# Patient Record
Sex: FEMALE | Race: WHITE | NOT HISPANIC OR LATINO
[De-identification: names, ages, dates, MRNs, and addresses within clinical notes are randomized per-mention and may not be internally consistent; named-entity substitution may affect disease eponyms.]

---

## 2017-04-12 ENCOUNTER — HOSPITAL (OUTPATIENT)
Dept: OTHER | Age: 27
End: 2017-04-12
Attending: EMERGENCY MEDICINE

## 2017-04-12 LAB
ALBUMIN SERPL-MCNC: 3.3 GM/DL (ref 3.6–5.1)
ALP SERPL-CCNC: 58 UNIT/L (ref 45–117)
ALT SERPL-CCNC: 33 UNIT/L
AMORPH SED URNS QL MICRO: ABNORMAL
ANALYZER ANC (IANC): ABNORMAL
ANION GAP SERPL CALC-SCNC: 15 MMOL/L (ref 10–20)
APPEARANCE UR: CLEAR
AST SERPL-CCNC: 26 UNIT/L
BACTERIA #/AREA URNS HPF: ABNORMAL /HPF
BASOPHILS # BLD: 0 THOUSAND/MCL (ref 0–0.3)
BASOPHILS NFR BLD: 0 %
BILIRUB CONJ SERPL-MCNC: 0.1 MG/DL (ref 0–0.2)
BILIRUB SERPL-MCNC: 0.4 MG/DL (ref 0.2–1)
BILIRUB UR QL: NEGATIVE
BUN SERPL-MCNC: 9 MG/DL (ref 6–20)
BUN/CREAT SERPL: 11 (ref 7–25)
CALCIUM SERPL-MCNC: 8.6 MG/DL (ref 8.4–10.2)
CAOX CRY URNS QL MICRO: ABNORMAL
CHLORIDE: 104 MMOL/L (ref 98–107)
CO2 SERPL-SCNC: 23 MMOL/L (ref 21–32)
COLOR UR: ABNORMAL
CREAT SERPL-MCNC: 0.8 MG/DL (ref 0.51–0.95)
DIFFERENTIAL METHOD BLD: ABNORMAL
EOSINOPHIL # BLD: 0 THOUSAND/MCL (ref 0.1–0.5)
EOSINOPHIL NFR BLD: 1 %
EPITH CASTS #/AREA URNS LPF: ABNORMAL /[LPF]
ERYTHROCYTE [DISTWIDTH] IN BLOOD: 12.2 % (ref 11–15)
FATTY CASTS #/AREA URNS LPF: ABNORMAL /[LPF]
GLUCOSE SERPL-MCNC: 88 MG/DL (ref 65–99)
GLUCOSE UR-MCNC: NEGATIVE MG/DL
GRAN CASTS #/AREA URNS LPF: ABNORMAL /[LPF]
HCG SERPL QL: NEGATIVE
HEMATOCRIT: 37.1 % (ref 36–46.5)
HGB BLD-MCNC: 12.6 GM/DL (ref 12–15.5)
HGB UR QL: NEGATIVE
HYALINE CASTS #/AREA URNS LPF: ABNORMAL /LPF (ref 0–5)
KETONES UR-MCNC: NEGATIVE MG/DL
LEUKOCYTE ESTERASE UR QL STRIP: NEGATIVE
LIPASE SERPL-CCNC: 85 UNIT/L (ref 73–393)
LYMPHOCYTES # BLD: 1.6 THOUSAND/MCL (ref 1–4.8)
LYMPHOCYTES NFR BLD: 19 %
MCH RBC QN AUTO: 29.2 PG (ref 26–34)
MCHC RBC AUTO-ENTMCNC: 34 GM/DL (ref 32–36.5)
MCV RBC AUTO: 85.9 FL (ref 78–100)
MIXED CELL CASTS #/AREA URNS LPF: ABNORMAL /[LPF]
MONOCYTES # BLD: 0.8 THOUSAND/MCL (ref 0.3–0.9)
MONOCYTES NFR BLD: 9 %
MUCOUS THREADS URNS QL MICRO: PRESENT
NEUTROPHILS # BLD: 6 THOUSAND/MCL (ref 1.8–7.7)
NEUTROPHILS NFR BLD: 71 %
NEUTS SEG NFR BLD: ABNORMAL %
NITRITE UR QL: NEGATIVE
PERCENT NRBC: ABNORMAL
PH UR: 5 UNIT (ref 5–7)
PLATELET # BLD: 212 THOUSAND/MCL (ref 140–450)
POTASSIUM SERPL-SCNC: 4 MMOL/L (ref 3.4–5.1)
PROT SERPL-MCNC: 7.2 GM/DL (ref 6.4–8.2)
PROT UR QL: NEGATIVE MG/DL
RBC # BLD: 4.32 MILLION/MCL (ref 4–5.2)
RBC #/AREA URNS HPF: ABNORMAL /HPF (ref 0–3)
RBC CASTS #/AREA URNS LPF: ABNORMAL /[LPF]
RENAL EPI CELLS #/AREA URNS HPF: ABNORMAL /[HPF]
SODIUM SERPL-SCNC: 138 MMOL/L (ref 135–145)
SP GR UR: 1.02 (ref 1–1.03)
SPECIMEN SOURCE: ABNORMAL
SPERM URNS QL MICRO: ABNORMAL
SQUAMOUS #/AREA URNS HPF: ABNORMAL /HPF (ref 0–5)
T VAGINALIS URNS QL MICRO: ABNORMAL
TRI-PHOS CRY URNS QL MICRO: ABNORMAL
URATE CRY URNS QL MICRO: ABNORMAL
URINE REFLEX: ABNORMAL
URNS CMNT MICRO: ABNORMAL
UROBILINOGEN UR QL: 0.2 MG/DL (ref 0–1)
WAXY CASTS #/AREA URNS LPF: ABNORMAL /[LPF]
WBC # BLD: 8.5 THOUSAND/MCL (ref 4.2–11)
WBC #/AREA URNS HPF: ABNORMAL /HPF (ref 0–5)
WBC CASTS #/AREA URNS LPF: ABNORMAL /[LPF]
YEAST HYPHAE URNS QL MICRO: ABNORMAL
YEAST URNS QL MICRO: ABNORMAL

## 2017-04-18 ENCOUNTER — HOSPITAL (OUTPATIENT)
Dept: OTHER | Age: 27
End: 2017-04-18
Attending: EMERGENCY MEDICINE

## 2017-04-18 LAB
ALBUMIN SERPL-MCNC: 3.2 GM/DL (ref 3.6–5.1)
ALBUMIN/GLOB SERPL: 0.8 {RATIO} (ref 1–2.4)
ALP SERPL-CCNC: 56 UNIT/L (ref 45–117)
ALT SERPL-CCNC: 24 UNIT/L
AMORPH SED URNS QL MICRO: ABNORMAL
ANALYZER ANC (IANC): ABNORMAL
ANION GAP SERPL CALC-SCNC: 13 MMOL/L (ref 10–20)
APPEARANCE UR: ABNORMAL
AST SERPL-CCNC: 20 UNIT/L
BACTERIA #/AREA URNS HPF: ABNORMAL /HPF
BASOPHILS # BLD: 0 THOUSAND/MCL (ref 0–0.3)
BASOPHILS NFR BLD: 0 %
BILIRUB SERPL-MCNC: 0.3 MG/DL (ref 0.2–1)
BILIRUB UR QL: NEGATIVE
BUN SERPL-MCNC: 9 MG/DL (ref 6–20)
BUN/CREAT SERPL: 13 (ref 7–25)
CALCIUM SERPL-MCNC: 8.6 MG/DL (ref 8.4–10.2)
CAOX CRY URNS QL MICRO: ABNORMAL
CHLORIDE: 105 MMOL/L (ref 98–107)
CO2 SERPL-SCNC: 25 MMOL/L (ref 21–32)
COLOR UR: ABNORMAL
CREAT SERPL-MCNC: 0.67 MG/DL (ref 0.51–0.95)
DIFFERENTIAL METHOD BLD: ABNORMAL
EOSINOPHIL # BLD: 0.1 THOUSAND/MCL (ref 0.1–0.5)
EOSINOPHIL NFR BLD: 1 %
EPITH CASTS #/AREA URNS LPF: ABNORMAL /[LPF]
ERYTHROCYTE [DISTWIDTH] IN BLOOD: 12 % (ref 11–15)
FATTY CASTS #/AREA URNS LPF: ABNORMAL /[LPF]
GLOBULIN SER-MCNC: 4 GM/DL (ref 2–4)
GLUCOSE SERPL-MCNC: 100 MG/DL (ref 65–99)
GLUCOSE UR-MCNC: NEGATIVE MG/DL
GRAN CASTS #/AREA URNS LPF: ABNORMAL /[LPF]
HEMATOCRIT: 36.9 % (ref 36–46.5)
HGB BLD-MCNC: 12.6 GM/DL (ref 12–15.5)
HGB UR QL: ABNORMAL
HYALINE CASTS #/AREA URNS LPF: ABNORMAL /LPF (ref 0–5)
KETONES UR-MCNC: NEGATIVE MG/DL
LEUKOCYTE ESTERASE UR QL STRIP: ABNORMAL
LYMPHOCYTES # BLD: 3.3 THOUSAND/MCL (ref 1–4.8)
LYMPHOCYTES NFR BLD: 19 %
MCH RBC QN AUTO: 29.6 PG (ref 26–34)
MCHC RBC AUTO-ENTMCNC: 34.1 GM/DL (ref 32–36.5)
MCV RBC AUTO: 86.6 FL (ref 78–100)
MIXED CELL CASTS #/AREA URNS LPF: ABNORMAL /[LPF]
MONOCYTES # BLD: 0.8 THOUSAND/MCL (ref 0.3–0.9)
MONOCYTES NFR BLD: 5 %
MUCOUS THREADS URNS QL MICRO: PRESENT
NEUTROPHILS # BLD: 12.9 THOUSAND/MCL (ref 1.8–7.7)
NEUTROPHILS NFR BLD: 75 %
NEUTS SEG NFR BLD: ABNORMAL %
NITRITE UR QL: NEGATIVE
PERCENT NRBC: ABNORMAL
PH UR: 6 UNIT (ref 5–7)
PLATELET # BLD: 316 THOUSAND/MCL (ref 140–450)
POTASSIUM SERPL-SCNC: 3.9 MMOL/L (ref 3.4–5.1)
PROT SERPL-MCNC: 7.2 GM/DL (ref 6.4–8.2)
PROT UR QL: 100 MG/DL
RBC # BLD: 4.26 MILLION/MCL (ref 4–5.2)
RBC #/AREA URNS HPF: >100 /HPF (ref 0–3)
RBC CASTS #/AREA URNS LPF: ABNORMAL /[LPF]
RENAL EPI CELLS #/AREA URNS HPF: ABNORMAL /[HPF]
SODIUM SERPL-SCNC: 139 MMOL/L (ref 135–145)
SP GR UR: 1.02 (ref 1–1.03)
SPECIMEN SOURCE: ABNORMAL
SPERM URNS QL MICRO: ABNORMAL
SQUAMOUS #/AREA URNS HPF: ABNORMAL /HPF (ref 0–5)
T VAGINALIS URNS QL MICRO: ABNORMAL
TRI-PHOS CRY URNS QL MICRO: ABNORMAL
URATE CRY URNS QL MICRO: ABNORMAL
URINE REFLEX: ABNORMAL
URNS CMNT MICRO: ABNORMAL
UROBILINOGEN UR QL: 0.2 MG/DL (ref 0–1)
WAXY CASTS #/AREA URNS LPF: ABNORMAL /[LPF]
WBC # BLD: 17.2 THOUSAND/MCL (ref 4.2–11)
WBC #/AREA URNS HPF: >100 /HPF (ref 0–5)
WBC CASTS #/AREA URNS LPF: ABNORMAL /[LPF]
YEAST HYPHAE URNS QL MICRO: ABNORMAL
YEAST URNS QL MICRO: ABNORMAL

## 2017-05-12 PROBLEM — R39.15 URGENCY OF URINATION: Status: ACTIVE | Noted: 2017-05-12

## 2017-05-12 PROBLEM — R82.90 ABNORMAL URINALYSIS: Status: ACTIVE | Noted: 2017-05-12

## 2017-05-12 PROBLEM — R10.2 PELVIC PAIN: Status: ACTIVE | Noted: 2017-05-12

## 2017-05-16 PROBLEM — N30.10 IC (INTERSTITIAL CYSTITIS): Status: ACTIVE | Noted: 2017-05-16

## 2017-06-07 NOTE — OPERATIVE REPORT
Willow Tomas  6/30/1990  [id number]  06/07/2017      Operative Report  Location:  [facility]      Preoperative diagnosis: Interstitial cystitis, Hunner's ulcer  Postoperative diagnosis: Same  Procedure: Cystoscopy with low pressure hydro-distention, f

## 2017-06-07 NOTE — H&P
Progress Notes    Jake Izaguirre (MR# [id number])         Progress Notes by Raissa Santos MD at 5/12/2017  8:39 AM      Author: Raissa Santos MD Service: (none) Author Type: Physician     Filed: 5/12/2017 12:23 PM Note Time: 5/12/2017  8:39 AM Status: Prescriptions: Tolterodine Tartrate ER 4 MG Oral Capsule SR 24 Hr  Take 1 capsule (4 mg total) by mouth daily.  Disp: 30 capsule  Rfl: 2    Methen-Hyosc-Meth Blue-Na Phos (UROGESIC-BLUE) 81.6 MG Oral Tab  Take 81.6 mg by mouth daily.  Claims: Simple Save R Andrew Fariba Date    •  Extrinsic asthma, unspecified          sports induced    •  Other and unspecified ovarian cysts      •  Irritable bowel      •  Hematuria          weird presentation of ibs    •  Asthma      •  GERD (gastroesophageal reflux bleeding  ENDOCRINE:  No sweating, no heat or cold intolerance    PSYCH: No nervousness,  depression,  stress         EXAM:    Ht 5' 7\" (1.702 m)  Wt 141 lb (63.957 kg)  BMI 22.08 kg/m2  LMP 04/27/2017  GENERAL: well developed, well nourished,in no appare continue  - pyridium PRN- not helpful  - trial of Urogesic Blue    - avoid ALL bladder irritants    - Return to clinic in 1-2 weeks for cystoscopy         The above referenced H&P was reviewed by Darline Bright MD on 06/07/2017, the patient was examined a

## 2017-06-07 NOTE — ANESTHESIA PREPROCEDURE EVALUATION
PRE-OP EVALUATION    Patient Name: Hector Grayson    Pre-op Diagnosis: INTERSTITIAL CYSTITIS    Procedure(s):  CYSTOSCOPY, FULGURATION OF HUNNER'S ULCER, KENALOG INJECTION    Surgeon(s) and Role:     * Niurka Marcial MD - Primary    Pre-op vitals reviewed into the lungs every 4 (four) hours as needed for Wheezing. Disp: 1 Inhaler Rfl: 5   Desogestrel-Ethinyl Estradiol (RECLIPSEN) 0.15-30 MG-MCG Oral Tab Take 1 tablet by mouth daily. Disp:  Rfl:        Allergies: Amitriptyline;  Augmentin      Anesthesia Eval Airway      Mallampati: I  Mouth opening: >3 FB  TM distance: 4 - 6 cm   Cardiovascular    Cardiovascular exam normal.         Dental    No notable dental history.          Pulmonary    Pulmonary exam normal.                 Other findings

## 2017-06-07 NOTE — ANESTHESIA POSTPROCEDURE EVALUATION
[facility] Patient Status:  Hospital Outpatient Surgery   Age/Gender 27year old female MRN [id number]   [facility] SURGERY Attending Nancy Corrigan MD   Hosp Day # 0 PCP Delma Acuna MD       Anesthesia Post-op Note

## 2017-08-15 ENCOUNTER — HOSPITAL (OUTPATIENT)
Dept: OTHER | Age: 27
End: 2017-08-15
Attending: EMERGENCY MEDICINE

## 2017-08-15 ENCOUNTER — DIAGNOSTIC TRANS (OUTPATIENT)
Dept: OTHER | Age: 27
End: 2017-08-15

## 2017-08-15 LAB
ANALYZER ANC (IANC): ABNORMAL
ANION GAP SERPL CALC-SCNC: 13 MMOL/L (ref 10–20)
BASOPHILS # BLD: 0 THOUSAND/MCL (ref 0–0.3)
BASOPHILS NFR BLD: 0 %
BUN SERPL-MCNC: 13 MG/DL (ref 6–20)
BUN/CREAT SERPL: 17 (ref 7–25)
CALCIUM SERPL-MCNC: 8.7 MG/DL (ref 8.4–10.2)
CHLORIDE: 106 MMOL/L (ref 98–107)
CO2 SERPL-SCNC: 25 MMOL/L (ref 21–32)
CREAT SERPL-MCNC: 0.78 MG/DL (ref 0.51–0.95)
DIFFERENTIAL METHOD BLD: ABNORMAL
EOSINOPHIL # BLD: 0.1 THOUSAND/MCL (ref 0.1–0.5)
EOSINOPHIL NFR BLD: 1 %
ERYTHROCYTE [DISTWIDTH] IN BLOOD: 12.6 % (ref 11–15)
GLUCOSE SERPL-MCNC: 90 MG/DL (ref 65–99)
HEMATOCRIT: 35.2 % (ref 36–46.5)
HGB BLD-MCNC: 11.6 GM/DL (ref 12–15.5)
LYMPHOCYTES # BLD: 1.6 THOUSAND/MCL (ref 1–4.8)
LYMPHOCYTES NFR BLD: 27 %
MCH RBC QN AUTO: 29.3 PG (ref 26–34)
MCHC RBC AUTO-ENTMCNC: 33 GM/DL (ref 32–36.5)
MCV RBC AUTO: 88.9 FL (ref 78–100)
MONOCYTES # BLD: 0.4 THOUSAND/MCL (ref 0.3–0.9)
MONOCYTES NFR BLD: 6 %
NEUTROPHILS # BLD: 4 THOUSAND/MCL (ref 1.8–7.7)
NEUTROPHILS NFR BLD: 66 %
NEUTS SEG NFR BLD: ABNORMAL %
PERCENT NRBC: ABNORMAL
PLATELET # BLD: 229 THOUSAND/MCL (ref 140–450)
POTASSIUM SERPL-SCNC: 3.8 MMOL/L (ref 3.4–5.1)
RBC # BLD: 3.96 MILLION/MCL (ref 4–5.2)
SODIUM SERPL-SCNC: 140 MMOL/L (ref 135–145)
TROPONIN I SERPL HS-MCNC: <0.02 NG/ML
WBC # BLD: 6.1 THOUSAND/MCL (ref 4.2–11)

## 2017-09-25 PROBLEM — R05.9 COUGH: Status: ACTIVE | Noted: 2017-09-25

## 2018-01-29 PROBLEM — M25.561 PAIN IN BOTH KNEES, UNSPECIFIED CHRONICITY: Status: ACTIVE | Noted: 2018-01-29

## 2018-01-29 PROBLEM — M25.571 RIGHT ANKLE PAIN, UNSPECIFIED CHRONICITY: Status: ACTIVE | Noted: 2018-01-29

## 2018-01-29 PROBLEM — M25.562 PAIN IN BOTH KNEES, UNSPECIFIED CHRONICITY: Status: ACTIVE | Noted: 2018-01-29

## 2018-03-15 PROBLEM — M54.9 BACK PAIN: Status: ACTIVE | Noted: 2018-03-15

## 2018-04-05 PROBLEM — M25.569 KNEE PAIN: Status: ACTIVE | Noted: 2018-04-05

## 2018-04-17 PROBLEM — M47.819 UNDIFFERENTIATED SPONDYLOARTHROPATHY: Status: ACTIVE | Noted: 2018-04-17

## 2018-04-17 NOTE — PROGRESS NOTES
SPINE EVALUATION:   Referring Physician: Dr. Charles ref. provider found  Diagnosis: low back pain     Date of Service: 4/17/2018     PATIENT Carlyle De is a 24year old y/o female who presents to therapy today with complaints of low back pain. hyperactivity of R low back and sometimes L low back extensors and rotatores. Patient has an extensive history of orthopedic/ issues ranging from knees, ankles to back.   Giulia Baca would benefit from skilled Physical Therapy to address the above impair Re-education; Therapeutic Activity;  Electrical Stim; Mechanical Traction; Pt education; Home exercise program instruction    Education or treatment limitation: None  Rehab Potential:good    Patient was advised of these findings, precautions, and treatment

## 2018-05-19 NOTE — H&P
H and P Update    The history obtained by Dr. Telma Hess, dated 5/7/18, has no changes.     GENERAL: well developed, well nourished, in no apparent distress  HEENT: atraumatic, normocephalic, ears and throat are clear  NECK: supple

## 2018-05-19 NOTE — OPERATIVE REPORT
PATIENT NAME: Johnny Davis  MRN: [id number]  DATE OF OPERATION: 5/21/2018  REFERRING PHYSICIAN: Dr. Henrique Patel  Medications:  MAC  DATE OF LAST COLONOSCOPY:  none  PREOPERATIVE DIAGNOSIS:  GERD  Abdominal pain  Nausea  Sacroiliitis  POSTOPERATIVE DIAGNOSIS:  1 valve, appendiceal orfice, hepatic and splenic flexures were all well visualized.   The bowel preparation on the Aronchick bowel prep score was 1. (1 - excellent > 95% mucosa seen; 2 - good - clear liquid up to 25% of the mucosa, 90% mucosa seen;  3 - fair

## 2018-05-21 NOTE — ANESTHESIA PREPROCEDURE EVALUATION
PRE-OP EVALUATION    Patient Name: Silvestre Hurley    Pre-op Diagnosis: Nausea [R11.0]  Sacroiliitis ([address] [M46.1]  Abdominal pain, periumbilical [M00.76]  Gastroesophageal reflux disease without esophagitis [K21.9]    Procedure(s):  ESOPHAGOGASTRODUODENOSCO ventricle: Estimation of the right ventricular systolic pressure is     within the normal range. 4. Tricuspid valve: There is mild regurgitation. 5. Pericardium, extracardiac: There is no significant pericardial effusion.   Impressions:  No previous study bilaterally. Other findings            ASA: 2   Plan: MAC  NPO status verified and patient meets guidelines. Patient has not taken beta blockers in last 24 hours. Post-procedure pain management plan discussed with surgeon and patient.

## 2018-05-21 NOTE — BRIEF OP NOTE
Pre-Operative Diagnosis: Nausea [R11.0]  Sacroiliitis (HCC) [M46.1]  Abdominal pain, periumbilical [O70.21]  Gastroesophageal reflux disease without esophagitis [K21.9]     Post-Operative Diagnosis: EGD- Normal  Colon- Normal      Procedure Performed:   Pr

## 2018-05-21 NOTE — ANESTHESIA POSTPROCEDURE EVALUATION
90 [facility] Patient Status:  Hospital Outpatient Surgery   Age/Gender 24year old female MRN [id number]   Location [address] [place] [address]. Attending Henna Coughlin MD   Hosp Day # 0 PCP Carlos A Tirado MD       Anesthesia Post-op Not

## 2018-05-23 NOTE — ED NOTES
Collected:  5/23/2018 08:32 Status:  Final result    Ref Range & Units 5/23/18 0832   ISTAT Sodium 136 - 144 mmol/L 139    ISTAT BUN 8 - 20 mg/dL 12    ISTAT Potassium 3.6 - 5.1 mmol/L 3.8    ISTAT Chloride 101 - 111 mmol/L 106    ISTAT Ionized Calcium 1.1

## 2018-05-23 NOTE — ED PROVIDER NOTES
Patient Seen in: [facility] Emergency Department    History   Patient presents with:  Chest Pain Angina (cardiovascular)    Stated Complaint: midsternal chest pain     HPI    The patient is a 47-year-old female presents with substernal chest pain th Smokeless tobacco: Never Used                      Comment: Job:   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[employer],            commutes, [address] at [employer]   Alcohol use: Yes           0.0 oz/week     Comment: 2 a day when healthy      Review esophagitis. Normal white count, not febrile or toxic. Case was discussed with Dr. Uri Oliva. Will proceed to CT of the chest to rule out/evaluate esophageal perforation which I feel is clinically unlikely. If negative, observation at home.   Return if wo

## 2018-05-23 NOTE — ED INITIAL ASSESSMENT (HPI)
3 days ago had a colonoscopy and endoscopy   Since then has had pressure to her chest  Hurts with deep breaths  SOB with exertion  Denies any symptoms before procedures    [facility] has 3 months of driving to [place] every other week- 6 hours drives

## 2018-05-23 NOTE — ED NOTES
Ben Davidson for transfer to [facility] ED with PIV to [facility] per Dr Osei Stain

## 2018-05-23 NOTE — ED PROVIDER NOTES
Patient Seen in: [facility] END    History   Patient presents with:  Chest Pain    Stated Complaint: 3 days chest pain (had colonoscopy on monday)    HPI  23 yo F with hx of GERD and now s/p scope (3 days ago) here with complaints o - resolved with                amitriptyline  5/16/17: OTHER SURGICAL HISTORY      Comment: Cystoscopy - Dr Mónica Becker  6/7/17: OTHER SURGICAL HISTORY      Comment: Cysto w low pressure hydro-distention,                fulguration of Hunner's ulcer & inj suri axes as noted on EKG Report.   Rate: 82 bpm  Rhythm: Sinus Rhythm  Reading: Incomplete right bundle branch block   Borderline EKG   QTc: 411 ms   No ST-T wave changes              Orders Placed This Encounter      XR CHEST PA + LAT CHEST (CPT=71046) Once Order Specific Question: What is the Relevant Clinical Indication / Reason for Exam?          Answer: 3 days chest pain (had colonoscopy on monday)      POCT CBC Once      D-Dimer,Triage Once      EKG 12 Lead O diagnosis)  Chest discomfort    Disposition:   Ic to ed  5/23/2018 10:41 am    Follow-up:    GO TO THE ER NOW            Medications Prescribed:  Discharge Medication List as of 5/23/2018 11:09 AM

## 2018-05-23 NOTE — ED INITIAL ASSESSMENT (HPI)
Pt here with midsternal continuous chest pain onset Monday after colonoscopy and EGD. +nausea. Denies sob pt here from [facility].

## 2018-05-23 NOTE — ED NOTES
Collected:  5/21/2018 06:55 Status:  Final result    5/21/18 0655   POCT urine pregnancy Negative    POCT LOT NUMBER [id number]    Procedure Control ok    EXPIRATION DATE 11/30/2019       Specimen Collected: 05/21/18 06:55

## 2018-05-24 NOTE — PROGRESS NOTES
Here are the biopsy/pathology findings from your recent EGD (uppper endoscopy) and colonoscopy: The biopsy/pathology findings from your upper endoscopy showed:   The biopsies of the duodenum, stomach and esophagus are all normal.    The  biopsy/pathology

## 2023-03-15 ENCOUNTER — LAB REQUISITION (OUTPATIENT)
Dept: OBGYN | Age: 33
End: 2023-03-15

## 2023-03-15 DIAGNOSIS — Z12.4 ENCOUNTER FOR SCREENING FOR MALIGNANT NEOPLASM OF CERVIX: ICD-10-CM

## 2023-03-15 PROCEDURE — 87624 HPV HI-RISK TYP POOLED RSLT: CPT | Performed by: CLINICAL MEDICAL LABORATORY

## 2023-03-15 PROCEDURE — 88175 CYTOPATH C/V AUTO FLUID REDO: CPT | Performed by: CLINICAL MEDICAL LABORATORY

## 2023-03-16 ENCOUNTER — CLINICAL ABSTRACT (OUTPATIENT)
Dept: MATERNAL FETAL MEDICINE | Age: 33
End: 2023-03-16

## 2023-03-16 DIAGNOSIS — Z31.69 ENCOUNTER FOR PRECONCEPTION CONSULTATION: Primary | ICD-10-CM

## 2023-03-18 LAB
CASE RPRT: NORMAL
CLINICAL INFO: NORMAL
CYTOLOGY CVX/VAG STUDY: NORMAL
HPV16+18+45 E6+E7MRNA CVX NAA+PROBE: NEGATIVE
Lab: NORMAL
PAP EDUCATIONAL NOTE: NORMAL
SPECIMEN ADEQUACY: NORMAL

## 2023-03-20 ENCOUNTER — TELEPHONE (OUTPATIENT)
Dept: MATERNAL FETAL MEDICINE | Age: 33
End: 2023-03-20

## 2023-04-03 ENCOUNTER — CLINICAL ABSTRACT (OUTPATIENT)
Dept: MATERNAL FETAL MEDICINE | Age: 33
End: 2023-04-03

## 2023-04-03 RX ORDER — DICLOFENAC POTASSIUM 25 MG/1
CAPSULE, LIQUID FILLED ORAL PRN
COMMUNITY

## 2023-04-03 SDOH — ECONOMIC STABILITY: HOUSING INSECURITY: WHAT IS YOUR LIVING SITUATION TODAY?: I HAVE HOUSING

## 2023-04-03 SDOH — HEALTH STABILITY: PHYSICAL HEALTH: ON AVERAGE, HOW MANY DAYS PER WEEK DO YOU ENGAGE IN MODERATE TO STRENUOUS EXERCISE (LIKE A BRISK WALK)?: 3 DAYS

## 2023-04-03 SDOH — HEALTH STABILITY: MENTAL HEALTH: HOW OFTEN DO YOU HAVE 6 OR MORE DRINKS ON ONE OCCASION?: NEVER

## 2023-04-03 SDOH — HEALTH STABILITY: PHYSICAL HEALTH: ON AVERAGE, HOW MANY MINUTES DO YOU ENGAGE IN EXERCISE AT THIS LEVEL?: 30 MIN

## 2023-04-03 SDOH — ECONOMIC STABILITY: HOUSING INSECURITY: WHAT IS YOUR LIVING SITUATION TODAY?: LIVING WITH OTHERS (TEMPORARY);HOUSE

## 2023-04-03 SDOH — ECONOMIC STABILITY: TRANSPORTATION INSECURITY
IN THE PAST 12 MONTHS, HAS LACK OF TRANSPORTATION KEPT YOU FROM MEETINGS, WORK, OR FROM GETTING THINGS NEEDED FOR DAILY LIVING?: NO

## 2023-04-03 SDOH — HEALTH STABILITY: MENTAL HEALTH
STRESS IS WHEN SOMEONE FEELS TENSE, NERVOUS, ANXIOUS, OR CAN'T SLEEP AT NIGHT BECAUSE THEIR MIND IS TROUBLED. HOW STRESSED ARE YOU?: A LITTLE BIT

## 2023-04-03 SDOH — HEALTH STABILITY: MENTAL HEALTH: HOW MANY STANDARD DRINKS CONTAINING ALCOHOL DO YOU HAVE ON A TYPICAL DAY?: 0,1 OR 2

## 2023-04-03 SDOH — SOCIAL STABILITY: SOCIAL NETWORK
HOW OFTEN DO YOU SEE OR TALK TO PEOPLE THAT YOU CARE ABOUT AND FEEL CLOSE TO? (FOR EXAMPLE: TALKING TO FRIENDS ON THE PHONE, VISITING FRIENDS OR FAMILY, GOING TO CHURCH OR CLUB MEETINGS): 5 OR MORE TIMES A WEEK

## 2023-04-03 SDOH — HEALTH STABILITY: MENTAL HEALTH: AUDIT TOTAL SCORE: 4

## 2023-04-03 SDOH — ECONOMIC STABILITY: FOOD INSECURITY: HOW OFTEN IN THE PAST 12 MONTHS WERE YOU WORRIED OR STRESSED ABOUT HAVING ENOUGH MONEY TO BUY NUTRITIOUS MEALS?: NEVER

## 2023-04-03 SDOH — ECONOMIC STABILITY: TRANSPORTATION INSECURITY
IN THE PAST 12 MONTHS, HAS THE LACK OF TRANSPORTATION KEPT YOU FROM MEDICAL APPOINTMENTS OR FROM GETTING MEDICATIONS?: NO

## 2023-04-03 SDOH — ECONOMIC STABILITY: GENERAL

## 2023-04-03 SDOH — HEALTH STABILITY: MENTAL HEALTH: HOW OFTEN DO YOU HAVE A DRINK CONTAINING ALCOHOL?: 4 OR MORE TIMES PER WEEK

## 2023-04-03 ASSESSMENT — PATIENT HEALTH QUESTIONNAIRE - PHQ9
CLINICAL INTERPRETATION OF PHQ2 SCORE: NO FURTHER SCREENING NEEDED
SUM OF ALL RESPONSES TO PHQ9 QUESTIONS 1 AND 2: 0
1. LITTLE INTEREST OR PLEASURE IN DOING THINGS: NOT AT ALL
2. FEELING DOWN, DEPRESSED OR HOPELESS: NOT AT ALL
SUM OF ALL RESPONSES TO PHQ9 QUESTIONS 1 AND 2: 0

## 2023-04-05 ENCOUNTER — OFFICE VISIT (OUTPATIENT)
Dept: MATERNAL FETAL MEDICINE | Age: 33
End: 2023-04-05
Attending: OBSTETRICS & GYNECOLOGY

## 2023-04-05 ENCOUNTER — APPOINTMENT (OUTPATIENT)
Dept: MATERNAL FETAL MEDICINE | Age: 33
End: 2023-04-05
Attending: OBSTETRICS & GYNECOLOGY

## 2023-04-05 VITALS
HEIGHT: 67 IN | SYSTOLIC BLOOD PRESSURE: 114 MMHG | BODY MASS INDEX: 24.33 KG/M2 | DIASTOLIC BLOOD PRESSURE: 64 MMHG | WEIGHT: 155 LBS

## 2023-04-05 DIAGNOSIS — M45.7 ANKYLOSING SPONDYLITIS OF LUMBOSACRAL REGION (CMD): ICD-10-CM

## 2023-04-05 DIAGNOSIS — G40.909 SEIZURE DISORDER (CMD): Primary | ICD-10-CM

## 2023-04-05 PROCEDURE — 99211 OFF/OP EST MAY X REQ PHY/QHP: CPT | Performed by: OBSTETRICS & GYNECOLOGY

## 2023-04-05 PROCEDURE — 99203 OFFICE O/P NEW LOW 30 MIN: CPT | Performed by: OBSTETRICS & GYNECOLOGY

## 2023-04-05 RX ORDER — VITAMIN B COMPLEX
300 TABLET ORAL DAILY
COMMUNITY

## 2023-04-10 PROBLEM — G40.909 SEIZURE DISORDER (CMD): Status: ACTIVE | Noted: 2023-04-10

## 2023-07-25 ENCOUNTER — HOSPITAL ENCOUNTER (EMERGENCY)
Age: 33
Discharge: HOME | End: 2023-07-25
Attending: EMERGENCY MEDICINE
Payer: COMMERCIAL

## 2023-07-25 ENCOUNTER — APPOINTMENT (OUTPATIENT)
Dept: ULTRASOUND IMAGING | Age: 33
End: 2023-07-25
Attending: NURSE PRACTITIONER
Payer: COMMERCIAL

## 2023-07-25 VITALS
DIASTOLIC BLOOD PRESSURE: 65 MMHG | OXYGEN SATURATION: 100 % | TEMPERATURE: 98 F | WEIGHT: 150 LBS | HEART RATE: 73 BPM | SYSTOLIC BLOOD PRESSURE: 107 MMHG | RESPIRATION RATE: 16 BRPM | BODY MASS INDEX: 23 KG/M2

## 2023-07-25 DIAGNOSIS — Z3A.01 LESS THAN 8 WEEKS GESTATION OF PREGNANCY: ICD-10-CM

## 2023-07-25 DIAGNOSIS — R10.31 ABDOMINAL PAIN, RLQ: Primary | ICD-10-CM

## 2023-07-25 LAB
ALBUMIN SERPL-MCNC: 3.5 G/DL (ref 3.4–5)
ALBUMIN/GLOB SERPL: 1 {RATIO} (ref 1–2)
ALP LIVER SERPL-CCNC: 54 U/L
ALT SERPL-CCNC: 30 U/L
ANION GAP SERPL CALC-SCNC: 3 MMOL/L (ref 0–18)
ANTIBODY SCREEN: NEGATIVE
AST SERPL-CCNC: 21 U/L (ref 15–37)
B-HCG SERPL-ACNC: 670 MIU/ML
BASOPHILS # BLD AUTO: 0.03 X10(3) UL (ref 0–0.2)
BASOPHILS NFR BLD AUTO: 0.5 %
BILIRUB SERPL-MCNC: 0.5 MG/DL (ref 0.1–2)
BILIRUB UR QL STRIP.AUTO: NEGATIVE
BUN BLD-MCNC: 9 MG/DL (ref 7–18)
CALCIUM BLD-MCNC: 8.5 MG/DL (ref 8.5–10.1)
CHLORIDE SERPL-SCNC: 109 MMOL/L (ref 98–112)
CLARITY UR REFRACT.AUTO: CLEAR
CO2 SERPL-SCNC: 22 MMOL/L (ref 21–32)
COLOR UR AUTO: YELLOW
CREAT BLD-MCNC: 0.72 MG/DL
EGFRCR SERPLBLD CKD-EPI 2021: 115 ML/MIN/1.73M2 (ref 60–?)
EOSINOPHIL # BLD AUTO: 0.06 X10(3) UL (ref 0–0.7)
EOSINOPHIL NFR BLD AUTO: 1 %
ERYTHROCYTE [DISTWIDTH] IN BLOOD BY AUTOMATED COUNT: 12.3 %
GLOBULIN PLAS-MCNC: 3.5 G/DL (ref 2.8–4.4)
GLUCOSE BLD-MCNC: 91 MG/DL (ref 70–99)
GLUCOSE UR STRIP.AUTO-MCNC: NEGATIVE MG/DL
HCT VFR BLD AUTO: 37.6 %
HGB BLD-MCNC: 12.8 G/DL
IMM GRANULOCYTES # BLD AUTO: 0.01 X10(3) UL (ref 0–1)
IMM GRANULOCYTES NFR BLD: 0.2 %
LEUKOCYTE ESTERASE UR QL STRIP.AUTO: NEGATIVE
LIPASE SERPL-CCNC: 25 U/L (ref 13–75)
LYMPHOCYTES # BLD AUTO: 1.69 X10(3) UL (ref 1–4)
LYMPHOCYTES NFR BLD AUTO: 27.7 %
MCH RBC QN AUTO: 29.8 PG (ref 26–34)
MCHC RBC AUTO-ENTMCNC: 34 G/DL (ref 31–37)
MCV RBC AUTO: 87.4 FL
MONOCYTES # BLD AUTO: 0.42 X10(3) UL (ref 0.1–1)
MONOCYTES NFR BLD AUTO: 6.9 %
NEUTROPHILS # BLD AUTO: 3.89 X10 (3) UL (ref 1.5–7.7)
NEUTROPHILS # BLD AUTO: 3.89 X10(3) UL (ref 1.5–7.7)
NEUTROPHILS NFR BLD AUTO: 63.7 %
NITRITE UR QL STRIP.AUTO: NEGATIVE
OSMOLALITY SERPL CALC.SUM OF ELEC: 276 MOSM/KG (ref 275–295)
PH UR STRIP.AUTO: 7 [PH] (ref 5–8)
PLATELET # BLD AUTO: 270 10(3)UL (ref 150–450)
POTASSIUM SERPL-SCNC: 3.5 MMOL/L (ref 3.5–5.1)
PROT SERPL-MCNC: 7 G/DL (ref 6.4–8.2)
PROT UR STRIP.AUTO-MCNC: NEGATIVE MG/DL
RBC # BLD AUTO: 4.3 X10(6)UL
RBC UR QL AUTO: NEGATIVE
RH BLOOD TYPE: POSITIVE
SODIUM SERPL-SCNC: 134 MMOL/L (ref 136–145)
SP GR UR STRIP.AUTO: 1.01 (ref 1–1.03)
UROBILINOGEN UR STRIP.AUTO-MCNC: 0.2 MG/DL
WBC # BLD AUTO: 6.1 X10(3) UL (ref 4–11)

## 2023-07-25 PROCEDURE — 86901 BLOOD TYPING SEROLOGIC RH(D): CPT | Performed by: NURSE PRACTITIONER

## 2023-07-25 PROCEDURE — 86900 BLOOD TYPING SEROLOGIC ABO: CPT | Performed by: NURSE PRACTITIONER

## 2023-07-25 PROCEDURE — 99284 EMERGENCY DEPT VISIT MOD MDM: CPT

## 2023-07-25 PROCEDURE — 81003 URINALYSIS AUTO W/O SCOPE: CPT | Performed by: EMERGENCY MEDICINE

## 2023-07-25 PROCEDURE — 85025 COMPLETE CBC W/AUTO DIFF WBC: CPT | Performed by: NURSE PRACTITIONER

## 2023-07-25 PROCEDURE — 80053 COMPREHEN METABOLIC PANEL: CPT | Performed by: NURSE PRACTITIONER

## 2023-07-25 PROCEDURE — 36415 COLL VENOUS BLD VENIPUNCTURE: CPT

## 2023-07-25 PROCEDURE — 84702 CHORIONIC GONADOTROPIN TEST: CPT | Performed by: NURSE PRACTITIONER

## 2023-07-25 PROCEDURE — 76817 TRANSVAGINAL US OBSTETRIC: CPT | Performed by: NURSE PRACTITIONER

## 2023-07-25 PROCEDURE — 86850 RBC ANTIBODY SCREEN: CPT | Performed by: NURSE PRACTITIONER

## 2023-07-25 PROCEDURE — 83690 ASSAY OF LIPASE: CPT | Performed by: NURSE PRACTITIONER

## 2023-07-25 PROCEDURE — 76801 OB US < 14 WKS SINGLE FETUS: CPT | Performed by: NURSE PRACTITIONER

## 2023-07-25 NOTE — ED PROVIDER NOTES
I reviewed that chart and discussed the case. I have examined the patient and noted right lower quadrant abdominal pain for the past few days. Patient is approximately 5 weeks pregnant. Abdomen is soft and nontender on my exam.  No clinical signs of appendicitis. Labs unremarkable. White blood cell count is normal.  Quant beta 670. Possible early IUP on ultrasound, no findings at this time of ectopic. Patient will repeat quant beta in 2 days through her PCP at [facility]. Informed that if right lower quadrant abdominal pain increases or fever develops, return for reevaluation. .      I agree with the following clinical impression(s): Early pregnancy abdominal pain    I agree with the plan as noted.

## 2023-07-25 NOTE — ED INITIAL ASSESSMENT (HPI)
Pt reports she is approx 5 weeks pregnant and since yest having RLQ pain,today the pain is constant, denies vaginal bleeding

## 2023-07-27 ENCOUNTER — LAB SERVICES (OUTPATIENT)
Dept: LAB | Age: 33
End: 2023-07-27

## 2023-07-27 DIAGNOSIS — O20.0 THREATENED ABORTION: ICD-10-CM

## 2023-07-27 LAB
HCG SERPL-ACNC: 2029 MUNITS/ML
PROGEST SERPL-MCNC: 19.47 NG/ML

## 2023-07-27 PROCEDURE — 36415 COLL VENOUS BLD VENIPUNCTURE: CPT | Performed by: CLINICAL MEDICAL LABORATORY

## 2023-07-27 PROCEDURE — 84144 ASSAY OF PROGESTERONE: CPT | Performed by: CLINICAL MEDICAL LABORATORY

## 2023-07-27 PROCEDURE — 84702 CHORIONIC GONADOTROPIN TEST: CPT | Performed by: CLINICAL MEDICAL LABORATORY

## 2023-08-15 ENCOUNTER — LAB REQUISITION (OUTPATIENT)
Dept: OBGYN | Age: 33
End: 2023-08-15

## 2023-08-15 DIAGNOSIS — Z11.3 SCREEN FOR SEXUALLY TRANSMITTED DISEASES: ICD-10-CM

## 2023-08-15 DIAGNOSIS — Z34.81 PRENATAL CARE, SUBSEQUENT PREGNANCY, FIRST TRIMESTER: ICD-10-CM

## 2023-08-15 PROCEDURE — 87086 URINE CULTURE/COLONY COUNT: CPT | Performed by: CLINICAL MEDICAL LABORATORY

## 2023-08-15 PROCEDURE — 87491 CHLMYD TRACH DNA AMP PROBE: CPT | Performed by: CLINICAL MEDICAL LABORATORY

## 2023-08-15 PROCEDURE — 87591 N.GONORRHOEAE DNA AMP PROB: CPT | Performed by: CLINICAL MEDICAL LABORATORY

## 2023-08-16 LAB
C TRACH RRNA CVX QL NAA+PROBE: NEGATIVE
Lab: NORMAL
N GONORRHOEA RRNA CVX QL NAA+PROBE: NEGATIVE

## 2023-08-17 LAB — BACTERIA UR CULT: NO GROWTH

## 2023-08-18 ENCOUNTER — TELEPHONE (OUTPATIENT)
Dept: OBGYN CLINIC | Facility: CLINIC | Age: 33
End: 2023-08-18

## 2023-08-18 NOTE — TELEPHONE ENCOUNTER
Pt called stating she had a second US done 08/15/23 at [facility] and will have it fx over. Pt also wanted to mention she has a seizure disorder and takes medication (keppra).

## 2023-08-18 NOTE — TELEPHONE ENCOUNTER
Patient calling to transfer OB care  GA 8wk1d  GUANAKO 03/28/24  Insurance [employer] PPO   Good time to return phone call anytime

## 2023-08-18 NOTE — TELEPHONE ENCOUNTER
Patient had US done at the ED on 7/25/23 but was only 5 weeks gestation. Patient has not been established with another group for prenatal care so this is technically not a transfer. Patient will need to have another US with her appointment to confirm IUP and dating. Routed to [facility]- please assist with adding US.

## 2023-08-31 ENCOUNTER — LAB SERVICES (OUTPATIENT)
Dept: LAB | Age: 33
End: 2023-08-31

## 2023-08-31 DIAGNOSIS — Z31.5 ENCOUNTER FOR PROCREATIVE GENETIC COUNSELING: ICD-10-CM

## 2023-08-31 DIAGNOSIS — Z34.81 ENCOUNTER FOR SUPERVISION OF OTHER NORMAL PREGNANCY, FIRST TRIMESTER: Primary | ICD-10-CM

## 2023-08-31 DIAGNOSIS — Z34.81 PRENATAL CARE, SUBSEQUENT PREGNANCY, FIRST TRIMESTER: ICD-10-CM

## 2023-08-31 LAB
ABO + RH BLD: NORMAL
BASOPHILS # BLD: 0 K/MCL (ref 0–0.3)
BASOPHILS NFR BLD: 0 %
BKR KIT TESTING DISCLAIMER STATEMENT: NORMAL
BLD GP AB SCN SERPL QL GEL: NEGATIVE
DEPRECATED RDW RBC: 38.3 FL (ref 39–50)
EOSINOPHIL # BLD: 0.1 K/MCL (ref 0–0.5)
EOSINOPHIL NFR BLD: 1 %
ERYTHROCYTE [DISTWIDTH] IN BLOOD: 11.9 % (ref 11–15)
HBV SURFACE AG SER QL: NEGATIVE
HCT VFR BLD CALC: 36.1 % (ref 36–46.5)
HCV AB SER QL: NEGATIVE
HEPATITIS B SURFACE ANTIGEN OB: NEGATIVE
HGB BLD-MCNC: 12.1 G/DL (ref 12–15.5)
HIV 1+2 AB+HIV1 P24 AG SERPL QL IA: NONREACTIVE
HIV ANTIGEN-ANTIBODY QUAL: NONREACTIVE
IMM GRANULOCYTES # BLD AUTO: 0 K/MCL (ref 0–0.2)
IMM GRANULOCYTES # BLD: 0 %
LYMPHOCYTES # BLD: 2 K/MCL (ref 1–4.8)
LYMPHOCYTES NFR BLD: 23 %
MCH RBC QN AUTO: 29.4 PG (ref 26–34)
MCHC RBC AUTO-ENTMCNC: 33.5 G/DL (ref 32–36.5)
MCV RBC AUTO: 87.8 FL (ref 78–100)
MONOCYTES # BLD: 0.5 K/MCL (ref 0.3–0.9)
MONOCYTES NFR BLD: 6 %
NEUTROPHILS # BLD: 6.1 K/MCL (ref 1.8–7.7)
NEUTROPHILS NFR BLD: 70 %
NRBC BLD MANUAL-RTO: 0 /100 WBC
PLATELET # BLD AUTO: 268 K/MCL (ref 140–450)
RAPID PLASMA REAGIN OB: NONREACTIVE
RBC # BLD: 4.11 MIL/MCL (ref 4–5.2)
RUBV IGG SERPL IA-ACNC: 49.5 UNITS/ML
WBC # BLD: 8.7 K/MCL (ref 4.2–11)

## 2023-08-31 PROCEDURE — 80081 OBSTETRIC PANEL INC HIV TSTG: CPT | Performed by: CLINICAL MEDICAL LABORATORY

## 2023-08-31 PROCEDURE — 86803 HEPATITIS C AB TEST: CPT | Performed by: CLINICAL MEDICAL LABORATORY

## 2023-08-31 PROCEDURE — 36415 COLL VENOUS BLD VENIPUNCTURE: CPT | Performed by: CLINICAL MEDICAL LABORATORY

## 2023-08-31 PROCEDURE — 86787 VARICELLA-ZOSTER ANTIBODY: CPT | Performed by: CLINICAL MEDICAL LABORATORY

## 2023-09-01 LAB — RPR SER QL: NONREACTIVE

## 2023-09-02 LAB
VZV IGG SER IA-ACNC: 142.1 INDEX
VZV IGG SER QL IA: ABNORMAL

## 2023-09-07 NOTE — TELEPHONE ENCOUNTER
Received records via fax from [phone] in [place] [phone].     Given to MA's to abstract and place in Dr Charlie watts in [facility]    Future Appointments   Date Time Provider Terry Drake   9/19/2023 12:30 PM Johanna Hilliard MD [facility] P [facility] [address]

## 2023-09-13 ENCOUNTER — INITIAL PRENATAL (OUTPATIENT)
Dept: OBGYN CLINIC | Facility: CLINIC | Age: 33
End: 2023-09-13
Payer: COMMERCIAL

## 2023-09-13 VITALS
BODY MASS INDEX: 23.86 KG/M2 | WEIGHT: 152 LBS | SYSTOLIC BLOOD PRESSURE: 98 MMHG | DIASTOLIC BLOOD PRESSURE: 62 MMHG | HEIGHT: 67 IN | HEART RATE: 84 BPM

## 2023-09-13 DIAGNOSIS — G40.909 SEIZURE DISORDER (HCC): Primary | ICD-10-CM

## 2023-09-13 PROBLEM — O09.899 MATERNAL VARICELLA, NON-IMMUNE (HCC): Status: ACTIVE | Noted: 2023-09-13

## 2023-09-13 PROBLEM — Z28.39 MATERNAL VARICELLA, NON-IMMUNE: Status: ACTIVE | Noted: 2023-09-13

## 2023-09-13 PROBLEM — Z34.00 SUPERVISION OF NORMAL FIRST PREGNANCY: Status: ACTIVE | Noted: 2023-09-13

## 2023-09-13 PROBLEM — M25.562 PAIN IN BOTH KNEES, UNSPECIFIED CHRONICITY: Status: RESOLVED | Noted: 2018-01-29 | Resolved: 2023-09-13

## 2023-09-13 PROBLEM — M25.569 KNEE PAIN: Status: RESOLVED | Noted: 2018-04-05 | Resolved: 2023-09-13

## 2023-09-13 PROBLEM — O09.899 MATERNAL VARICELLA, NON-IMMUNE: Status: ACTIVE | Noted: 2023-09-13

## 2023-09-13 PROBLEM — M25.571 RIGHT ANKLE PAIN, UNSPECIFIED CHRONICITY: Status: RESOLVED | Noted: 2018-01-29 | Resolved: 2023-09-13

## 2023-09-13 PROBLEM — R82.90 ABNORMAL URINALYSIS: Status: RESOLVED | Noted: 2017-05-12 | Resolved: 2023-09-13

## 2023-09-13 PROBLEM — R05.9 COUGH: Status: RESOLVED | Noted: 2017-09-25 | Resolved: 2023-09-13

## 2023-09-13 PROBLEM — M25.561 PAIN IN BOTH KNEES, UNSPECIFIED CHRONICITY: Status: RESOLVED | Noted: 2018-01-29 | Resolved: 2023-09-13

## 2023-09-13 PROBLEM — Z28.39 MATERNAL VARICELLA, NON-IMMUNE (HCC): Status: ACTIVE | Noted: 2023-09-13

## 2023-09-13 PROBLEM — M54.9 BACK PAIN: Status: RESOLVED | Noted: 2018-03-15 | Resolved: 2023-09-13

## 2023-09-13 PROBLEM — Z34.00 SUPERVISION OF NORMAL FIRST PREGNANCY (HCC): Status: ACTIVE | Noted: 2023-09-13

## 2023-09-13 PROBLEM — R39.15 URGENCY OF URINATION: Status: RESOLVED | Noted: 2017-05-12 | Resolved: 2023-09-13

## 2023-09-13 PROBLEM — M47.819 UNDIFFERENTIATED SPONDYLOARTHROPATHY: Status: RESOLVED | Noted: 2018-04-17 | Resolved: 2023-09-13

## 2023-09-13 PROBLEM — R10.2 PELVIC PAIN: Status: RESOLVED | Noted: 2017-05-12 | Resolved: 2023-09-13

## 2023-09-13 PROCEDURE — 3074F SYST BP LT 130 MM HG: CPT | Performed by: STUDENT IN AN ORGANIZED HEALTH CARE EDUCATION/TRAINING PROGRAM

## 2023-09-13 PROCEDURE — 3078F DIAST BP <80 MM HG: CPT | Performed by: STUDENT IN AN ORGANIZED HEALTH CARE EDUCATION/TRAINING PROGRAM

## 2023-09-13 PROCEDURE — 3008F BODY MASS INDEX DOCD: CPT | Performed by: STUDENT IN AN ORGANIZED HEALTH CARE EDUCATION/TRAINING PROGRAM

## 2023-09-13 RX ORDER — FOLIC ACID 1 MG/1
4 TABLET ORAL DAILY
COMMUNITY

## 2023-09-14 ENCOUNTER — TELEPHONE (OUTPATIENT)
Dept: OBGYN CLINIC | Facility: CLINIC | Age: 33
End: 2023-09-14

## 2023-09-14 PROBLEM — Z14.8 CARRIER OF MUSCULAR DYSTROPHY: Status: ACTIVE | Noted: 2023-09-14

## 2023-09-14 NOTE — TELEPHONE ENCOUNTER
Received carrier screening report from [address] [place] [address] in [place]. Placed in [facility] in [place] for review.  Thank you

## 2023-09-14 NOTE — TELEPHONE ENCOUNTER
Results reviewed, she was notified by her previous practice who ordered testing.  Will discuss with patient at next visit, consider referral to [facility]

## 2023-09-26 ENCOUNTER — TELEPHONE (OUTPATIENT)
Dept: OBGYN CLINIC | Facility: CLINIC | Age: 33
End: 2023-09-26

## 2023-09-26 NOTE — TELEPHONE ENCOUNTER
Patient reports intense abdominal cramping rated at an 8 on a 1-10 scale at 12pm yesterday and [facility] and it woke her up from sleep. Intense cramping episodes lasted about 15 minutes each. Minor cramping has continued today- now rated at a 3 on a 1-10 scale. Patient has not tried Tylenol or any OTC medication for GI discomfort. Patient has increased her water intake and reports that a heating pad does give her some relief. Denies bleeding. Discussed that constipation can sometimes cause intense abdominal pain and iron in prenatal vitamin can lead to constipation. Patient currently denies constipation. Takes prenatal vitamin at night. GUANAKO 3/28/2024, 13w5d,   Pregnancy location previously confirmed as intrauterine. Advised patient to try Tylenol or OTC treatment for GI upset from list of safe medications we provided to her. Instructed patient to call back if cramping persists or worsens or if bleeding develops. Instructed patient to go to the ER with severe pain or period flow bleeding. Patient verbalized understanding.

## 2023-09-26 NOTE — TELEPHONE ENCOUNTER
Pt called regarding stomach pain that has been consistent since yesterday and wants to speak with a nurse.  Please advise

## 2023-10-10 ENCOUNTER — TELEPHONE (OUTPATIENT)
Dept: OBGYN CLINIC | Facility: CLINIC | Age: 33
End: 2023-10-10

## 2023-10-11 ENCOUNTER — ROUTINE PRENATAL (OUTPATIENT)
Dept: OBGYN CLINIC | Facility: CLINIC | Age: 33
End: 2023-10-11
Payer: COMMERCIAL

## 2023-10-11 VITALS
DIASTOLIC BLOOD PRESSURE: 70 MMHG | BODY MASS INDEX: 24.48 KG/M2 | WEIGHT: 156 LBS | HEART RATE: 84 BPM | HEIGHT: 67 IN | SYSTOLIC BLOOD PRESSURE: 110 MMHG

## 2023-10-11 DIAGNOSIS — Z36.8A ENCOUNTER FOR ANTENATAL SCREENING FOR OTHER GENETIC DEFECT: Primary | ICD-10-CM

## 2023-10-11 PROCEDURE — 3078F DIAST BP <80 MM HG: CPT | Performed by: NURSE PRACTITIONER

## 2023-10-11 PROCEDURE — 3074F SYST BP LT 130 MM HG: CPT | Performed by: NURSE PRACTITIONER

## 2023-10-11 PROCEDURE — 3008F BODY MASS INDEX DOCD: CPT | Performed by: NURSE PRACTITIONER

## 2023-10-11 NOTE — PROGRESS NOTES
NAT  FM- none yet  Doing well  No complaints.  No LOF/VB/uctx  Genetic testing- MS AFP ordered, normal NIPT  Spouse not a carrier of muscular dystrophy as patient is but carrier of Aaron Randall which patient is not  Anatomy Scan with MFM  NAT 4 weeks

## 2023-10-13 ENCOUNTER — LAB ENCOUNTER (OUTPATIENT)
Dept: LAB | Age: 33
End: 2023-10-13
Attending: NURSE PRACTITIONER
Payer: COMMERCIAL

## 2023-10-13 DIAGNOSIS — Z36.8A ENCOUNTER FOR ANTENATAL SCREENING FOR OTHER GENETIC DEFECT: ICD-10-CM

## 2023-10-13 PROCEDURE — 36415 COLL VENOUS BLD VENIPUNCTURE: CPT

## 2023-10-13 PROCEDURE — 82105 ALPHA-FETOPROTEIN SERUM: CPT

## 2023-10-16 LAB
AFP MOM: 0.9
AFP VALUE: 30.1 NG/ML
GA ON COLL DATE: 16.1 WEEKS
INSULIN DEP AFP: NO
MAT AGE AT EDD: 31.7 YR
MULTIPLE GEST AFP: NO
OSBR RISK 1 IN AFP: NORMAL
WEIGHT AFP: 156 LBS

## 2023-10-20 ENCOUNTER — TELEPHONE (OUTPATIENT)
Dept: OBGYN CLINIC | Facility: CLINIC | Age: 33
End: 2023-10-20

## 2023-10-20 NOTE — TELEPHONE ENCOUNTER
Patient was prescribed an antibiotic for bronchitis and wanted to  let us know and advise it is ok while pregnant?

## 2023-10-20 NOTE — TELEPHONE ENCOUNTER
Patient calling with c/o sinus infection and bronchitis. She saw a provider and was prescribed Amoxicillin. Discussed okay to take. Precautions provided. MyChart message addressed. No further questions.

## 2023-10-25 ENCOUNTER — MED REC SCAN ONLY (OUTPATIENT)
Dept: OBGYN CLINIC | Facility: CLINIC | Age: 33
End: 2023-10-25

## 2023-10-26 ENCOUNTER — TELEPHONE (OUTPATIENT)
Dept: OBGYN CLINIC | Facility: CLINIC | Age: 33
End: 2023-10-26

## 2023-10-26 NOTE — TELEPHONE ENCOUNTER
Hx:  GA: 18w0d    Medication list already sent via [address] Patient was recently on antibiotics for bronchitis and that gave her a yeast infection - is using Monistat 7 day one. Was seen in UC today and tested positive for Covid. Discussion held about antiviral use - at this time she will hold off on taking them. Precautions discussed. No further questions.

## 2023-11-04 ENCOUNTER — PATIENT MESSAGE (OUTPATIENT)
Dept: OBGYN CLINIC | Facility: CLINIC | Age: 33
End: 2023-11-04

## 2023-11-07 NOTE — TELEPHONE ENCOUNTER
From: Katerina Torres  To: Latisha Rosen  Sent: 11/4/2023 11:45 AM CDT  Subject: Message you sent    Hello,     I just saw your message about over the counter medications safe in pregnancy, however, that's not why I called. I called because in the last three weeks I have had a respiratory virus, then bronchitis and a sinus infection, then COVID, and now bronchitis again. The urgent care doctor I saw today prescribed me Prednisone, but the pharmacist seemed iffy about me taking it and told me to contact my OB. So my question is whether I should take this Prednisone.

## 2023-11-09 ENCOUNTER — ULTRASOUND ENCOUNTER (OUTPATIENT)
Dept: PERINATAL CARE | Facility: HOSPITAL | Age: 33
End: 2023-11-09
Attending: OBSTETRICS & GYNECOLOGY
Payer: COMMERCIAL

## 2023-11-09 ENCOUNTER — OFFICE VISIT (OUTPATIENT)
Dept: PERINATAL CARE | Facility: HOSPITAL | Age: 33
End: 2023-11-09
Attending: STUDENT IN AN ORGANIZED HEALTH CARE EDUCATION/TRAINING PROGRAM
Payer: COMMERCIAL

## 2023-11-09 VITALS
WEIGHT: 162 LBS | DIASTOLIC BLOOD PRESSURE: 66 MMHG | SYSTOLIC BLOOD PRESSURE: 105 MMHG | HEART RATE: 97 BPM | BODY MASS INDEX: 25.43 KG/M2 | HEIGHT: 67 IN

## 2023-11-09 DIAGNOSIS — G40.909 EPILEPSY AFFECTING PREGNANCY, ANTEPARTUM (HCC): Primary | ICD-10-CM

## 2023-11-09 DIAGNOSIS — O99.350 EPILEPSY AFFECTING PREGNANCY, ANTEPARTUM (HCC): Primary | ICD-10-CM

## 2023-11-09 DIAGNOSIS — O99.350 EPILEPSY AFFECTING PREGNANCY, ANTEPARTUM (HCC): ICD-10-CM

## 2023-11-09 DIAGNOSIS — G40.909 EPILEPSY AFFECTING PREGNANCY, ANTEPARTUM (HCC): ICD-10-CM

## 2023-11-09 PROCEDURE — 76811 OB US DETAILED SNGL FETUS: CPT | Performed by: OBSTETRICS & GYNECOLOGY

## 2023-11-14 ENCOUNTER — ROUTINE PRENATAL (OUTPATIENT)
Dept: OBGYN CLINIC | Facility: CLINIC | Age: 33
End: 2023-11-14
Payer: COMMERCIAL

## 2023-11-14 VITALS
HEART RATE: 84 BPM | DIASTOLIC BLOOD PRESSURE: 56 MMHG | BODY MASS INDEX: 25.27 KG/M2 | HEIGHT: 67 IN | WEIGHT: 161 LBS | SYSTOLIC BLOOD PRESSURE: 92 MMHG

## 2023-11-14 DIAGNOSIS — Z3A.20 20 WEEKS GESTATION OF PREGNANCY: Primary | ICD-10-CM

## 2023-11-14 DIAGNOSIS — Z36.9 ANTENATAL SCREENING ENCOUNTER: ICD-10-CM

## 2023-11-14 PROCEDURE — 3074F SYST BP LT 130 MM HG: CPT | Performed by: NURSE PRACTITIONER

## 2023-11-14 PROCEDURE — 3078F DIAST BP <80 MM HG: CPT | Performed by: NURSE PRACTITIONER

## 2023-11-14 PROCEDURE — 3008F BODY MASS INDEX DOCD: CPT | Performed by: NURSE PRACTITIONER

## 2023-11-14 NOTE — PROGRESS NOTES
NAT--20w5d    Doing well. Denies Vb, LOF, contractions. + fetal movement.        A/P:   FHT-P  PNL: CBC and 1 hour GTT discussed and ordered placed (24-28 weeks)  RH positive  Seizure disorder: Level II with MFM completed, growth US at 32 weeks  Reviewed  labor precautions      Return in 4 weeks

## 2023-12-07 ENCOUNTER — ROUTINE PRENATAL (OUTPATIENT)
Dept: OBGYN CLINIC | Facility: CLINIC | Age: 33
End: 2023-12-07
Payer: COMMERCIAL

## 2023-12-07 VITALS
SYSTOLIC BLOOD PRESSURE: 104 MMHG | DIASTOLIC BLOOD PRESSURE: 54 MMHG | BODY MASS INDEX: 26 KG/M2 | HEART RATE: 81 BPM | WEIGHT: 167 LBS

## 2023-12-07 DIAGNOSIS — Z34.00 SUPERVISION OF NORMAL FIRST PREGNANCY, ANTEPARTUM: Primary | ICD-10-CM

## 2023-12-07 PROCEDURE — 3074F SYST BP LT 130 MM HG: CPT | Performed by: NURSE PRACTITIONER

## 2023-12-07 PROCEDURE — 3078F DIAST BP <80 MM HG: CPT | Performed by: NURSE PRACTITIONER

## 2023-12-07 RX ORDER — LEVETIRACETAM 1000 MG/1
TABLET ORAL
COMMUNITY
Start: 2023-12-06

## 2023-12-13 ENCOUNTER — OFFICE VISIT (OUTPATIENT)
Dept: FAMILY MEDICINE CLINIC | Facility: CLINIC | Age: 33
End: 2023-12-13
Payer: COMMERCIAL

## 2023-12-13 VITALS
DIASTOLIC BLOOD PRESSURE: 60 MMHG | WEIGHT: 173.38 LBS | SYSTOLIC BLOOD PRESSURE: 100 MMHG | RESPIRATION RATE: 16 BRPM | BODY MASS INDEX: 27.21 KG/M2 | TEMPERATURE: 97 F | OXYGEN SATURATION: 98 % | HEART RATE: 84 BPM | HEIGHT: 67 IN

## 2023-12-13 DIAGNOSIS — N30.10 IC (INTERSTITIAL CYSTITIS): ICD-10-CM

## 2023-12-13 DIAGNOSIS — G40.909 SEIZURE DISORDER (HCC): Primary | ICD-10-CM

## 2023-12-13 DIAGNOSIS — M45.9 ANKYLOSING SPONDYLITIS, UNSPECIFIED SITE OF SPINE (HCC): ICD-10-CM

## 2023-12-13 PROCEDURE — 99203 OFFICE O/P NEW LOW 30 MIN: CPT | Performed by: STUDENT IN AN ORGANIZED HEALTH CARE EDUCATION/TRAINING PROGRAM

## 2023-12-13 PROCEDURE — 3074F SYST BP LT 130 MM HG: CPT | Performed by: STUDENT IN AN ORGANIZED HEALTH CARE EDUCATION/TRAINING PROGRAM

## 2023-12-13 PROCEDURE — 3078F DIAST BP <80 MM HG: CPT | Performed by: STUDENT IN AN ORGANIZED HEALTH CARE EDUCATION/TRAINING PROGRAM

## 2023-12-13 PROCEDURE — 3008F BODY MASS INDEX DOCD: CPT | Performed by: STUDENT IN AN ORGANIZED HEALTH CARE EDUCATION/TRAINING PROGRAM

## 2023-12-16 ENCOUNTER — LAB ENCOUNTER (OUTPATIENT)
Dept: LAB | Facility: HOSPITAL | Age: 33
End: 2023-12-16
Attending: NURSE PRACTITIONER
Payer: COMMERCIAL

## 2023-12-16 DIAGNOSIS — Z36.9 ANTENATAL SCREENING ENCOUNTER: ICD-10-CM

## 2023-12-16 LAB
BASOPHILS # BLD AUTO: 0.03 X10(3) UL (ref 0–0.2)
BASOPHILS NFR BLD AUTO: 0.3 %
EOSINOPHIL # BLD AUTO: 0.07 X10(3) UL (ref 0–0.7)
EOSINOPHIL NFR BLD AUTO: 0.6 %
ERYTHROCYTE [DISTWIDTH] IN BLOOD BY AUTOMATED COUNT: 13.2 %
GLUCOSE 1H P GLC SERPL-MCNC: 157 MG/DL
HCT VFR BLD AUTO: 32.1 %
HGB BLD-MCNC: 10.6 G/DL
IMM GRANULOCYTES # BLD AUTO: 0.13 X10(3) UL (ref 0–1)
IMM GRANULOCYTES NFR BLD: 1.2 %
LYMPHOCYTES # BLD AUTO: 1.33 X10(3) UL (ref 1–4)
LYMPHOCYTES NFR BLD AUTO: 12.2 %
MCH RBC QN AUTO: 30.2 PG (ref 26–34)
MCHC RBC AUTO-ENTMCNC: 33 G/DL (ref 31–37)
MCV RBC AUTO: 91.5 FL
MONOCYTES # BLD AUTO: 0.48 X10(3) UL (ref 0.1–1)
MONOCYTES NFR BLD AUTO: 4.4 %
NEUTROPHILS # BLD AUTO: 8.86 X10 (3) UL (ref 1.5–7.7)
NEUTROPHILS # BLD AUTO: 8.86 X10(3) UL (ref 1.5–7.7)
NEUTROPHILS NFR BLD AUTO: 81.3 %
PLATELET # BLD AUTO: 228 10(3)UL (ref 150–450)
RBC # BLD AUTO: 3.51 X10(6)UL
WBC # BLD AUTO: 10.9 X10(3) UL (ref 4–11)

## 2023-12-16 PROCEDURE — 82950 GLUCOSE TEST: CPT

## 2023-12-16 PROCEDURE — 85025 COMPLETE CBC W/AUTO DIFF WBC: CPT

## 2023-12-16 PROCEDURE — 36415 COLL VENOUS BLD VENIPUNCTURE: CPT

## 2023-12-16 PROCEDURE — 82728 ASSAY OF FERRITIN: CPT

## 2023-12-17 LAB — DEPRECATED HBV CORE AB SER IA-ACNC: 9.7 NG/ML

## 2023-12-19 DIAGNOSIS — O99.810 ABNORMAL MATERNAL GLUCOSE TOLERANCE, ANTEPARTUM: Primary | ICD-10-CM

## 2023-12-20 NOTE — PROGRESS NOTES
[facility] sent to pt. Provided test results and provider's recommendations.  Hold for read

## 2023-12-27 ENCOUNTER — TELEPHONE (OUTPATIENT)
Dept: OBGYN CLINIC | Facility: CLINIC | Age: 33
End: 2023-12-27

## 2023-12-27 NOTE — TELEPHONE ENCOUNTER
Pt calling states that she has been experiencing some pelvic pain/ leg pain for the last week. Admits its progressively getting worse and feels its hard to even lift legs; walk without waddling ; and even turning in bed. Pt is 27 wks and she is growing concerned.      Future Appointments   Date Time Provider Terry Drake   12/28/2023  8:00 AM PF [facility] OUTPT [facility]   1/4/2024  3:30 PM David Padilla MD [facility] [address]   2/1/2024  3:00 PM [facility] [id number] [facility]   2/1/2024  3:45 PM David Padilla MD [facility] N [facility] [place]   2/15/2024  3:30 PM David Padilla MD [facility] [address]   2/29/2024  3:30 PM CLAUDIA Dick [facility] [address]

## 2023-12-27 NOTE — TELEPHONE ENCOUNTER
26w6d   Patient reports a pelvic discomfort that started last Tuesday and has progressively gotten worse. Feels like a \"super bad bruise in my vagina area that hurts really bad when I move but it also hurts when I am resting\". Patient reports an intense pain in her pubic area when she tries to change positions from sitting to standing or turning in bed and then her legs feel very weak- once she is standing, the pain improves slightly and she is able to walk. Patient rates the pain/pressure at a 7 on a 1-10 scale. Patient has tried Tylenol and icing but they made no difference. Patient has experienced round ligament pain but states that this is very different  + baby movement, denies contractions, denies bleeding, denies leaking of fluid. Advised patient to try a belly support band, Tylenol and a heating pad to see if that helps. Patient aware that message is being sent to provider for any further recommendations. Instructed patient to be evaluated if her symptoms worsen prior to hearing from us. Patient verbalized understanding.

## 2023-12-27 NOTE — TELEPHONE ENCOUNTER
Notified patient that she needs an appointment to be evaluated for possible pubic symphysis pain. Patient verbalized understanding. Appointment scheduled for tomorrow with Dr. Amalia Rainey.

## 2023-12-28 ENCOUNTER — ROUTINE PRENATAL (OUTPATIENT)
Dept: OBGYN CLINIC | Facility: CLINIC | Age: 33
End: 2023-12-28
Payer: COMMERCIAL

## 2023-12-28 ENCOUNTER — LAB ENCOUNTER (OUTPATIENT)
Dept: LAB | Age: 33
End: 2023-12-28
Attending: NURSE PRACTITIONER
Payer: COMMERCIAL

## 2023-12-28 VITALS
BODY MASS INDEX: 27 KG/M2 | DIASTOLIC BLOOD PRESSURE: 70 MMHG | HEART RATE: 85 BPM | SYSTOLIC BLOOD PRESSURE: 106 MMHG | WEIGHT: 171.38 LBS

## 2023-12-28 DIAGNOSIS — G40.909 EPILEPSY AFFECTING PREGNANCY, ANTEPARTUM (HCC): ICD-10-CM

## 2023-12-28 DIAGNOSIS — M89.9 PUBIC BONE PAIN: ICD-10-CM

## 2023-12-28 DIAGNOSIS — O09.899 MATERNAL VARICELLA, NON-IMMUNE: ICD-10-CM

## 2023-12-28 DIAGNOSIS — Z34.00 SUPERVISION OF NORMAL FIRST PREGNANCY, ANTEPARTUM: Primary | ICD-10-CM

## 2023-12-28 DIAGNOSIS — O99.350 EPILEPSY AFFECTING PREGNANCY, ANTEPARTUM (HCC): ICD-10-CM

## 2023-12-28 DIAGNOSIS — O99.810 ABNORMAL MATERNAL GLUCOSE TOLERANCE, ANTEPARTUM: ICD-10-CM

## 2023-12-28 DIAGNOSIS — Z28.39 MATERNAL VARICELLA, NON-IMMUNE: ICD-10-CM

## 2023-12-28 DIAGNOSIS — Z34.90 ENCOUNTER FOR SUPERVISION OF NORMAL PREGNANCY, ANTEPARTUM, UNSPECIFIED GRAVIDITY: ICD-10-CM

## 2023-12-28 DIAGNOSIS — Z14.8 CARRIER OF MUSCULAR DYSTROPHY: ICD-10-CM

## 2023-12-28 DIAGNOSIS — Z23 NEED FOR VACCINATION: ICD-10-CM

## 2023-12-28 LAB
GLUCOSE 1H P GLC SERPL-MCNC: 148 MG/DL
GLUCOSE 2H P GLC SERPL-MCNC: 125 MG/DL
GLUCOSE 3H P GLC SERPL-MCNC: 83 MG/DL (ref 70–140)
GLUCOSE P FAST SERPL-MCNC: 75 MG/DL

## 2023-12-28 PROCEDURE — 3078F DIAST BP <80 MM HG: CPT | Performed by: OBSTETRICS & GYNECOLOGY

## 2023-12-28 PROCEDURE — 36415 COLL VENOUS BLD VENIPUNCTURE: CPT

## 2023-12-28 PROCEDURE — 82952 GTT-ADDED SAMPLES: CPT

## 2023-12-28 PROCEDURE — 3074F SYST BP LT 130 MM HG: CPT | Performed by: OBSTETRICS & GYNECOLOGY

## 2023-12-28 PROCEDURE — 82951 GLUCOSE TOLERANCE TEST (GTT): CPT

## 2023-12-28 NOTE — PROGRESS NOTES
NAT    GA: 27w0d  Vitals:    23 1543   BP: 106/70   Pulse: 85   Weight: 171 lb 6 oz (77.7 kg)       Doing well, +FM  Denies LOF/VB/uctx  Rh +, TDAP at next visit per patient request, EPDS 0  PTL and Fetal movement instructions given  Pubic symphysis pain, nontender with palpation. However, pain with ambulation and certain movements. Recommend pelvic for physical therapy. Recommend belly band. Discussed with patient possible pelvic girdle for stabilization following physical therapy recommendations. Precautions provided. Repeat HIV and RPR testing    Assessment:     Princess Green is a 34year old  at 27w0d who presents for routine OB visit. Overall doing well.      Problem List Items Addressed This Visit          Neuro    Epilepsy affecting pregnancy, antepartum ([address]       Gravid and     Maternal varicella, non-immune    Supervision of normal first pregnancy - Primary    Relevant Orders    Pelvic Floor Therapy - [facility]       Chromosomal and Congenital    Carrier of muscular dystrophy     Other Visit Diagnoses       Encounter for supervision of normal pregnancy, antepartum, unspecified         Relevant Orders    HIV Ag/Ab Combo    T Pallidum Screening [place]    Pubic bone pain        Relevant Orders    Pelvic Floor Therapy - [facility]    Need for vaccination        Relevant Orders    Immunization Admin Counseling, 1st Component, 18 years and older    TdaP (Boostrix/Adacel) Vaccine (> 7 Y)                Plan:     Patient Active Problem List    Diagnosis    Carrier of muscular dystrophy     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, aware and partner to be tested (results came back after transfer but from previous practice)- spouse is not but 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 However, please be advised that this is a medical document. It is intended as ktoe-ka-otrg communication. It is written and medical language may contain abbreviations or verbiage that are technical and unfamiliar. It may appear blunt or direct. Medical documents are intended to carry relevant information, facts as evident, and the clinical opinion of the practitioner.

## 2024-01-04 ENCOUNTER — PATIENT MESSAGE (OUTPATIENT)
Dept: FAMILY MEDICINE CLINIC | Facility: CLINIC | Age: 34
End: 2024-01-04

## 2024-01-04 ENCOUNTER — PATIENT MESSAGE (OUTPATIENT)
Dept: OBGYN CLINIC | Facility: CLINIC | Age: 34
End: 2024-01-04

## 2024-01-04 ENCOUNTER — ROUTINE PRENATAL (OUTPATIENT)
Dept: OBGYN CLINIC | Facility: CLINIC | Age: 34
End: 2024-01-04
Payer: COMMERCIAL

## 2024-01-04 VITALS
SYSTOLIC BLOOD PRESSURE: 102 MMHG | HEART RATE: 87 BPM | WEIGHT: 173 LBS | DIASTOLIC BLOOD PRESSURE: 72 MMHG | BODY MASS INDEX: 27 KG/M2

## 2024-01-04 DIAGNOSIS — O26.893 PELVIC PAIN AFFECTING PREGNANCY IN THIRD TRIMESTER, ANTEPARTUM: ICD-10-CM

## 2024-01-04 DIAGNOSIS — G40.909 SEIZURE DISORDER (HCC): Primary | ICD-10-CM

## 2024-01-04 DIAGNOSIS — R10.2 PELVIC PAIN AFFECTING PREGNANCY IN THIRD TRIMESTER, ANTEPARTUM: ICD-10-CM

## 2024-01-04 DIAGNOSIS — Z92.29 HISTORY OF TETANUS, DIPHTHERIA, AND ACELLULAR PERTUSSIS BOOSTER VACCINATION (TDAP): Primary | ICD-10-CM

## 2024-01-04 DIAGNOSIS — M45.9 ANKYLOSING SPONDYLITIS, UNSPECIFIED SITE OF SPINE (HCC): ICD-10-CM

## 2024-01-04 PROCEDURE — 3078F DIAST BP <80 MM HG: CPT | Performed by: STUDENT IN AN ORGANIZED HEALTH CARE EDUCATION/TRAINING PROGRAM

## 2024-01-04 PROCEDURE — 90715 TDAP VACCINE 7 YRS/> IM: CPT | Performed by: STUDENT IN AN ORGANIZED HEALTH CARE EDUCATION/TRAINING PROGRAM

## 2024-01-04 PROCEDURE — 3074F SYST BP LT 130 MM HG: CPT | Performed by: STUDENT IN AN ORGANIZED HEALTH CARE EDUCATION/TRAINING PROGRAM

## 2024-01-04 PROCEDURE — 90471 IMMUNIZATION ADMIN: CPT | Performed by: STUDENT IN AN ORGANIZED HEALTH CARE EDUCATION/TRAINING PROGRAM

## 2024-01-04 NOTE — PROGRESS NOTES
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  However, please be advised that this is a medical document.  It is intended as bren-ha-gegu communication.  It is written and medical language may contain abbreviations or verbiage that are technical and unfamiliar.  It may appear blunt or direct.  Medical documents are intended to carry relevant information, facts as evident, and the clinical opinion of the practitioner.      Adi Freitas MD

## 2024-01-04 NOTE — PATIENT INSTRUCTIONS
Pediatrician/Family Practice Referral    Pediatricians:    [facility]  [address]  [place], [place]  [phone]    [facility]  [address]  [place]   [phone]    [facility]  [address] [facility] [address]   [place], [place]  [phone]    [facility]  [address]  [place], [place]  [phone]        Family Practice:    Dr. Yolanda Jacobs, Dr. Daysi Ordaz and Dr. Eamon Rubi  [address]  [place], [place]  [phone]    Dr. Yaneth Marroquin and Dr. Live Reyes  [address] [facility] [address]   [place], [place]  [phone]    Dr. Leonard Estrada  [address] [facility] [address] [facility] [address]  [place], [place]  [phone]    Dr. Christen Galvez  [address]  [place], [place]  [phone]    Dr. Shira Harris  [address] [place], [place]  [phone]    Dr. Veronica Deluca  [address] [place] [address]  [phone]    Dr. Meena Mendez, Dr. Chema Boswell and Dr. Quita Betts  [address] [place] [address]  [phone]    Dr. Dora Butts  [address] [facility] [address]  [place], [place]  [phone]     KICK COUNT INSTRUCTIONS    After 28 weeks of pregnancy, we would like for you to monitor your baby’s movement daily by doing kick counts, an easy way for you to assess your baby’s well-being.    How to count kicks:  Choose a time when the baby is active, such as after a meal.  Sit comfortably or lie on your side, and count the number of movements in an hour… you should feel 10 movements in 2 hours    The evening hours seem to be the most convenient time to do this test, although you can also do this test anytime you are concerned about the baby’s movement.    Call the office right away at [phone] if you notice any of the following:  Your baby moves less than 10 times in 2 hours while you are doing kick counts.  Your baby moves much less often than on the days before.  You have not felt your baby move all day.

## 2024-01-05 NOTE — TELEPHONE ENCOUNTER
From: Shilpa Lopez  To: Loretta Pickard  Sent: 1/4/2024 7:15 PM CST  Subject: Referrals    Hello Dr. Pickard,     I was wondering if you could please put in referrals for me for neurology and rheumatology. At my last appointment we discussed this because my insurance switched from a PPO to an HMO.     Thank you!   Shilpa Lopez

## 2024-01-07 PROBLEM — R10.2 PELVIC PAIN AFFECTING PREGNANCY: Status: ACTIVE | Noted: 2024-01-07

## 2024-01-07 PROBLEM — J45.909 ASTHMA: Status: ACTIVE | Noted: 2021-08-06

## 2024-01-07 PROBLEM — O26.899 PELVIC PAIN AFFECTING PREGNANCY: Status: ACTIVE | Noted: 2024-01-07

## 2024-01-07 PROBLEM — O26.899 PELVIC PAIN AFFECTING PREGNANCY (HCC): Status: ACTIVE | Noted: 2024-01-07

## 2024-01-07 PROBLEM — R10.2 PELVIC PAIN AFFECTING PREGNANCY (HCC): Status: ACTIVE | Noted: 2024-01-07

## 2024-01-07 PROBLEM — J45.909 ASTHMA (HCC): Status: ACTIVE | Noted: 2021-08-06

## 2024-01-07 PROBLEM — M45.7 ANKYLOSING SPONDYLITIS OF LUMBOSACRAL REGION (HCC): Status: ACTIVE | Noted: 2019-04-22

## 2024-01-07 PROBLEM — N30.10 INTERSTITIAL CYSTITIS: Status: ACTIVE | Noted: 2017-05-16

## 2024-01-08 ENCOUNTER — TELEPHONE (OUTPATIENT)
Dept: NEUROLOGY | Facility: CLINIC | Age: 34
End: 2024-01-08

## 2024-01-08 NOTE — TELEPHONE ENCOUNTER
Spoke with pt to confirm appointment cancellation due to pt hmo ins being out of network   -pt acknowledged   -appointment canceled

## 2024-01-15 ENCOUNTER — ROUTINE PRENATAL (OUTPATIENT)
Dept: OBGYN CLINIC | Facility: CLINIC | Age: 34
End: 2024-01-15
Payer: COMMERCIAL

## 2024-01-15 ENCOUNTER — TELEPHONE (OUTPATIENT)
Dept: PHYSICAL THERAPY | Facility: HOSPITAL | Age: 34
End: 2024-01-15

## 2024-01-15 VITALS
BODY MASS INDEX: 27.66 KG/M2 | HEIGHT: 67 IN | HEART RATE: 96 BPM | DIASTOLIC BLOOD PRESSURE: 64 MMHG | WEIGHT: 176.25 LBS | SYSTOLIC BLOOD PRESSURE: 110 MMHG

## 2024-01-15 DIAGNOSIS — Z34.00 SUPERVISION OF NORMAL FIRST PREGNANCY, ANTEPARTUM: Primary | ICD-10-CM

## 2024-01-15 NOTE — PROGRESS NOTES
NAT  Doing well, no immediate concerns or questions  GOOD FM  Denies VB/LOF/uctx  RTC in 2 wks  Fetal movement discussed

## 2024-01-16 ENCOUNTER — OFFICE VISIT (OUTPATIENT)
Dept: PHYSICAL THERAPY | Age: 34
End: 2024-01-16
Attending: OBSTETRICS & GYNECOLOGY
Payer: COMMERCIAL

## 2024-01-16 DIAGNOSIS — M89.9 PUBIC BONE PAIN: ICD-10-CM

## 2024-01-16 DIAGNOSIS — Z34.00 SUPERVISION OF NORMAL FIRST PREGNANCY, ANTEPARTUM: Primary | ICD-10-CM

## 2024-01-16 PROCEDURE — 97162 PT EVAL MOD COMPLEX 30 MIN: CPT

## 2024-01-16 PROCEDURE — 97112 NEUROMUSCULAR REEDUCATION: CPT

## 2024-01-16 NOTE — PATIENT INSTRUCTIONS
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 She subsequently completed her Doctor of Physical Therapy from [employer] in 2013. Kassandra’s experience is primarily with orthopedics, but also has experience in pediatrics as well.     Kassandra has been a certified provider of the Gr[employer] instrument-assisted soft tissue mobilization since 2015 & has further earned the title of [employer] Specialist in 2020. Kassandra is continuing her passion for learning by pursuing training through the [facility] to effectively treat patients with pelvic floor related disorders. 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    Kassandra thrives on treating patients with empathy & compassion to provide the individualized care that all patients need & deserve. Kassandra aims to empower patients with the tools to feel in charge of their recovery, knowing they can advocate for themselves & achieve maximum success when they understand the treatments that work best for them.    When Kassandra is not at work, she enjoys exercising with [employer] classes, scenic walks outside, baking, & traveling with her family.     Kassandra is accepting new patients at the [facility]. To schedule or change an appointment, call [phone]. To speak with Kassandra, call [phone] or message on [employer]/ BRIANNA tamez          Access Code: [email or web address]  URL: [email or web address]  Date: 01/16/2024  Prepared by: Kassandra Randhawa    Exercises  - Seated Hip Abduction with Resistance  - 1 x daily - 3-5 x weekly - 10 reps - 10 sec hold  - Seated Hip  Adduction Squeeze with Ball  - 1 x daily - 3-5 x weekly - 10 reps - 10 sec hold

## 2024-01-16 NOTE — PROGRESS NOTES
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 Denies N/T. Pt reports the week of  she developed a lot of pain first with trying to roll over in bed. Almost felt like a bruise-pain. Front of pelvis felt she was getting punched. Progressed to getting in & out of bed & car; walking too fast or too much. Pt did start wearing a belt belly band. Does feel some home exercises have helped her: breathing, kegels, pelvic tilt. Sleeping still the worst but feels more functional. Wakes up about every 1.5 hours. Not comfortable.    Pt describes pain level: current 1/10, best 1/10, worst 8/10. Heating pad to back at night - helped. Relief with ice  Quality: aching and throbbing    Pregnant Now: Yes, 29w5d GUANAKO 3/28/24  Obstetrical/Gynecological history: ; expecting baby boy - [place]  Occupation/Activities:   PFDI-20: 104.17/300; Impairment= 34.72 %  PFDI 20    Scores   POPDI 6:    29.17   CRAD 8:    25   HANNAH 6:    50   Summary:    104.17      Shilpa describes prior level of function: hx LBP (spring 2018 dx ankylosing spondylitis with hx different meds); IC (cauterized hunner's lesions) - currently controlled (diet). Pt goals include \"sleep better.\"  Past medical history was reviewed with Shilpa. Significant findings include  has a past medical history of Ankylosing  spondylitis ([facility]), Asthma, Extrinsic asthma, unspecified, GERD (gastroesophageal reflux disease), Hematuria, History of gross hematuria (2012), History of recurrent UTIs (2012), Interstitial cystitis, Irritable bowel, Knee pain, MRSA infection (02/2012), Other and unspecified ovarian cysts, Pain in both knees, unspecified chronicity, Pelvic inflammatory disease, PONV (postoperative nausea and vomiting), Right ankle pain, unspecified chronicity, Right rotator cuff tendonitis, and Seizure disorder ([facility]) (02/2016).    She has no past medical history of Anesthesia complication, Difficult intubation, Malignant hyperthermia, or Pseudocholinesterase deficiency. Denies hx STI    URINARY HABITS  Types of symptoms: stress incontinence, urge incontinence, and nocturia; urgency  Events associated with the onset of urinary complaints: cough, walking to bathroom  Urinary Frequency: varied, every 1-2 hours  Leaking occurs: yes  Episodes of Leakage: varied, not often (rare)  Nocturia: 4  Empty bladder just in case: not often    BOWEL HABITS  Types of symptoms: other none    Frequency of bowel movements: 1x morning  Stool consistency: Carver Stool Scale: 4  Do you strain with defecation: No   Laxative use: No    SEXUAL HEALTH STATUS  Sexual Zion Status: reduced frequency, modified positions  Pain with initial and/or deep penetration: no  Pain with pelvic exam/tampon use: no  Not on pelvic rest    ASSESSMENT  Shilpa presents to physical therapy evaluation with primary c/o pain to pubic symphysis > LBP; radiating pain from inguinal muscles to the back. The results of the objective tests and measures show impairments in posture, pelvic alignment, gait mechanics, ROM, strength, flexibility. Functional deficits include but are not limited to VALE with cough; UUI & urgency with walking to bathroom; 4x nocturia; increased urinary frequency every 1-2 hours; sleeping without waking every 1.5 hours; transfers in<>out car & bed;  prolonged & fast walking. Signs and symptoms are consistent with diagnosis of Supervision of normal first pregnancy, antepartum (Z34.00) Pubic bone pain (M89.9) Pelvic pain affecting pregnancy in third trimester, antepartum (O26.893,R10.2). Pt and PT discussed evaluation findings, pathology, POC and HEP. Pt voiced understanding and performs HEP correctly without reported pain. Skilled Pelvic Physical Therapy is medically necessary to address the above impairments and reach functional goals.    OBJECTIVE:   Supine HOB elevated to 45 degrees for pregnancy precaution    Posture: increased challenge with bed mobility & log rolling. Pregnancy swayback in stance  Pelvic Alignment: R post ilial rotation  Gait: pt ambulates on level ground with trendelenburg/waddle.     External Palpation: STRs R piriformis/ glutes. TTP R>L PSIS. Increased tension B T/L PSM. B calf supple & pain-free with pedal pulses intact & symmetrical  Abdominal Wall Integrity: coning present    Range Of Motion: Lumbar AROM screen: defer. Thoracic AROM screen: Mod restricted R/L rotation    Strength (MMT): Hip Abd: R 3-/5 with pain; L 3/5    Flexibility Summary: Min restricted B HS    Breathing Mechanics: fair abdominal & ribcage expansion with inhalation    Special Tests: ASLR + (reduced pain with manual anterior stabilization)    Informed consent for internal pelvic evaluation given: Yes, defer until PT confirms clearance from OBGYN.    Today's Treatment and Response:   Patient education was provided on objective findings of external evaluation and expectations with treatment outcomes. Educated on diaphragmatic breathing for PNS activation and pelvic floor relaxation , log rolling; ice as needed - limit heat -> avoid at abdomen; pillow between knees in sidelying for sleeping; HOB elevated in supine    Patient was instructed in and issued a HEP for: self shotgun MET pubic symphysis with breathing coordination (upon exhalation) to prevent coning &  clark    Access Code: [email or web address]  URL: [email or web address]  Date: 01/16/2024  Prepared by: Kassandra Randhawa    Exercises  - Seated Hip Abduction with Resistance  - 1 x daily - 3-5 x weekly - 10 reps - 10 sec hold (Blue TheraBand)  - Seated Hip Adduction Squeeze with Ball  - 1 x daily - 3-5 x weekly - 10 reps - 10 sec hold (or folded pillow, rolled blanket)    Performed manual MET to correct innominate rotation with manual shotgun MET    Recommend SI/ Serola belt    Charges: PT Eval Moderate Complexity, Neuro x 1      Total Timed Treatment: 15 min     Total Treatment Time: 48 min     Based on clinical rationale and outcome measures, this evaluation involved Moderate Complexity decision making due to 3+ personal factors/comorbidities, 4+ body structures involved/activity limitations, and evolving symptoms including stress urinary incontinence, urge urinary incontinence, and pelvic pain  PLAN OF CARE:    Goals: (to be met in 8 visits)  Patient will be able to perform Levator Ani/ Transverse Abdominis (Pelvic Brace) contraction with proper technique to prevent VALE at least 90% of the time with ADLs including cough.  Patient will report a reduction of urinary urgency with the ability to prevent UUI at least 90% of the time while walking to the bathroom with use of urge deferment techniques.  Patient will exhibit an increase in B hip Abduction strength to 4/5 to allow for home & community ambulation including errands & medical appointments.  Patient will report a reduction of nocturia from 4x to 1x for improved sleep & ability to function daytime while working.  Patient will report pain at worst 4/10 with transfers in<>out of car & bed.  Patient understands importance of performing HEP to prevent reoccurrence of symptoms.     Frequency / Duration: Patient will be seen for 1-2 x/week or a total of 8 visits over a 90 day period. Treatment will include: Manual Therapy, Neuromuscular Re-education, Self-Care Home  Management, Therapeutic Activities, Therapeutic Exercise, and Home Exercise Program instruction     Education or treatment limitation: None  Rehab Potential:excellent    Patient/Family/Caregiver was advised of these findings, precautions, and treatment options and has agreed to actively participate in planning and for this course of care.    Thank you for your referral. Please co-sign or sign and return this letter via fax as soon as possible to [phone]. If you have any questions, please contact me at Dept: [phone]    Sincerely,  Electronically signed by therapist: Kassandra Randhawa PT    Physician's certification required: Yes  I certify the need for these services furnished under this plan of treatment and while under my care.    X___________________________________________________ Date____________________    Certification From: 1/16/2024  To:4/15/2024

## 2024-01-17 ENCOUNTER — TELEPHONE (OUTPATIENT)
Dept: OBGYN CLINIC | Facility: CLINIC | Age: 34
End: 2024-01-17

## 2024-01-17 NOTE — TELEPHONE ENCOUNTER
Received breast pump orders from [facility], Dr. David signed and I faxed back. Copy in plfd

## 2024-01-18 NOTE — TELEPHONE ENCOUNTER
From: Shilpa Lopez  To: Adi Freitas  Sent: 1/4/2024 7:18 PM CST  Subject: PT Referral     Hi Dr. Freitas,     Since we talked about a lot today I just wanted to follow up and make sure you are able to put in a referral for the pelvic floor physical therapy for me. I already have appointments scheduled, but I'll need my insurance to approve the referral before I attend.     Thank you!   Shilpa Lopez

## 2024-01-18 NOTE — TELEPHONE ENCOUNTER
Per chart review- patient has an approved referral for pelvic floor physical therapy.    Letter 01/15/2024 12:12 PM Roseann Whitaker Auto: [id number] IHP REVIEW RFL -   Note:  PATIENT NAME:  JANAE TORREZ/ [phone] (home)/ There is no work phone number on file.  PATIENT :  1990  REFERRAL ID #:  [id number]  REFERRAL STATUS:  Authorized [1]  REVIEW REFERRAL NOTES FOR MORE INFORMATION:  DATE AUTHORIZED:  2024 // EXPIRATION DATE: 04/15/2024   REFERRED BY:  CHERELLE HOWELL MD (TEL: [phone])  REFERRED TO: No referral provider, MD (TEL: No Referred to Provider on Referral)     No vendor specified on referral. / No vendor phone number known.  Edw Edward  REFERRED FOR:    Diagnoses:    Z34.00 (ICD-10-CM) - V22.0 (ICD-9-CM) - Supervision of normal first pregnancy, antepartum    M89.9 (ICD-10-CM) - 733.90 (ICD-9-CM) - Pubic bone pain  Procedures:     [id number] - PELVIC FLOOR THERAPY - INTERNAL   NUMBER OF VISITS AUTH: 8

## 2024-01-23 ENCOUNTER — OFFICE VISIT (OUTPATIENT)
Dept: NEUROLOGY | Facility: CLINIC | Age: 34
End: 2024-01-23
Payer: COMMERCIAL

## 2024-01-23 VITALS
BODY MASS INDEX: 28 KG/M2 | RESPIRATION RATE: 16 BRPM | DIASTOLIC BLOOD PRESSURE: 56 MMHG | WEIGHT: 178.19 LBS | SYSTOLIC BLOOD PRESSURE: 124 MMHG | HEART RATE: 99 BPM

## 2024-01-23 DIAGNOSIS — E55.9 VITAMIN D DEFICIENCY: ICD-10-CM

## 2024-01-23 DIAGNOSIS — R56.9 CONVULSIONS, UNSPECIFIED CONVULSION TYPE (HCC): Primary | ICD-10-CM

## 2024-01-23 DIAGNOSIS — Z79.899 ENCOUNTER FOR LONG-TERM (CURRENT) USE OF HIGH-RISK MEDICATION: ICD-10-CM

## 2024-01-23 PROCEDURE — 99205 OFFICE O/P NEW HI 60 MIN: CPT | Performed by: OTHER

## 2024-01-23 PROCEDURE — 3074F SYST BP LT 130 MM HG: CPT | Performed by: OTHER

## 2024-01-23 PROCEDURE — 3078F DIAST BP <80 MM HG: CPT | Performed by: OTHER

## 2024-01-23 NOTE — PATIENT INSTRUCTIONS
Refill policies:    Allow 2-3 business days for refills; controlled substances may take longer.  Contact your pharmacy at least 5 days prior to running out of medication and have them send an electronic request or submit request through the “request refill” option in your [employer] account.  Refills are not addressed on weekends; covering physicians do not authorize routine medications on weekends.  No narcotics or controlled substances are refilled after noon on Fridays or by on call physicians.  By law, narcotics must be electronically prescribed.  A 30 day supply with no refills is the maximum allowed.  If your prescription is due for a refill, you may be due for a follow up appointment.  To best provide you care, patients receiving routine medications need to be seen at least once a year.  Patients receiving narcotic/controlled substance medications need to be seen at least once every 3 months.  In the event that your preferred pharmacy does not have the requested medication in stock (e.g. Backordered), it is your responsibility to find another pharmacy that has the requested medication available.  We will gladly send a new prescription to that pharmacy at your request.    Scheduling Tests:    If your physician has ordered radiology tests such as MRI or CT scans, please contact Central Scheduling at [phone] right away to schedule the test.  Once scheduled, the [facility] Centralized Referral Team will work with your insurance carrier to obtain pre-certification or prior authorization.  Depending on your insurance carrier, approval may take 3-10 days.  It is highly recommended patients assure they have received an authorization before having a test performed.  If test is done without insurance authorization, patient may be responsible for the entire amount billed.      Precertification and Prior Authorizations:  If your physician has recommended that you have a procedure or additional testing performed the [facility]  Centralized Referral Team will contact your insurance carrier to obtain pre-certification or prior authorization.    You are strongly encouraged to contact your insurance carrier to verify that your procedure/test has been approved and is a COVERED benefit.  Although the [facility] Centralized Referral Team does its due diligence, the insurance carrier gives the disclaimer that \"Although the procedure is authorized, this does not guarantee payment.\"    Ultimately the patient is responsible for payment.   Thank you for your understanding in this matter.  Paperwork Completion:  If you require FMLA or disability paperwork for your recovery, please make sure to either drop it off or have it faxed to our office at [phone]. Be sure the form has your name and date of birth on it.  The form will be faxed to our Forms Department and they will complete it for you.  There is a 25$ fee for all forms that need to be filled out.  Please be aware there is a 10-14 day turnaround time.  You will need to sign a release of information (QUINN) form if your paperwork does not come with one.  You may call the Forms Department with any questions at [phone].  Their fax number is [phone].

## 2024-01-23 NOTE — PROGRESS NOTES
Neurology New History & Physical    CHIEF COMPLAINT / REASON FOR VISIT:    Chief Complaint   Patient presents with    Seizures     Pt had to switch neurologist due to insurance and 31 weeks pregnant with hx of seizures. Unable to keep seizure med levels.      HISTORY OBTAINED FROM:  Patient and others as above  Data review (see below)    HISTORY OF PRESENT ILLNESS:  Shilpa Lopez is a 33 year old  female with seizures since around age 12 with events of LOC without shaking.  In 2016 (age 25) she had another event of LOC with urinary incontinence without shaking.  Was told EEG abnormal at this time.  Started LEV and had no recurrent events of this type.    Feb 2023 she had a different event where she felt hot, disoriented, \"stomach dropped\" but did not lose consciousness.  This lasted a few minutes.  These symptoms do not occur during other events.      No other staring events.    She is pregnant, GUANAKO 3/28/24.  Dr. Baker has been gradually increasing LEV - now up to 2500 mg daily (was on 750 mg prior to Feb 2023 seizure, was increased from 1000 to 1250 due to low level but by July 2023 was already pregnant).    No side effects, mood is stable.    Driving status:  Driving    Previous medication trials:  None    Epilepsy risk factors:  Normal birth and development   No febrile or childhood seizures   No meningitis/encephalitis (had LP as an infant for fever, but was ok)  No head trauma with prolonged LOC/amnesia   No known structural brain lesions    DATA REVIEWED:  As documented in the HPI    LEV levels  Dec 22 2023 10.8  Dec 4 2023 6.8  Nov 22 2023 4.4  Oct 20 2023 10  Jul 19 2023 12  Mar 6 2023 6.4    Nov 2023, Jul 2023, Mar 2023  Neuro note (Samuel / Delilah)    Feb 2023  MRI brain unremarkable     Jan 2023  EEG unremarkable     2016   MRI brain unremarkable   EEG 3 Hz gen spike and wave    NEUROLOGICAL FAMILY HISTORY:  No seizures    PAST MEDICAL HISTORY:  Past Medical History:   Diagnosis Date    Ankylosing  spondylitis (HCC)     Asthma     Extrinsic asthma, unspecified     sports induced    GERD (gastroesophageal reflux disease)     Hematuria     weird presentation of ibs    History of gross hematuria 2012    History of recurrent UTIs 2012    Interstitial cystitis     Irritable bowel     Knee pain     MRSA infection 02/2012    Eye    Other and unspecified ovarian cysts     Pain in both knees, unspecified chronicity     Pelvic inflammatory disease     PONV (postoperative nausea and vomiting)     nausea    Right ankle pain, unspecified chronicity     Right rotator cuff tendonitis     Seizure disorder (HCC) 02/2016    Sees Dr. Lin- last seizure November 2015       PAST SURGICAL HISTORY:  Past Surgical History:   Procedure Laterality Date    COLONOSCOPY  02/01/2012    nl ti, nl colon, nl random colon bx.    COLONOSCOPY N/A 05/21/2018    Procedure: COLONOSCOPY;  Surgeon: Henry Carpio MD;  Location:  ENDOSCOPY    OTHER SURGICAL HISTORY  03/30/2012    Cystoscopy - Dr. Vidal     OTHER SURGICAL HISTORY  01/01/2012    Negative - ab. 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, commutes, 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[employer]    Vaping Use    Vaping Use: Never used   Substance and Sexual Activity    Alcohol use: Yes     Alcohol/week: 0.0 standard drinks of alcohol     Comment: 2 a day when healthy    Drug use: No     Comment: Pt has medical card for use    Sexual activity: Yes     Partners: Male   Other Topics Concern     Caffeine Concern Yes     Comment: 1/2 coffee a couple times a week    Exercise No       ALLERGIES:  Allergies   Allergen Reactions    Amitriptyline FATIGUE, HALLUCINATION, OTHER (SEE COMMENTS), NAUSEA AND VOMITING and CONFUSION     Profound sleepiness.    Causes lethargy   Other reaction(s): FATIGUE   Strange dreams and sleeping too much   Profound sleepiness.    Severe fatique    Amoxicillin-Pot Clavulanate NAUSEA AND VOMITING    Etanercept FATIGUE, OTHER (SEE COMMENTS) and NAUSEA AND VOMITING     Paralysis of leg   Other reaction(s): Weakness   Lower extremity weakness    Lower extremity weakness    Leg paralysis    Paralysis of leg   Other reaction(s): Weakness   Lower extremity weakness   Lower extremity weakness    Augmentin [Amoclan] NAUSEA AND VOMITING       CURRENT MEDICATIONS:   levetiracetam 1000 MG Oral Tab Take 2.5 tablets (2,500 mg total) by mouth daily.      Prenatal Vit w/Ja-Nhngsgnsb-SH (PNV OR) Take by mouth daily.      folic acid 1 MG Oral Tab Take 4 tablets (4 mg total) by mouth daily.      Cholecalciferol (VITAMIN D) 1000 units Oral Tab Take by mouth daily.         REVIEW OF SYSTEMS:  Patient did not have additional neurological, psychiatric, respiratory, cardiovascular, gastrointestinal, or genitourinary symptoms.    PHYSICAL EXAMINATION:  /56 (BP Location: Right arm, Patient Position: Sitting, Cuff Size: adult)   Pulse 99   Resp 16   Wt 178 lb 3.2 oz (80.8 kg)   LMP 06/22/2023   BMI 27.91 kg/m²     Gen: WDWN, in NAD  MSE: AAOx3, nl language, nl attn/conc, nl fund of knowledge  CN: II - PERRL, VFF; Ill, IV, VI - EOMI, no nystagmus; V - nl facial sensation bilaterally; VII - nl facial mvmt bilaterally; VIII - nl hearing bilaterally; IX - nl palate elevation bilaterally; X - nl voice; XI - nl shoulder shrug b/I; XII - nl tongue movement  Motor: 5/5 x4; no drift; normal tone; no abnormal movements  Sensory: nl vibration x4  Coord: nl FTN b/I  Reflex: 2+ bilaterally in upper and lower  extremities  Gait: normal gait       ASSESSMENT & PLAN:      ICD-10-CM    1. Convulsions, unspecified convulsion type (HCC)  R56.9       2. Encounter for long-term (current) use of high-risk medication  Z79.899 Levetiracetam, Serum      3. Vitamin D deficiency  E55.9 Vitamin D        Presumed generalized epilepsy in pregnancy, which is a high risk state.  Epilepsy has been well controlled for just under a year.    Continue on folate 4 mg and PNV.  Continue LEV 1791-1663 mg XR daily, requiring intense monitoring, check level monthly (today and in one month) and may increase depending on level.  Pre-pregnancy, she was at a level of 6.4 on 1000 mg daily, was increased to 1250 mg daily and then became pregnant, level was 12.  So post delivery we should aim to return to 1250 mg daily.  I have included ob/gyn guidance at the end of my note.    We discussed medication side effects and activity precautions.  We discussed precautions surrounding pregnancy and delivery, including that she may breastfeed if she wants, she should take precautions with changing and bathing the baby, and try to minimize sleep deprivation as much as realistically possible.  We discussed symptoms that would warrant urgent/emergent evaluation. Patient verbalized understanding and agreement.    Return in about 6 weeks (around 3/5/2024).    For the ob/gyn:    Shilpa Lopez has a history of epilepsy, and given her pending delivery, I am providing a list of standard recommendation for any such patient that is undergoing a procedure that creates emotional or physical stress on their system, in order to mitigate risk.  Please bear in mind that these are not necessarily patient-specific recommendations.    1. Please allow the patient to take their regular home doses of anti-seizure drugs (ASDs) with a sip of water, without delay, even if the patient is kept NPO.  Even jovan- and post-delivery, the regular ASDs should not be missed.  Some ASDs are not  available in IV form and therefore must be given orally.      2. In addition to not missing medications, maintaining adequate hydration and avoiding hypoglycemia are also important in preventing seizures.  Long durations of NPO status should be avoided.  Sleep deprivation and infection (particularly fever) can also trigger seizures, but of course sometimes these situations cannot be prevented.    3. Lorazepam (e.g., 1-2 mg IV) can be beneficial in managing seizures during and after the delivery/procedure; this should not be given routinely, but can be considered in an urgent situation at the discretion of the attending obstetrician, proceduralist, and/or anesthesiologist.    4. Please avoid the use of the following medications, as they tend to reduce the seizure threshold:   Analgesics:  Ultram, Tramadol, Demerol    Antibiotics: Fluoroquinolones, Carbapenems, Cefepime    5. Please bear in mind that if the patient experiences a seizure around the time of delivery, it is important to determine if the seizure was similar or different from the patient's usual seizure symptom and frequency (this can be done by history). Depending on the situation, causes other than the patient's known epilepsy may need to be considered (e.g., PRES, eclampsia, cerebral venous sinus thrombosis).    6. In general, the benefits of breastfeeding outweigh the risks of ASD exposure to the infant.    7. If the patient is on lamotrigine or levetiracetam, the dose should be lowered after delivery to the pre-pregnancy dose.  There are no standard guidelines for lowering the doses, but one consideration would be to remove half of the added dose the day after delivery, and the other half one week after.  For example:     a. the patient was changed from levetiracetam 1250 mg daily (before pregnancy) to 2500 mg daily (towards the end of pregnancy)   b. the day after delivery, the dose should be reduced to 2000 mg daily   c. one week after delivery, the  dose should be reduced to 1250 mg daily         To summarize medical decision making:  I spent 61 minutes on the day of the encounter related to this visit, not including separately reported activities or procedures.        Thaddeus Monge MD, FAES  Board-Certified in Neurology, Epilepsy, and Clinical Neurophysiology  [facility]

## 2024-01-24 ENCOUNTER — OFFICE VISIT (OUTPATIENT)
Dept: PHYSICAL THERAPY | Age: 34
End: 2024-01-24
Attending: OBSTETRICS & GYNECOLOGY
Payer: COMMERCIAL

## 2024-01-24 ENCOUNTER — TELEPHONE (OUTPATIENT)
Dept: OBGYN CLINIC | Facility: CLINIC | Age: 34
End: 2024-01-24

## 2024-01-24 DIAGNOSIS — O99.350 EPILEPSY AFFECTING PREGNANCY, ANTEPARTUM (HCC): Primary | ICD-10-CM

## 2024-01-24 DIAGNOSIS — G40.909 EPILEPSY AFFECTING PREGNANCY, ANTEPARTUM (HCC): Primary | ICD-10-CM

## 2024-01-24 PROCEDURE — 97140 MANUAL THERAPY 1/> REGIONS: CPT

## 2024-01-24 PROCEDURE — 97110 THERAPEUTIC EXERCISES: CPT

## 2024-01-24 NOTE — PROGRESS NOTES
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 On her feet all day today. Could be also sitting all day yesterday. Overall better since she was first referred. Denies dizziness, SOB    Pain: 5/10 center & R/L      Objective: See Flowsheet for details  1/24/2024: Vitals: BP R brachial 1) 98/63, pulse 90; 2) 87/62, pulse 84; 3) 84/62, pulse 102. Pelvic symmetry. TTP with STRs along B T/L PSM. B pedal pulses intact, calf supple & pain-free. Hypomob thoracic central PA joint mobility.      Assessment: Pt reported relief with standing soft tissue work (see below). Pt reported feeling uncomfortable after prolonged seated position during thoracic mob, & thus further treatment in this position deferred at this time. Pt reported immediate resolution of prior discomfort upon change from this position. Pt reported pain down to 2/10 by end of visit.      Goals: (to be met in 8 visits)  Patient will be able to perform Levator Ani/ Transverse Abdominis (Pelvic Brace) contraction with proper technique to prevent VALE at least 90% of the time with ADLs including cough.  Patient will report a reduction of urinary urgency with the ability to prevent UUI at least 90% of the time while walking to the bathroom with use of urge deferment techniques.  Patient will exhibit an increase in B hip Abduction strength to 4/5 to allow for home & community ambulation including errands & medical appointments.  Patient will report a reduction  of nocturia from 4x to 1x for improved sleep & ability to function daytime while working.  Patient will report pain at worst 4/10 with transfers in<>out of car & bed.  Patient understands importance of performing HEP to prevent reoccurrence of symptoms.     Plan: Continued goal of pain reduction.  Date: 1/24/2024  TX#: 2/8 Date:                 TX#: 3/ Date:                 TX#: 4/ Date:                 TX#: 5/ Date:   Tx#: 6/   Therex: 25'  Vitals check  Seated thoracic rotation 5x10\" ea  Standing at wall open book stretch x 5 ea  Seated figure-4 piriformis stretch 2x30\" ea   Concurrent with ex's: education re: DULCE, lift belly with sit<>stand  Manual Therapy: 15'  Standing thoracolumbar PSM & fascial release, STM/ MFR B QL & upper glutes. Use of emollient  Seated tableside with pillow T-spine CPA mobs gr I-III       HEP: self shotgun MET pubic symphysis with breathing coordination (upon exhalation) to prevent coning & straining    Access Code: [email or web address]  URL: [email or web address]  Date: 01/16/2024  Prepared by: Kassandra Randhawa    Exercises  - Seated Hip Abduction with Resistance  - 1 x daily - 3-5 x weekly - 10 reps - 10 sec hold (Blue TheraBand)  - Seated Hip Adduction Squeeze with Ball  - 1 x daily - 3-5 x weekly - 10 reps - 10 sec hold (or folded pillow, rolled blanket)    Charges: Therex x 2, Manual x 1     Total Timed Treatment: 40 min  Total Treatment Time: 40 min

## 2024-01-25 NOTE — PROGRESS NOTES
Outpatient Maternal-Fetal Medicine Follow-Up    Dear Dr. David    Thank you for requesting an ultrasound and maternal-fetal medicine consultation on your patient Shilpa Lopez.  As you are aware she is a 33 year old female  with a farley pregnancy and an Estimated Date of Delivery: 3/28/24.  She returned to maternal-fetal medicine today for a follow-up visit.  Her history was reviewed from her prior visit and there were no interval changes.    Antepartum Risk Factors  Maternal seizure disorder: Controlled on Keppra    PHYSICAL EXAMINATION:  /67 (BP Location: Right arm, Patient Position: Sitting, Cuff Size: large)   Pulse 95   Ht 5' 7\" (1.702 m)   Wt 180 lb (81.6 kg)   LMP 2023   BMI 28.19 kg/m²   General: alert and oriented, no acute distress  Abdomen: gravid, soft, non-tender  Extremities: non-tender, no edema    OBSTETRIC ULTRASOUND  The patient had a follow-up growth ultrasound today which revealed normal interval fetal growth, BPP 8/8.    Ultrasound Findings:  Single IUP in cephalic presentation.    Placenta is left lateral.   A 3 vessel cord is noted.  Cardiac activity is present at 136 bpm  EFW 2276 g ( 5 lb 0 oz); 73%.    VIOLA is  18.2 cm.  MVP is 6.3 cm  BPP is 8/8.     The fetal measurements are consistent with established EDC. No gross ultrasound evidence of structural abnormalities are seen today. The patient understands that ultrasound cannot rule out all structural and chromosomal abnormalities.     See Imaging Tab For Complete Ultrasound Report  I interpreted the results and reviewed them with the patient.    DISCUSSION  During her visit we discussed and reviewed the following issues:  MATERNAL SEIZURE DISORDER  See prior [facility] notes for a detailed review.    IMPRESSION:  IUP at 32w0d  Maternal seizure disorder: Controlled on Keppra     RECOMMENDATIONS:  Continue care with Dr. David  Continue comanagement with neurology      Thank you for allowing me to participate in the  care of your patient.  Please do not hesitate to contact me if additional questions or concerns arise.      Dany Hutchins M.D.    20 minutes spent in review of records, patient consultation, documentation and coordination of care.  The relevant clinical matter(s) are summarized above.     Note to patient and family  The 21st Century Cures Act makes medical notes available to patients in the interest of transparency.  However, please be advised that this is a medical document.  It is intended as xiah-au-cloe communication.  It is written and medical language may contain abbreviations or verbiage that are technical and unfamiliar.  It may appear blunt or direct.  Medical documents are intended to carry relevant information, facts as evident, and the clinical opinion of the practitioner.

## 2024-01-31 ENCOUNTER — OFFICE VISIT (OUTPATIENT)
Dept: PHYSICAL THERAPY | Age: 34
End: 2024-01-31
Attending: OBSTETRICS & GYNECOLOGY
Payer: COMMERCIAL

## 2024-01-31 PROCEDURE — 97110 THERAPEUTIC EXERCISES: CPT

## 2024-01-31 PROCEDURE — 97112 NEUROMUSCULAR REEDUCATION: CPT

## 2024-01-31 NOTE — PROGRESS NOTES
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 More painful getting in/ out of bed 4/10, walking around 3/10. Maybe more pressure than pain    Pain: 2/10      Objective: See Flowsheet for details  1/31/2024: Vitals: BP L brachial 1) 86/65, pulse 104 with digital machine; 2) 101/70 with manual cuff  Pelvic alignment: R ant ilial rotation  B calf supple & pain-free      Assessment: Initial BP lower with digital machine, though with manual cuff more WNL. Pt was recommended to take BP at home as she states she has machine, with general education provided in ideal readings. Reviewed s/s requiring more urgent follow up with medical provider such as dizziness, lightheadedness etc. Pt was advised to inform OB at visit of BP reading tonight that was lower, & to see what her BP is tomorrow. Pt in agreement to plan. Pelvic symmetry obtained post-MET techniques with reduced pain. Recommended pt consider obtaining Serola/ SI belt for complementing MET techniques to provide external support for SIJ.       Goals: (to be met in 8 visits)  Patient will be able to perform Levator Ani/ Transverse Abdominis (Pelvic Brace) contraction with proper technique to prevent VALE at least 90% of the time with ADLs including cough.  Patient will report a reduction of urinary urgency with the ability to prevent UUI at least 90% of  the time while walking to the bathroom with use of urge deferment techniques.  Patient will exhibit an increase in B hip Abduction strength to 4/5 to allow for home & community ambulation including errands & medical appointments.  Patient will report a reduction of nocturia from 4x to 1x for improved sleep & ability to function daytime while working.  Patient will report pain at worst 4/10 with transfers in<>out of car & bed.  Patient understands importance of performing HEP to prevent reoccurrence of symptoms.     Plan: Continued goal of pain reduction. If pt obtains SI/ Serola belt bring into subsequent session to ensure proper fit  Date: 1/24/2024  TX#: 2/8 Date: 1/31/2024  TX#: 3/8 Date:                 TX#: 4/ Date:                 TX#: 5/ Date:   Tx#: 6/   Therex: 25'  Vitals check  Seated thoracic rotation 5x10\" ea  Standing at wall open book stretch x 5 ea  Seated figure-4 piriformis stretch 2x30\" ea   Concurrent with ex's: education re: DULCE, lift belly with sit<>stand  Manual Therapy: 15'  Standing thoracolumbar PSM & fascial release, STM/ MFR B QL & upper glutes. Use of emollient  Seated tableside with pillow T-spine CPA mobs gr I-III Therex: 23'  Vitals check with education (see above)  Seated figure-4 piriformis stretch 4x30\" ea  Seated HS stretch 4x30\" ea  Neuro Re-ed: 17'  Pelvic alignment check  MET correction techniques: to correct R ant ilial rotation & shotgun technique pubic symphysis (with HOB elevated 45 degrees)  SI/ Serola Belt: check [employer]. Bring into PT session for fit check  Education: standing posture, avoid \"hip pop\"        HEP: self shotgun MET pubic symphysis with breathing coordination (upon exhalation) to prevent coning & straining    Access Code: [email or web address]  URL: [email or web address]  Date: 01/16/2024  Prepared by: Kassandra Randhawa    Exercises  - Seated Hip Abduction with Resistance  - 1 x daily - 3-5 x weekly - 10 reps - 10 sec hold (Blue TheraBand)  - Seated Hip Adduction Squeeze  with Ball  - 1 x daily - 3-5 x weekly - 10 reps - 10 sec hold (or folded pillow, rolled blanket)    Charges: Therex x 2, Neuro x 1    Total Timed Treatment: 40 min  Total Treatment Time: 40 min

## 2024-02-01 ENCOUNTER — ROUTINE PRENATAL (OUTPATIENT)
Dept: OBGYN CLINIC | Facility: CLINIC | Age: 34
End: 2024-02-01
Payer: COMMERCIAL

## 2024-02-01 ENCOUNTER — OFFICE VISIT (OUTPATIENT)
Dept: PERINATAL CARE | Facility: HOSPITAL | Age: 34
End: 2024-02-01
Attending: OBSTETRICS & GYNECOLOGY
Payer: COMMERCIAL

## 2024-02-01 VITALS
DIASTOLIC BLOOD PRESSURE: 64 MMHG | HEART RATE: 96 BPM | SYSTOLIC BLOOD PRESSURE: 102 MMHG | BODY MASS INDEX: 28 KG/M2 | WEIGHT: 179 LBS

## 2024-02-01 VITALS
HEART RATE: 95 BPM | DIASTOLIC BLOOD PRESSURE: 67 MMHG | HEIGHT: 67 IN | WEIGHT: 180 LBS | BODY MASS INDEX: 28.25 KG/M2 | SYSTOLIC BLOOD PRESSURE: 103 MMHG

## 2024-02-01 DIAGNOSIS — R56.9 SEIZURE (HCC): ICD-10-CM

## 2024-02-01 DIAGNOSIS — O99.353 SEIZURE DISORDER DURING PREGNANCY IN THIRD TRIMESTER (HCC): Primary | ICD-10-CM

## 2024-02-01 DIAGNOSIS — Z3A.32 32 WEEKS GESTATION OF PREGNANCY: Primary | ICD-10-CM

## 2024-02-01 DIAGNOSIS — G40.909 SEIZURE DISORDER DURING PREGNANCY IN THIRD TRIMESTER (HCC): Primary | ICD-10-CM

## 2024-02-01 PROCEDURE — 76819 FETAL BIOPHYS PROFIL W/O NST: CPT

## 2024-02-01 PROCEDURE — 76816 OB US FOLLOW-UP PER FETUS: CPT | Performed by: OBSTETRICS & GYNECOLOGY

## 2024-02-01 PROCEDURE — 3078F DIAST BP <80 MM HG: CPT | Performed by: STUDENT IN AN ORGANIZED HEALTH CARE EDUCATION/TRAINING PROGRAM

## 2024-02-01 PROCEDURE — 3074F SYST BP LT 130 MM HG: CPT | Performed by: STUDENT IN AN ORGANIZED HEALTH CARE EDUCATION/TRAINING PROGRAM

## 2024-02-01 RX ORDER — LEVETIRACETAM 750 MG/1
TABLET, FILM COATED, EXTENDED RELEASE ORAL
COMMUNITY
Start: 2024-01-28

## 2024-02-01 NOTE — PATIENT INSTRUCTIONS
KICK COUNT INSTRUCTIONS    After 28 weeks of pregnancy, we would like for you to monitor your baby’s movement daily by doing kick counts, an easy way for you to assess your baby’s well-being.    How to count kicks:  Choose a time when the baby is active, such as after a meal.  Sit comfortably or lie on your side, and count the number of movements in an hour… you should feel 10 movements in 2 hours    The evening hours seem to be the most convenient time to do this test, although you can also do this test anytime you are concerned about the baby’s movement.    Call the office right away at [phone] if you notice any of the following:  Your baby moves less than 10 times in 2 hours while you are doing kick counts.  Your baby moves much less often than on the days before.  You have not felt your baby move all day.

## 2024-02-01 NOTE — PROGRESS NOTES
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              RTC in 2 wks      Note to patient and family   The  Century Cures Act makes medical notes available to patients in the interest of transparency.  However, please be advised that this is a medical document.  It is intended as vcoq-ln-wazz communication.  It is written and medical language may contain abbreviations or verbiage that are technical and unfamiliar.  It may appear blunt or direct.  Medical documents are intended to carry relevant information, facts as evident, and the clinical opinion of the practitioner.      Adi Freitas MD

## 2024-02-05 ENCOUNTER — LAB ENCOUNTER (OUTPATIENT)
Dept: LAB | Age: 34
End: 2024-02-05
Attending: Other
Payer: COMMERCIAL

## 2024-02-05 DIAGNOSIS — E55.9 VITAMIN D DEFICIENCY: ICD-10-CM

## 2024-02-05 DIAGNOSIS — Z34.90 ENCOUNTER FOR SUPERVISION OF NORMAL PREGNANCY, ANTEPARTUM, UNSPECIFIED GRAVIDITY: ICD-10-CM

## 2024-02-05 DIAGNOSIS — Z79.899 ENCOUNTER FOR LONG-TERM (CURRENT) USE OF HIGH-RISK MEDICATION: ICD-10-CM

## 2024-02-05 PROCEDURE — 87389 HIV-1 AG W/HIV-1&-2 AB AG IA: CPT

## 2024-02-05 PROCEDURE — 82306 VITAMIN D 25 HYDROXY: CPT

## 2024-02-05 PROCEDURE — 86780 TREPONEMA PALLIDUM: CPT

## 2024-02-05 PROCEDURE — 36415 COLL VENOUS BLD VENIPUNCTURE: CPT

## 2024-02-05 PROCEDURE — 80177 DRUG SCRN QUAN LEVETIRACETAM: CPT

## 2024-02-06 ENCOUNTER — TELEPHONE (OUTPATIENT)
Facility: CLINIC | Age: 34
End: 2024-02-06

## 2024-02-06 ENCOUNTER — HOSPITAL ENCOUNTER (OUTPATIENT)
Facility: HOSPITAL | Age: 34
Discharge: HOME | End: 2024-02-06
Attending: OBSTETRICS & GYNECOLOGY | Admitting: OBSTETRICS & GYNECOLOGY
Payer: COMMERCIAL

## 2024-02-06 ENCOUNTER — TELEPHONE (OUTPATIENT)
Dept: OBGYN CLINIC | Facility: CLINIC | Age: 34
End: 2024-02-06

## 2024-02-06 VITALS
BODY MASS INDEX: 28.09 KG/M2 | WEIGHT: 179 LBS | TEMPERATURE: 98 F | SYSTOLIC BLOOD PRESSURE: 111 MMHG | DIASTOLIC BLOOD PRESSURE: 59 MMHG | RESPIRATION RATE: 16 BRPM | HEIGHT: 67 IN | HEART RATE: 107 BPM

## 2024-02-06 DIAGNOSIS — M62.89 PELVIC FLOOR DYSFUNCTION: Primary | ICD-10-CM

## 2024-02-06 DIAGNOSIS — Z3A.32 32 WEEKS GESTATION OF PREGNANCY (HCC): ICD-10-CM

## 2024-02-06 DIAGNOSIS — N94.9 PAIN OF FEMALE SYMPHYSIS PUBIS: ICD-10-CM

## 2024-02-06 DIAGNOSIS — R10.2 PELVIC PAIN AFFECTING PREGNANCY IN THIRD TRIMESTER, ANTEPARTUM (HCC): ICD-10-CM

## 2024-02-06 DIAGNOSIS — O26.893 PELVIC PAIN AFFECTING PREGNANCY IN THIRD TRIMESTER, ANTEPARTUM (HCC): ICD-10-CM

## 2024-02-06 LAB
T PALLIDUM AB SER QL IA: NONREACTIVE
VIT D+METAB SERPL-MCNC: 27.1 NG/ML (ref 30–100)

## 2024-02-06 PROCEDURE — 59025 FETAL NON-STRESS TEST: CPT | Performed by: OBSTETRICS & GYNECOLOGY

## 2024-02-06 RX ORDER — ACETAMINOPHEN 500 MG
1000 TABLET ORAL ONCE
Status: DISCONTINUED | OUTPATIENT
Start: 2024-02-06 | End: 2024-02-06

## 2024-02-06 NOTE — NST
Nonstress Test   Patient: Shilpa Lopez    Gestation: 32w5d    NST:       Variability: Moderate           Accelerations: Yes           Decelerations: None            Baseline: 130 BPM           Uterine Irritability: No           Contractions: Not present                                        Acoustic Stimulator: No           Nonstress Test Interpretation: Reactive                                 Additional Comments

## 2024-02-06 NOTE — PROGRESS NOTES
Discharge instructions reviewed with patient and spouse at bedside. Verbal & written education provided and patient understands importance of keeping follow-up appointments. Next outpatient Physical Therapy appointment scheduled for tomorrow, next OB Appointment next Thursday. All questions/concerns addressed and clinic contact information provided.     Gillian BUENROSTRO RN 2/6/2024 10:27 AM

## 2024-02-06 NOTE — NST
NST    2/6/2024   32w5d     Pubic bone pain, pelvic floor pain     bpm, mod ricki, +accels, decels   [place] rare contraction     Reactive NST  Will order pelvic floor PT    Yolis Gamboa MD

## 2024-02-06 NOTE — DISCHARGE INSTRUCTIONS
Discharge Instructions    Diet: Regular  Activity: Normal activity  Restrictions: No exercising;No lifting;Minimize stair-climbing      General Instructions    Call your OB doctor if: Fluid leaking from your vagina;Vaginal bleeding;Uterine contractions 10 minutes or closer for 1 to 2 hours;Temperature greater than 100F;Uterine contractions increasing in intensity and frequency;Vaginal or rectal pressure;Decrease in fetal movement

## 2024-02-06 NOTE — TELEPHONE ENCOUNTER
GA 32w5d patient complaining of extreme and sudden pelvic pressure and pain. 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  Last OV: 2024   Pregnancy Complications: seizure disorder, ankylosing spondylitis of lumbosacral region, asthma, interstitial cystitis  Leaking of fluid: no  Vaginal Bleeding: no  Fetal Movement: some fetal movements but seems decreased  Recommendations: Advised patient to go to L&D now at [facility] for evaluation of extreme pressure and pain and decreased fetal movements. Do not drive yourself- have someone take you. Keep physical therapy appointment tomorrow. Patient verbalized understanding and agrees with plan.

## 2024-02-06 NOTE — PROGRESS NOTES
Pt is a 33 year old female admitted to TRG4/TRG4-A.     Chief Complaint   Patient presents with     Assessment     Exacerbation of chronic pubic symphysis pain that started ~0700am, now describing constant pressure that is worse with standing/moving and radiates bilaterally across pelvic region. Pain 10/10, tearful. Denies taking anything for the pain today, in the past has taken Tylenol with little relief of pain. Denies abdominal cramping/contractions, vaginal bleeding/LOF. +FM.       Pt is  32w5d intra-uterine pregnancy.  History obtained, consents signed. Oriented to room, staff, and plan of care.     EFM tested and applied. Abdomen soft and non-tender. Active fetal movement per patient report.

## 2024-02-06 NOTE — TELEPHONE ENCOUNTER
OB Patient called crying and she says she is in a lot of pain and pressure on her pelvic area. Please advise

## 2024-02-07 ENCOUNTER — OFFICE VISIT (OUTPATIENT)
Dept: PHYSICAL THERAPY | Age: 34
End: 2024-02-07
Attending: OBSTETRICS & GYNECOLOGY
Payer: COMMERCIAL

## 2024-02-07 PROCEDURE — 97112 NEUROMUSCULAR REEDUCATION: CPT

## 2024-02-07 PROCEDURE — 97110 THERAPEUTIC EXERCISES: CPT

## 2024-02-07 NOTE — PROGRESS NOTES
Diagnosis:   Supervision of normal first pregnancy, antepartum (Z34.00)  Pubic bone pain (M89.9)  Pelvic pain affecting pregnancy in third trimester, antepartum (O26.893,R10.2)      Referring Provider: Frankie  Date of Evaluation:   1/16/2024    Precautions:  Drug Allergy  Bring inhaler to PT (asthma)  Seizures - controlled    Currently Pregnant: 32w6d GUANAKO 3/28/24 Next MD visit:   2/15/2024  Date of Surgery: n/a   Insurance Primary/Secondary: [employer] [place] H[place] / N/A     # Auth Visits: 8v 1/1 to 4/15            Subjective: Pt reports yesterday feeling significant pain & pressure similarly to before but much more intense. Noted with getting out of car. Went to L&D yesterday as advised. When at home resting she is doing okay, better than yesterday. Was told to try to avoid lifting & limit stairs for 2 days. Pt wonders if being on her feet a lot at baby shower, going up/ down stairs, driving 's car SUV needing to \"climb up\" could have overdid it. Brought in SI belt she purchased from [employer].    Pain: 4/10 sitting. 5-6/10 walking. Pain reported starting in front wrapping around to tailbone & R inguinal      Objective: See Flowsheet for details  2/7/2024: Increased antalgic gait pattern with pregnancy waddle. Pelvic alignment: R ant ilial rotation      Assessment: Correction of pelvic obliquity with MET techniques. Pt was instructed in proper wear of SI belt. Pt reported feeling that the SI belt reduced her pain & supported her low back. Pt did have mild reduction of antalgic gait during ambulation in clinic with SI belt.      Goals: (to be met in 8 visits)  Patient will be able to perform Levator Ani/ Transverse Abdominis (Pelvic Brace) contraction with proper technique to prevent VALE at least 90% of the time with ADLs including cough.  Patient will report a reduction of urinary urgency with the ability to prevent UUI at least 90% of the time while walking to the bathroom with use of urge deferment techniques.  Patient  will exhibit an increase in B hip Abduction strength to 4/5 to allow for home & community ambulation including errands & medical appointments.  Patient will report a reduction of nocturia from 4x to 1x for improved sleep & ability to function daytime while working.  Patient will report pain at worst 4/10 with transfers in<>out of car & bed.  Patient understands importance of performing HEP to prevent reoccurrence of symptoms.     Plan: Continued goal of pain reduction, mobility, & strengthening as pt continues through pregnancy.  Date: 1/24/2024  TX#: 2/8 Date: 1/31/2024  TX#: 3/8 Date: 2/7/2024  TX#: 4/8 Date:                 TX#: 5/ Date:   Tx#: 6/   Therex: 25'  Vitals check  Seated thoracic rotation 5x10\" ea  Standing at wall open book stretch x 5 ea  Seated figure-4 piriformis stretch 2x30\" ea   Concurrent with ex's: education re: DULCE, lift belly with sit<>stand  Manual Therapy: 15'  Standing thoracolumbar PSM & fascial release, STM/ MFR B QL & upper glutes. Use of emollient  Seated tableside with pillow T-spine CPA mobs gr I-III Therex: 23'  Vitals check with education (see above)  Seated figure-4 piriformis stretch 4x30\" ea  Seated HS stretch 4x30\" ea  Neuro Re-ed: 17'  Pelvic alignment check  MET correction techniques: to correct R ant ilial rotation & shotgun technique pubic symphysis (with HOB elevated 45 degrees)  SI/ Serola Belt: check [employer]. Bring into PT session for fit check  Education: standing posture, avoid \"hip pop\"   Neuro Re-ed: 27'  Pelvic alignment check  MET correction techniques: to correct R ant ilial rotation & shotgun technique pubic symphysis (with HOB elevated 45 degrees)  General DULCE education: Exhale with exertion, sit to stand with breathing  SI belt fit/ adjustment with instruction in proper wear. Remove in supine & for sleeping, loosen or remove in sitting. Remove if increased pain, N/T or any s/s fetal distress (do not expect)  Therex: 15'  Flat back stretch 3x30\" @ // bars  Hip  Add stretch 2x30\" ea @ // bars  NuStep L2 x 6' with BUE     HEP: self shotgun MET pubic symphysis with breathing coordination (upon exhalation) to prevent coning & straining    Access Code: [email or web address]  URL: [email or web address]  Date: 01/16/2024  Prepared by: Kassandra Randhawa    Exercises  - Seated Hip Abduction with Resistance  - 1 x daily - 3-5 x weekly - 10 reps - 10 sec hold (Blue TheraBand)  - Seated Hip Adduction Squeeze with Ball  - 1 x daily - 3-5 x weekly - 10 reps - 10 sec hold (or folded pillow, rolled blanket)    Charges: Therex x 1, Neuro x 2    Total Timed Treatment: 42 min  Total Treatment Time: 42 min

## 2024-02-08 LAB — LEVETIRACETAM LVL: 11 UG/ML

## 2024-02-13 ENCOUNTER — OFFICE VISIT (OUTPATIENT)
Dept: FAMILY MEDICINE CLINIC | Facility: CLINIC | Age: 34
End: 2024-02-13
Payer: COMMERCIAL

## 2024-02-13 ENCOUNTER — TELEPHONE (OUTPATIENT)
Dept: OBGYN CLINIC | Facility: CLINIC | Age: 34
End: 2024-02-13

## 2024-02-13 VITALS
DIASTOLIC BLOOD PRESSURE: 54 MMHG | RESPIRATION RATE: 20 BRPM | HEART RATE: 110 BPM | HEIGHT: 67 IN | SYSTOLIC BLOOD PRESSURE: 110 MMHG | BODY MASS INDEX: 28.6 KG/M2 | TEMPERATURE: 99 F | OXYGEN SATURATION: 97 % | WEIGHT: 182.19 LBS

## 2024-02-13 DIAGNOSIS — J40 BRONCHITIS WITH ACUTE WHEEZING: Primary | ICD-10-CM

## 2024-02-13 DIAGNOSIS — Z3A.34 34 WEEKS GESTATION OF PREGNANCY: ICD-10-CM

## 2024-02-13 DIAGNOSIS — R05.1 ACUTE COUGH: ICD-10-CM

## 2024-02-13 PROCEDURE — 3074F SYST BP LT 130 MM HG: CPT | Performed by: FAMILY MEDICINE

## 2024-02-13 PROCEDURE — 3008F BODY MASS INDEX DOCD: CPT | Performed by: FAMILY MEDICINE

## 2024-02-13 PROCEDURE — 99214 OFFICE O/P EST MOD 30 MIN: CPT | Performed by: FAMILY MEDICINE

## 2024-02-13 PROCEDURE — 3078F DIAST BP <80 MM HG: CPT | Performed by: FAMILY MEDICINE

## 2024-02-13 RX ORDER — AMOXICILLIN 500 MG/1
500 CAPSULE ORAL 3 TIMES DAILY
Qty: 30 CAPSULE | Refills: 0 | Status: SHIPPED | OUTPATIENT
Start: 2024-02-13 | End: 2024-02-23

## 2024-02-13 RX ORDER — PREDNISONE 20 MG/1
20 TABLET ORAL 2 TIMES DAILY
Qty: 10 TABLET | Refills: 0 | Status: SHIPPED | OUTPATIENT
Start: 2024-02-13 | End: 2024-02-18

## 2024-02-13 NOTE — TELEPHONE ENCOUNTER
Left vm for pt. that Dr. Freitas would like to re-schedule her appointment. For Thurs 2/15/24 for next week, once she is feeling better..Told her in vm that PSR will call her to re-schedule appointment.  And to call our office with any questions or concerns         ATTENTION PSR.....    Please call pt, and reschedule her appointment.that she had for this Thurs 2/15/24 @ 3 30 pm with  to next week per .  I had left a vm for pt. to expect your call for re-scheduling.  Thanks

## 2024-02-13 NOTE — PROGRESS NOTES
CHIEF COMPLAINT:     Chief Complaint   Patient presents with    Cough         HPI:   Shilpa Lopez is a 33 year old female who presents for evaluation of a cough.  Onset was 1 week ago. The cough has been getting worse. Treatments tried OTC cough medication, tylenol.  Reports minor fever, chills, sputum production, wheezing. Denies lung disease, asthma, copd, weight loss,  smoking, heart disease. Sick contacts: coworkers and kids. Recent travel: Denies Exposure to TB: Denies. Associated symptoms include: congestion, green colored nasal discharge, low grade fever, cough with green colored sputum, cough is not keeping pt up at night, wheezing, prior history of bronchitis    Current Outpatient Medications   Medication Sig Dispense Refill    amoxicillin 500 MG Oral Cap Take 1 capsule (500 mg total) by mouth 3 (three) times daily for 10 days. 30 capsule 0    predniSONE 20 MG Oral Tab Take 1 tablet (20 mg total) by mouth 2 (two) times daily for 5 days. 10 tablet 0    levetiracetam 1000 MG Oral Tab Take 2.5 tablets (2,500 mg total) by mouth daily.      Prenatal Vit w/Pg-Ffmijpexn-PP (PNV OR) Take by mouth daily.      folic acid 1 MG Oral Tab Take 4 tablets (4 mg total) by mouth daily.      Cholecalciferol (VITAMIN D) 1000 units Oral Tab Take 1,000 tablets by mouth daily.      levETIRAcetam  MG Oral Tablet 24 Hr  (Patient not taking: Reported on 2/13/2024)        Past Medical History:   Diagnosis Date    Ankylosing spondylitis (HCC)     Asthma     Extrinsic asthma, unspecified     sports induced    GERD (gastroesophageal reflux disease)     Hematuria     weird presentation of ibs    History of gross hematuria 2012    History of recurrent UTIs 2012    Interstitial cystitis     Irritable bowel     Knee pain     MRSA infection 02/2012    Eye    Other and unspecified ovarian cysts     Pain in both knees, unspecified chronicity     Pelvic inflammatory disease     PONV (postoperative nausea and vomiting)     nausea     Right ankle pain, unspecified chronicity     Right rotator cuff tendonitis     Seizure disorder (HCC) 02/2016    Sees Dr. 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[employer], commutes, 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[employer]    Vaping Use    Vaping Use: Never used   Substance and Sexual Activity    Alcohol use: Yes     Alcohol/week: 0.0 standard drinks of alcohol     Comment: 2 a day when healthy    Drug use: No     Comment: Pt has medical card for use    Sexual activity: Yes     Partners: Male   Other Topics Concern    Caffeine Concern Yes     Comment: 1/2 coffee a couple times a week    Exercise No     Social Determinants of Health     Financial Resource Strain: Low Risk  (2/6/2024)    Financial Resource Strain     Difficulty of Paying Living Expenses: Not hard at all     Med Affordability: No   Food Insecurity: No Food Insecurity (2/6/2024)    Food Insecurity     Food Insecurity: Never true   Transportation Needs: No Transportation Needs (2/6/2024)    Transportation Needs     Lack of Transportation: No   Stress: No Stress Concern Present (2/6/2024)    Stress     Feeling of Stress : No   Housing Stability: Low Risk  (2/6/2024)    Housing Stability     Housing Instability: No        REVIEW OF SYSTEMS:   GENERAL: see HPI  SKIN: No rashes, or other skin lesions.   EYES: Denies blurred vision or double vision.  HENT:  See HPI  CARDIOVASCULAR: Denies palpitations  LUNGS: Per HPI.   GI: Denies N/V/C/D or abdominal pain.      EXAM:   /54 (BP Location: Right arm, Patient Position: Sitting, Cuff Size: adult)   Pulse 110   Temp 99.1 °F (37.3 °C) (Oral)   Resp 20   Ht 5' 7\" (1.702 m)   Wt 182 lb 3.2 oz (82.6 kg)   LMP  06/22/2023   SpO2 97%   BMI 28.54 kg/m²   GENERAL: well developed, well nourished,in no apparent distress  SKIN: no rashes, no suspicious lesions  EYES: Conjunctiva clear.  No scleral icterus.  HENT: Atraumatic, normocephalic.  TM's clear bilaterally.  Nostrils patent, nasal mucosa pink and non-inflamed.  No erythema of the throat.  NECK: supple, non-tender.  LUNGS: Normal respiratory rate. Normal effort.  + cough. + wheezing. No rales or crackles. No decreased BS.   CARDIO: RRR without murmur  LYMPH: No cervical or supraclavicular lymphadenopathy.   EXTREMITIES:  No clubbing, cyanosis, or edema.    ASSESSMENT AND PLAN:   Shilpa Lopez is a 33 year old female who presents with:     ASSESSMENT:  No diagnosis found.    PLAN:  Meds as below.  Comfort Care as listed in Patient Instructions.      Meds & Refills for this Visit:  Requested Prescriptions     Signed Prescriptions Disp Refills    amoxicillin 500 MG Oral Cap 30 capsule 0     Sig: Take 1 capsule (500 mg total) by mouth 3 (three) times daily for 10 days.    predniSONE 20 MG Oral Tab 10 tablet 0     Sig: Take 1 tablet (20 mg total) by mouth 2 (two) times daily for 5 days.     Side effects, risks, benefits, of medication explained and discussed.      Recommended increased fluids/humidity    Steam inhalation for sinus pressure   OTC cold and flu medication as needed, that are cleared by your OB/GYN  Robitussin as needed for cough  1 tsp honey for the cough prn    Tylenol as needed    Recheck if not improved in one week or sooner if worsening.    The patient indicates understanding of these issues and agrees to the plan.  The patient is asked to see PCP if sx's persist for more than 2 weeks, sooner if worsen.    There are no Patient Instructions on file for this visit.

## 2024-02-13 NOTE — TELEPHONE ENCOUNTER
GA 33 weeks 5 days patient complaining of cold symptoms since last Fri 2024  Last OV: 2024 Zena Hughes      Abdominal pain: sore abdomen from coughing, but no contractions noted   Leaking of fluid: no  Vaginal Bleeding: no  Fetal Movement: +    Called and spoke with pt .and she started getting sick last Friday with cold symptoms, which include..producitve cough(yellow mucous), chest congestion, on/off chills/wheezing/ SOB..  Pt. went to see Juno TAYLOR from [facility]. this am Tu. 2024..refer to his ov note.(Was not finished at time of this charting)  Pt's BP good this am 110/54 and O2 sat 97%, temp 99.1.  She was rx'ed Amoxicillin 500 mg TID x 10 days and Prednisone 20 mg BID x 5 days.  Pt. tested herself at home and was negative for COVID, says Juno did not send her for further testing, because he would have treated her with same medication even if she did end up testing poitive for other virus'  Told pt. To call with any questions or further concerns.  She verbalizes understanding..    Pt. has appointment .coming up with you Thurs. 2/15/2024, and is wondering if she should come in for appointment for NAT..    Please advise/recommend..  I told her see how she feels after theses next couple days..

## 2024-02-13 NOTE — TELEPHONE ENCOUNTER
Pt called stating she has been experiencing a really bad cough for the last 5 days now with some wheezing as well. Pt stated she was prescribed amoxicillin and steroids. Pt definitely sounded out of breath. Pt was advised by her PCP to let her OBGYN know what was going on. Pt is also scheduled on the 15th   Future Appointments   Date Time Provider Department Center   2/14/2024  5:15 PM Kassandra Randhawa, PT PF PT [place]   2/15/2024  3:30 PM Adi Freitas MD [facility] [address]   2/21/2024  5:15 PM Kassandra Randhawa, PT PF [facility] [place]   2/26/2024  3:15 PM Baltazar Alvarez MD [facility] [place]   2/28/2024  5:15 PM Kassandra Randhawa, PT [facility] [place]   3/5/2024  3:00 PM Thaddues Monge MD [facility] [place]   3/6/2024  5:15 PM Kassandra Randhawa, PT PF [facility] [place]   3/7/2024  3:30 PM Gabbi Pike APN [facility] [address]   3/12/2024  3:30 PM Baltazar Alvarez MD [facility] [address]   3/13/2024  5:15 PM Kassandra Randhawa, PT PF PT [place]   3/20/2024  5:15 PM Kassandra Randhawa, PT PF PT [place]   3/21/2024  3:30 PM Gabbi Pike APN [facility] [address]   3/27/2024  1:15 PM Shell Rodirguez MD [facility] [address]     Is It ok to keep her appointment? Please advise.

## 2024-02-14 ENCOUNTER — APPOINTMENT (OUTPATIENT)
Dept: PHYSICAL THERAPY | Age: 34
End: 2024-02-14
Attending: OBSTETRICS & GYNECOLOGY
Payer: COMMERCIAL

## 2024-02-14 ENCOUNTER — TELEPHONE (OUTPATIENT)
Dept: FAMILY MEDICINE CLINIC | Facility: CLINIC | Age: 34
End: 2024-02-14

## 2024-02-14 NOTE — TELEPHONE ENCOUNTER
Saw Juno yesterday. Has a new symptom-when she is coughing it is now productive and blood in the cough as well. Please advise.

## 2024-02-19 ENCOUNTER — ROUTINE PRENATAL (OUTPATIENT)
Dept: OBGYN CLINIC | Facility: CLINIC | Age: 34
End: 2024-02-19
Payer: COMMERCIAL

## 2024-02-19 VITALS
WEIGHT: 181.81 LBS | SYSTOLIC BLOOD PRESSURE: 100 MMHG | DIASTOLIC BLOOD PRESSURE: 60 MMHG | HEIGHT: 67 IN | BODY MASS INDEX: 28.54 KG/M2

## 2024-02-19 DIAGNOSIS — Z34.00 SUPERVISION OF NORMAL FIRST PREGNANCY, ANTEPARTUM: Primary | ICD-10-CM

## 2024-02-19 NOTE — PROGRESS NOTES
NAT  Doing well, +FM  Denies VB/LOF/uctx  Mode of delivery:  anticipated  Labor precautions discussed  She had borderline Vitamin D and is taking Vit D3 2000 daily- advised this is OK  RTC 1 -2 weeks with NAT

## 2024-02-21 ENCOUNTER — OFFICE VISIT (OUTPATIENT)
Dept: PHYSICAL THERAPY | Age: 34
End: 2024-02-21
Attending: OBSTETRICS & GYNECOLOGY
Payer: COMMERCIAL

## 2024-02-21 PROCEDURE — 97110 THERAPEUTIC EXERCISES: CPT

## 2024-02-21 NOTE — PROGRESS NOTES
Diagnosis:   Supervision of normal first pregnancy, antepartum (Z34.00)  Pubic bone pain (M89.9)  Pelvic pain affecting pregnancy in third trimester, antepartum (O26.893,R10.2)      Referring Provider: Frankie  Date of Evaluation:   1/16/2024    Precautions:  Drug Allergy  Bring inhaler to PT (asthma)  Seizures - controlled    Currently Pregnant: 34w6d  GUANAKO 3/28/24 Next MD visit:   2/26/2024  Date of Surgery: n/a   Insurance Primary/Secondary: [place] H[place] / N/A     # Auth Visits: 8v 1/1 to 4/15            Subjective: Pt reports not the worst, not the best. Better when she could rest but then with resuming ADLs - more challenging. Still has lingering cough. SI belt has been really helpful but has to take off with sitting. OB this past Monday- WNL. Feels baby kicking/ pressure more on bladder- not sure if different?    Pain: 3/10 pubic symphysis & LBP      Objective: See Flowsheet for details  2/21/2024: Pelvic alignment: symmetrical. B calf supple & pain-free. Reduced antalgic gait pattern.  2/7/2024: Increased antalgic gait pattern with pregnancy waddle. Pelvic alignment: R ant ilial rotation      Assessment: Reduced antalgia in gait may be associated with pelvic symmetry, possibly contributed from increased wear of SI belt. Earlier fatigue to RLE with clams.      Goals: (to be met in 8 visits)  Patient will be able to perform Levator Ani/ Transverse Abdominis (Pelvic Brace) contraction with proper technique to prevent VALE at least 90% of the time with ADLs including cough.  Patient will report a reduction of urinary urgency with the ability to prevent UUI at least 90% of the time while walking to the bathroom with use of urge deferment techniques.  Patient will exhibit an increase in B hip Abduction strength to 4/5 to allow for home & community ambulation including errands & medical appointments.  Patient will report a reduction of nocturia from 4x to 1x for improved sleep & ability to function daytime while  working.  Patient will report pain at worst 4/10 with transfers in<>out of car & bed.  Patient understands importance of performing HEP to prevent reoccurrence of symptoms.     Plan: Continued goal of pain reduction, mobility, & strengthening as pt continues through pregnancy. Pt is aware PT will be out of office from 2/22 & returning 2/26 & will contact central scheduling if any issues arise during this time. Next appointment confirmed.  Date: 1/24/2024  TX#: 2/8 Date: 1/31/2024  TX#: 3/8 Date: 2/7/2024  TX#: 4/8 Date: 2/21/2024  TX#: 5/8 Date:   Tx#: 6/   Therex: 25'  Vitals check  Seated thoracic rotation 5x10\" ea  Standing at wall open book stretch x 5 ea  Seated figure-4 piriformis stretch 2x30\" ea   Concurrent with ex's: education re: DULCE, lift belly with sit<>stand  Manual Therapy: 15'  Standing thoracolumbar PSM & fascial release, STM/ MFR B QL & upper glutes. Use of emollient  Seated tableside with pillow T-spine CPA mobs gr I-III Therex: 23'  Vitals check with education (see above)  Seated figure-4 piriformis stretch 4x30\" ea  Seated HS stretch 4x30\" ea  Neuro Re-ed: 17'  Pelvic alignment check  MET correction techniques: to correct R ant ilial rotation & shotgun technique pubic symphysis (with HOB elevated 45 degrees)  SI/ Serola Belt: check [employer]. Bring into PT session for fit check  Education: standing posture, avoid \"hip pop\"   Neuro Re-ed: 27'  Pelvic alignment check  MET correction techniques: to correct R ant ilial rotation & shotgun technique pubic symphysis (with HOB elevated 45 degrees)  General DULCE education: Exhale with exertion, sit to stand with breathing  SI belt fit/ adjustment with instruction in proper wear. Remove in supine & for sleeping, loosen or remove in sitting. Remove if increased pain, N/T or any s/s fetal distress (do not expect)  Therex: 15'  Flat back stretch 3x30\" @ // bars  Hip Add stretch 2x30\" ea @ // bars  NuStep L2 x 6' with BUE Therex: 41'  Education: Follow up with OB  if pt has any definitive/ suspicious kick count changes/ concerns (do not wait until Mon if noted)  NuStep L2 x 8' with BUE  Slantboard 2-way calf stretch 3x30\" ea B  Flat back stretch 3x30\" @ // bars  Hip Add stretch 3x30\" ea @ // bars  Hooklying hip Abd & Add Anuja with green pilates ring 5\" hold, 2x10 ea  Clams 3x10 LLE, 2x10 RLE  Neuro Re-ed: 2'  Pelvic alignment check      HEP: self shotgun MET pubic symphysis with breathing coordination (upon exhalation) to prevent coning & straining    Access Code: [email or web address]  URL: [email or web address]  Date: 01/16/2024  Prepared by: Kassandra Randhawa    Exercises  - Seated Hip Abduction with Resistance  - 1 x daily - 3-5 x weekly - 10 reps - 10 sec hold (Blue TheraBand)  - Seated Hip Adduction Squeeze with Ball  - 1 x daily - 3-5 x weekly - 10 reps - 10 sec hold (or folded pillow, rolled blanket)    Charges: Therex x 3    Total Timed Treatment: 43 min  Total Treatment Time: 43 min

## 2024-02-22 ENCOUNTER — TELEPHONE (OUTPATIENT)
Dept: OBGYN CLINIC | Facility: CLINIC | Age: 34
End: 2024-02-22

## 2024-02-25 PROBLEM — Z3A.32 32 WEEKS GESTATION OF PREGNANCY (HCC): Status: RESOLVED | Noted: 2024-02-06 | Resolved: 2024-02-25

## 2024-02-26 ENCOUNTER — TELEPHONE (OUTPATIENT)
Dept: OBGYN CLINIC | Facility: CLINIC | Age: 34
End: 2024-02-26

## 2024-02-26 ENCOUNTER — ROUTINE PRENATAL (OUTPATIENT)
Dept: OBGYN CLINIC | Facility: CLINIC | Age: 34
End: 2024-02-26
Payer: COMMERCIAL

## 2024-02-26 VITALS
HEART RATE: 105 BPM | WEIGHT: 182.81 LBS | DIASTOLIC BLOOD PRESSURE: 62 MMHG | SYSTOLIC BLOOD PRESSURE: 98 MMHG | BODY MASS INDEX: 29 KG/M2

## 2024-02-26 DIAGNOSIS — Z34.90 PRENATAL CARE, ANTEPARTUM (HCC): Primary | ICD-10-CM

## 2024-02-26 NOTE — TELEPHONE ENCOUNTER
Hx:  GA: 35w4d    Cramping for the last two days; was seen in the office today.   After voiding reports pink spotting x1 with wiping. Denies leakage of fluid, period like bleeding or severe pain. Reports good fetal movement.    Patient advised to monitor for now. Pain, bleeding, decreased fetal movement precautions discussed. Information about false labor vs true labor sen via MyChart. Patient aware of when to follow-up with L&D. No further questions.

## 2024-02-26 NOTE — TELEPHONE ENCOUNTER
OB pt had an OV today and she feels some cramping at the moment. When she went to the bathroom, like 5 min ago she noticed some pink color on the toilet paper after wiping. Please advise

## 2024-02-26 NOTE — PROGRESS NOTES
Chief Complaint   Patient presents with    Prenatal Care     NAT      Routine prenatal visit without complaints.  Patient denies any bleeding, leaking fluid, cramping, or regular uterine contractions. Good fetal movement.  Assessment/Plan:  35w4d doing well  GBS next  Kick counts reminded  Reviewed  labor signs and symptoms.  Reviewed vaccine recommendations:  Tdap done.  Diagnoses and all orders for this visit:    Prenatal care, antepartum (HCC)       Return in about 1 week (around 3/4/2024) for Routine Prenatal Visit, LADI.

## 2024-02-28 ENCOUNTER — OFFICE VISIT (OUTPATIENT)
Dept: PHYSICAL THERAPY | Age: 34
End: 2024-02-28
Attending: OBSTETRICS & GYNECOLOGY
Payer: COMMERCIAL

## 2024-02-28 PROCEDURE — 97140 MANUAL THERAPY 1/> REGIONS: CPT

## 2024-02-28 PROCEDURE — 97110 THERAPEUTIC EXERCISES: CPT

## 2024-02-28 NOTE — PROGRESS NOTES
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 Does feel better with brace but did not wear on Monday as left in car; yesterday worked 12 hours. Has not had any more pink tinge with wiping; was told per OB that not concerning unless having more. BP has been good & no increased LE swelling.    Pain: sitting 2/10, walking 5/10      Objective: See Flowsheet for details  2/28/2024: Pelvic alignment: symmetrical. B calf supple & pain-free with B pedal pulses intact. Increased antalgic gait pattern. TTP with increased restriction L>R Adductors.      Assessment: Increased challenge with bed mobility likely 2/2 pain. Great tolerance to manual treatment (STM) to address STRs to Adductors. No increased symptoms with pelvic mobility ex's on Swiss Ball.      Goals: (to be met in 8 visits)  Patient will be able to perform Levator Ani/ Transverse Abdominis (Pelvic Brace) contraction with proper technique to prevent VALE at least 90% of the time with ADLs including cough.  Patient will report a reduction of urinary urgency with the ability to prevent UUI at least 90% of the time while walking to the bathroom with use of urge deferment techniques.  Patient will exhibit an increase in B hip Abduction strength to 4/5 to allow for home & community ambulation including errands & medical appointments.  Patient will report a reduction of nocturia from 4x to 1x for improved sleep & ability to function daytime while  working.  Patient will report pain at worst 4/10 with transfers in<>out of car & bed.  Patient understands importance of performing HEP to prevent reoccurrence of symptoms.     Plan: Continued goal of pain reduction, mobility, & strengthening as pt continues through pregnancy.  Date: 1/24/2024  TX#: 2/8 Date: 1/31/2024  TX#: 3/8 Date: 2/7/2024  TX#: 4/8 Date: 2/21/2024  TX#: 5/8 Date: 2/28/2024  Tx#: 6/8   Therex: 25'  Vitals check  Seated thoracic rotation 5x10\" ea  Standing at wall open book stretch x 5 ea  Seated figure-4 piriformis stretch 2x30\" ea   Concurrent with ex's: education re: DULCE, lift belly with sit<>stand  Manual Therapy: 15'  Standing thoracolumbar PSM & fascial release, STM/ MFR B QL & upper glutes. Use of emollient  Seated tableside with pillow T-spine CPA mobs gr I-III Therex: 23'  Vitals check with education (see above)  Seated figure-4 piriformis stretch 4x30\" ea  Seated HS stretch 4x30\" ea  Neuro Re-ed: 17'  Pelvic alignment check  MET correction techniques: to correct R ant ilial rotation & shotgun technique pubic symphysis (with HOB elevated 45 degrees)  SI/ Serola Belt: check [employer]. Bring into PT session for fit check  Education: standing posture, avoid \"hip pop\"   Neuro Re-ed: 27'  Pelvic alignment check  MET correction techniques: to correct R ant ilial rotation & shotgun technique pubic symphysis (with HOB elevated 45 degrees)  General DULCE education: Exhale with exertion, sit to stand with breathing  SI belt fit/ adjustment with instruction in proper wear. Remove in supine & for sleeping, loosen or remove in sitting. Remove if increased pain, N/T or any s/s fetal distress (do not expect)  Therex: 15'  Flat back stretch 3x30\" @ // bars  Hip Add stretch 2x30\" ea @ // bars  NuStep L2 x 6' with BUE Therex: 41'  Education: Follow up with OB if pt has any definitive/ suspicious kick count changes/ concerns (do not wait until Mon if noted)  NuStep L2 x 8' with BUE  Slantboard 2-way calf  stretch 3x30\" ea B  Flat back stretch 3x30\" @ // bars  Hip Add stretch 3x30\" ea @ // bars  Hooklying hip Abd & Add Anuja with green pilates ring 5\" hold, 2x10 ea  Clams 3x10 LLE, 2x10 RLE  Neuro Re-ed: 2'  Pelvic alignment check   Manual Therapy: 33'  STM R/L Adductors over clothing in R/L sidelying with folded pillow support to abdomen & upper leg; HOB elevated 30 degrees  Neuro Re-ed: 2'  Pelvic alignment check  Therex: 8'  Seated APT<>PPT, lat pelvic tilt, CW/ CCW circles on Swiss Ball x 20 ea with HHA tableside     HEP: self shotgun MET pubic symphysis with breathing coordination (upon exhalation) to prevent coning & straining    Access Code: [email or web address]  URL: [email or web address]  Date: 01/16/2024  Prepared by: Kassandra Randhawa    Exercises  - Seated Hip Abduction with Resistance  - 1 x daily - 3-5 x weekly - 10 reps - 10 sec hold (Blue TheraBand)  - Seated Hip Adduction Squeeze with Ball  - 1 x daily - 3-5 x weekly - 10 reps - 10 sec hold (or folded pillow, rolled blanket)    Charges: Manual x 2, Therex x 1    Total Timed Treatment: 43 min  Total Treatment Time: 43 min

## 2024-02-28 NOTE — TELEPHONE ENCOUNTER
Patient saw OB   Type Of Destruction Used: Curettage Biopsy Method: Personna blade Render Post-Care Instructions In Note?: yes Hemostasis: Rolf's Cryotherapy Text: The wound bed was treated with cryotherapy after the biopsy was performed. Lab: [id number] Anesthesia Volume In Cc: 1 Consent: Written consent was obtained and risks were reviewed including but not limited to scarring, infection, bleeding, scabbing, incomplete removal, nerve damage and allergy to anesthesia. Post-Care Instructions: I reviewed with the patient in detail post-care instructions. Patient is to keep the biopsy site dry overnight, and then apply bacitracin twice daily until healed. Patient may apply hydrogen peroxide soaks to remove any crusting. Notification Instructions: Patient will be notified of biopsy results. However, patient instructed to call the office if not contacted within 2 weeks. Electrodesiccation And Curettage Text: The wound bed was treated with electrodesiccation and curettage after the biopsy was performed. Additional Anesthesia Volume In Cc (Will Not Render If 0): 0 Lab Facility: [id number] Bill [id number] For Specimen Handling/Conveyance To Laboratory?: no Dressing: bandage Curettage Text: The wound bed was treated with curettage after the biopsy was performed. Biopsy Type: H and E Silver Nitrate Text: The wound bed was treated with silver nitrate after the biopsy was performed. Wound Care: Vaseline Detail Level: Detailed Electrodesiccation Text: The wound bed was treated with electrodesiccation after the biopsy was performed. Billing Type: Third-Party Bill Anesthesia Type: 1% lidocaine with epinephrine

## 2024-03-02 NOTE — TELEPHONE ENCOUNTER
FMLA and QUINN/FCR received. Forms were not scanned double sided. Will await originals to arrive to [facility]

## 2024-03-05 ENCOUNTER — OFFICE VISIT (OUTPATIENT)
Dept: NEUROLOGY | Facility: CLINIC | Age: 34
End: 2024-03-05
Payer: COMMERCIAL

## 2024-03-05 VITALS
SYSTOLIC BLOOD PRESSURE: 100 MMHG | WEIGHT: 186.63 LBS | RESPIRATION RATE: 16 BRPM | BODY MASS INDEX: 29 KG/M2 | HEART RATE: 105 BPM | DIASTOLIC BLOOD PRESSURE: 62 MMHG

## 2024-03-05 DIAGNOSIS — R56.9 CONVULSIONS, UNSPECIFIED CONVULSION TYPE (HCC): Primary | ICD-10-CM

## 2024-03-05 DIAGNOSIS — Z79.899 ENCOUNTER FOR LONG-TERM (CURRENT) USE OF HIGH-RISK MEDICATION: ICD-10-CM

## 2024-03-05 PROCEDURE — 3074F SYST BP LT 130 MM HG: CPT | Performed by: OTHER

## 2024-03-05 PROCEDURE — 3078F DIAST BP <80 MM HG: CPT | Performed by: OTHER

## 2024-03-05 PROCEDURE — 99215 OFFICE O/P EST HI 40 MIN: CPT | Performed by: OTHER

## 2024-03-05 RX ORDER — LEVETIRACETAM 1000 MG/1
TABLET ORAL
Qty: 225 TABLET | Refills: 0 | OUTPATIENT
Start: 2024-03-05

## 2024-03-05 RX ORDER — LEVETIRACETAM 1000 MG/1
TABLET ORAL
Qty: 75 TABLET | Refills: 0 | Status: SHIPPED | OUTPATIENT
Start: 2024-03-05

## 2024-03-05 NOTE — PROGRESS NOTES
Neurology Return History & Physical    CHIEF COMPLAINT / REASON FOR VISIT:    Chief Complaint   Patient presents with    Neurologic Problem     Pt here for f/u due to seizures. States she has not had seizures and she has been feeling good      HISTORY OBTAINED FROM:  Patient and others as above  Data review (see below)    HISTORY OF PRESENT ILLNESS:  Shilpa Lopez is a 33 year old  female with seizures since around age 12 with events of LOC without shaking.  In 2016 (age 25) she had another event of LOC with urinary incontinence without shaking.  Was told EEG abnormal at this time.  Started LEV and had no recurrent events of this type.    Feb 2023 she had a different event where she felt hot, disoriented, \"stomach dropped\" but did not lose consciousness.  This lasted a few minutes.  These symptoms do not occur during other events.      No other staring events.    She is pregnant, GUANAKO 3/28/24.  Dr. Baker has been gradually increasing LEV - now up to 7132-5633 mg daily (was on 750 mg prior to Feb 2023 seizure, was increased from 1000 to 1250 due to low level but by July 2023 was already pregnant).    No side effects, mood is stable.    INTERIM HISTORY:  No seizures, no complaints.  Tolerating LEV reg release 7070-1638.    Driving status:  Driving    Previous medication trials:  None    Epilepsy risk factors:  Normal birth and development   No febrile or childhood seizures   No meningitis/encephalitis (had LP as an infant for fever, but was ok)  No head trauma with prolonged LOC/amnesia   No known structural brain lesions    DATA REVIEWED:  As documented in the HPI    LEV levels  Feb 5 2024 11  Dec 22 2023 10.8  Dec 4 2023 6.8  Nov 22 2023 4.4  Oct 20 2023 10  Jul 19 2023 12  Mar 6 2023 6.4    Nov 2023, Jul 2023, Mar 2023  Neuro note (Samuel / Delilah)    Feb 2023  MRI brain unremarkable     Jan 2023  EEG unremarkable     2016   MRI brain unremarkable   EEG 3 Hz gen spike and wave    NEUROLOGICAL FAMILY HISTORY:  No  seizures    PAST MEDICAL HISTORY:  Past Medical History:   Diagnosis Date    Ankylosing spondylitis (HCC)     Asthma (HCC)     Extrinsic asthma, unspecified     sports induced    GERD (gastroesophageal reflux disease)     Hematuria     weird presentation of ibs    History of gross hematuria 2012    History of recurrent UTIs 2012    Interstitial cystitis     Irritable bowel     Knee pain     MRSA infection 02/2012    Eye    Other and unspecified ovarian cysts     Pain in both knees, unspecified chronicity     Pelvic inflammatory disease     PONV (postoperative nausea and vomiting)     nausea    Right ankle pain, unspecified chronicity     Right rotator cuff tendonitis     Seizure disorder (HCC) 02/2016    Sees Dr. Lin- last seizure November 2015       PAST SURGICAL HISTORY:  Past Surgical History:   Procedure Laterality Date    COLONOSCOPY  02/01/2012    nl ti, nl colon, nl random colon bx.    COLONOSCOPY N/A 05/21/2018    Procedure: COLONOSCOPY;  Surgeon: Henry Carpio MD;  Location:  ENDOSCOPY    OTHER SURGICAL HISTORY  03/30/2012    Cystoscopy - Dr. Vidal     OTHER SURGICAL HISTORY  01/01/2012    Negative - ab. 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[employer], commutes, 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[employer]    Vaping Use    Vaping Use: Never used   Substance and Sexual Activity    Alcohol use: Yes     Alcohol/week: 0.0 standard drinks of alcohol     Comment: 2 a day when healthy    Drug use: No     Comment:  Pt has medical card for use    Sexual activity: Yes     Partners: Male   Other Topics Concern    Caffeine Concern Yes     Comment: 1/2 coffee a couple times a week    Exercise No       ALLERGIES:  Allergies   Allergen Reactions    Amitriptyline FATIGUE, HALLUCINATION, OTHER (SEE COMMENTS), NAUSEA AND VOMITING and CONFUSION     Profound sleepiness.    Causes lethargy   Other reaction(s): FATIGUE   Strange dreams and sleeping too much   Profound sleepiness.    Severe fatique    Etanercept FATIGUE, OTHER (SEE COMMENTS) and NAUSEA AND VOMITING     Paralysis of leg   Other reaction(s): Weakness   Lower extremity weakness    Lower extremity weakness    Leg paralysis    Paralysis of leg   Other reaction(s): Weakness   Lower extremity weakness   Lower extremity weakness       CURRENT MEDICATIONS:   levetiracetam 1000 MG Oral Tab Take one in morning and one-and-one-half in evening 75 tablet 0    Prenatal Vit w/Pq-Xsptohzpa-OE (PNV OR) Take by mouth daily.      folic acid 1 MG Oral Tab Take 4 tablets (4 mg total) by mouth daily.      Cholecalciferol (VITAMIN D) 1000 units Oral Tab Take 1,000 tablets by mouth daily.         REVIEW OF SYSTEMS:  Patient did not have additional neurological, psychiatric, respiratory, cardiovascular, gastrointestinal, or genitourinary symptoms.    PHYSICAL EXAMINATION:  /62   Pulse 105   Resp 16   Wt 186 lb 9.6 oz (84.6 kg)   LMP 06/22/2023   BMI 29.23 kg/m²     Gen: WDWN, in NAD  MSE: nl attn/conc, nl language, nl fund of knowledge  CN: EOMI, VFF, nl facial mvmt, nl palate, nl tongue  Motor: 5/5 x4  DTR: 2+ BUE and BLE  Coord: nl FTN b/I  Gait: normal       ASSESSMENT & PLAN:      ICD-10-CM    1. Convulsions, unspecified convulsion type (HCC)  R56.9       2. Encounter for long-term (current) use of high-risk medication  Z79.899 Levetiracetam, Serum        Presumed generalized epilepsy in pregnancy, which is a high risk state.  Epilepsy has been well controlled for about one  year.    Continue on folate 4 mg and PNV.    Pre-pregnancy, she was at a level of 6.4 on 1000 mg daily, was increased to 1250 mg daily and then became pregnant, level was 12.  Continue LEV 4048-4479 mg XR daily, requiring intense monitoring, check level today, and may adjust depending on level.  So post delivery we should aim to return to 1250 mg daily (meaning, 500 + 750 XR at night, none in AM).  I have included ob/gyn guidance at the end of my note.    We discussed medication side effects and activity precautions.  We discussed precautions surrounding pregnancy and delivery, including that she may breastfeed if she wants, she should take precautions with changing and bathing the baby, and try to minimize sleep deprivation as much as realistically possible.  We discussed symptoms that would warrant urgent/emergent evaluation. Patient verbalized understanding and agreement.    Return in about 3 months (around 6/5/2024).    For the ob/gyn:    Shilpa Lopez has a history of epilepsy, and given her pending delivery, I am providing a list of standard recommendation for any such patient that is undergoing a procedure that creates emotional or physical stress on their system, in order to mitigate risk.  Please bear in mind that these are not necessarily patient-specific recommendations.    1. Please allow the patient to take their regular home doses of anti-seizure drugs (ASDs) with a sip of water, without delay, even if the patient is kept NPO.  Even jovan- and post-delivery, the regular ASDs should not be missed.  Some ASDs are not available in IV form and therefore must be given orally.      2. In addition to not missing medications, maintaining adequate hydration and avoiding hypoglycemia are also important in preventing seizures.  Long durations of NPO status should be avoided.  Sleep deprivation and infection (particularly fever) can also trigger seizures, but of course sometimes these situations cannot be  prevented.    3. Lorazepam (e.g., 1-2 mg IV) can be beneficial in managing seizures during and after the delivery/procedure; this should not be given routinely, but can be considered in an urgent situation at the discretion of the attending obstetrician, proceduralist, and/or anesthesiologist.    4. Please avoid the use of the following medications, as they tend to reduce the seizure threshold:   Analgesics:  Ultram, Tramadol, Demerol    Antibiotics: Fluoroquinolones, Carbapenems, Cefepime    5. Please bear in mind that if the patient experiences a seizure around the time of delivery, it is important to determine if the seizure was similar or different from the patient's usual seizure symptom and frequency (this can be done by history). Depending on the situation, causes other than the patient's known epilepsy may need to be considered (e.g., PRES, eclampsia, cerebral venous sinus thrombosis).    6. In general, the benefits of breastfeeding outweigh the risks of ASD exposure to the infant.    7. If the patient is on lamotrigine or levetiracetam, the dose should be lowered after delivery to the pre-pregnancy dose.  There are no standard guidelines for lowering the doses, but one consideration would be to remove half of the added dose the day after delivery, and the other half one week after.     a. the patient was changed from levetiracetam 1250 mg daily (before pregnancy) to 2500 mg daily (towards the end of pregnancy)   b. the day after delivery, dose should be reduced to 500 mg in AM and 1500 mg in PM   c. one week after delivery, dose should be reduced to 1250 mg HS of extended release           To summarize medical decision making:  Problems:  I addressed at least 1 acute or chronic illness that poses a threat to life or bodily function  Risk:  There is High risk of morbidity from diagnostic testing or treatment, due to drug therapy requiring intensive monitoring    Thaddeus Monge MD, FAES, FAAN  Board-Certified  in Neurology, Epilepsy, and Clinical Neurophysiology  [facility]

## 2024-03-05 NOTE — PATIENT INSTRUCTIONS
Refill policies:    Allow 2-3 business days for refills; controlled substances may take longer.  Contact your pharmacy at least 5 days prior to running out of medication and have them send an electronic request or submit request through the “request refill” option in your [place] [employer] account.  Refills are not addressed on weekends; covering physicians do not authorize routine medications on weekends.  No narcotics or controlled substances are refilled after noon on Fridays or by on call physicians.  By law, narcotics must be electronically prescribed.  A 30 day supply with no refills is the maximum allowed.  If your prescription is due for a refill, you may be due for a follow up appointment.  To best provide you care, patients receiving routine medications need to be seen at least once a year.  Patients receiving narcotic/controlled substance medications need to be seen at least once every 3 months.  In the event that your preferred pharmacy does not have the requested medication in stock (e.g. Backordered), it is your responsibility to find another pharmacy that has the requested medication available.  We will gladly send a new prescription to that pharmacy at your request.    Scheduling Tests:    If your physician has ordered radiology tests such as MRI or CT scans, please contact Central Scheduling at [phone] right away to schedule the test.  Once scheduled, the [facility] include [facility] beds at the [facility] Centralized Referral Team will work with your insurance carrier to obtain pre-certification or prior authorization.  Depending on your insurance carrier, approval may take 3-10 days.  It is highly recommended patients assure they have received an authorization before having a test performed.  If test is done without insurance authorization, patient may be responsible for the entire amount billed.      Precertification and Prior Authorizations:  If your physician has recommended that you have a procedure or additional testing performed the [facility] include [facility] beds at the [facility]  Centralized Referral Team will contact your insurance carrier to obtain pre-certification or prior authorization.    You are strongly encouraged to contact your insurance carrier to verify that your procedure/test has been approved and is a COVERED benefit.  Although the [facility] Centralized Referral Team does its due diligence, the insurance carrier gives the disclaimer that \"Although the procedure is authorized, this does not guarantee payment.\"    Ultimately the patient is responsible for payment.   Thank you for your understanding in this matter.  Paperwork Completion:  If you require FMLA or disability paperwork for your recovery, please make sure to either drop it off or have it faxed to our office at [phone]. Be sure the form has your name and date of birth on it.  The form will be faxed to our Forms Department and they will complete it for you.  There is a 25$ fee for all forms that need to be filled out.  Please be aware there is a 10-14 day turnaround time.  You will need to sign a release of information (QUINN) form if your paperwork does not come with one.  You may call the Forms Department with any questions at [phone].  Their fax number is [phone].

## 2024-03-06 ENCOUNTER — OFFICE VISIT (OUTPATIENT)
Dept: PHYSICAL THERAPY | Age: 34
End: 2024-03-06
Attending: OBSTETRICS & GYNECOLOGY
Payer: COMMERCIAL

## 2024-03-06 PROCEDURE — 97140 MANUAL THERAPY 1/> REGIONS: CPT

## 2024-03-06 PROCEDURE — 97110 THERAPEUTIC EXERCISES: CPT

## 2024-03-06 NOTE — PROGRESS NOTES
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 Feels baby is lower. No spotting in the last week. + Pablo Lofton    Pain: 3-5/10 based on activity      Objective: See Flowsheet for details  3/6/2024: Pelvic alignment: symmetrical. B calf supple & pain-free. Increased antalgic gait pattern. STRs B glutes & T/L PSM      Assessment: Continues with increased challenge with bed mobility specifically rolling from supine to sidelying likely 2/2 pain. Focusing on pain relief & mobility in session today.      Goals: (to be met in 8 visits)  Patient will be able to perform Levator Ani/ Transverse Abdominis (Pelvic Brace) contraction with proper technique to prevent VALE at least 90% of the time with ADLs including cough.  Patient will report a reduction of urinary urgency with the ability to prevent UUI at least 90% of the time while walking to the bathroom with use of urge deferment techniques.  Patient will exhibit an increase in B hip Abduction strength to 4/5 to allow for home & community ambulation including errands & medical appointments.  Patient will report a reduction of nocturia from 4x to 1x for improved sleep & ability to function daytime while working.  Patient will report pain at worst 4/10 with transfers in<>out of car & bed.  Patient understands importance of performing HEP to prevent reoccurrence of symptoms.     Plan: Continued goal of pain  reduction, mobility, & strengthening as pt continues through pregnancy.  Date: 1/24/2024  TX#: 2/8 Date: 1/31/2024  TX#: 3/8 Date: 2/7/2024  TX#: 4/8 Date: 2/21/2024  TX#: 5/8 Date: 2/28/2024  Tx#: 6/8 Date: 3/6/2024  Tx#: 7/8   Therex: 25'  Vitals check  Seated thoracic rotation 5x10\" ea  Standing at wall open book stretch x 5 ea  Seated figure-4 piriformis stretch 2x30\" ea   Concurrent with ex's: education re: DUCLE, lift belly with sit<>stand  Manual Therapy: 15'  Standing thoracolumbar PSM & fascial release, STM/ MFR B QL & upper glutes. Use of emollient  Seated tableside with pillow T-spine CPA mobs gr I-III Therex: 23'  Vitals check with education (see above)  Seated figure-4 piriformis stretch 4x30\" ea  Seated HS stretch 4x30\" ea  Neuro Re-ed: 17'  Pelvic alignment check  MET correction techniques: to correct R ant ilial rotation & shotgun technique pubic symphysis (with HOB elevated 45 degrees)  SI/ Serola Belt: check [employer]. Bring into PT session for fit check  Education: standing posture, avoid \"hip pop\"   Neuro Re-ed: 27'  Pelvic alignment check  MET correction techniques: to correct R ant ilial rotation & shotgun technique pubic symphysis (with HOB elevated 45 degrees)  General DULCE education: Exhale with exertion, sit to stand with breathing  SI belt fit/ adjustment with instruction in proper wear. Remove in supine & for sleeping, loosen or remove in sitting. Remove if increased pain, N/T or any s/s fetal distress (do not expect)  Therex: 15'  Flat back stretch 3x30\" @ // bars  Hip Add stretch 2x30\" ea @ // bars  NuStep L2 x 6' with BUE Therex: 41'  Education: Follow up with OB if pt has any definitive/ suspicious kick count changes/ concerns (do not wait until Mon if noted)  NuStep L2 x 8' with BUE  Slantboard 2-way calf stretch 3x30\" ea B  Flat back stretch 3x30\" @ // bars  Hip Add stretch 3x30\" ea @ // bars  Hooklying hip Abd & Add Anuja with green pilates ring 5\" hold, 2x10 ea  Clams 3x10 LLE, 2x10  RLE  Neuro Re-ed: 2'  Pelvic alignment check   Manual Therapy: 33'  STM R/L Adductors over clothing in R/L sidelying with folded pillow support to abdomen & upper leg; HOB elevated 30 degrees  Neuro Re-ed: 2'  Pelvic alignment check  Therex: 8'  Seated APT<>PPT, lat pelvic tilt, CW/ CCW circles on Swiss Ball x 20 ea with HHA tableside   Manual Therapy: 31'  STM: B glutes, T/L PSM in R/L sidelying with pillow between knees with HOB elevated to 30 degrees  Neuro Re-ed: 1'  Pelvic alignment check  Therex: 10'  Supine manual stretches: ITB/ piriformis, HS  Brief HEP review  Recommend pillow between knees for rolling over in bed   HEP: self shotgun MET pubic symphysis with breathing coordination (upon exhalation) to prevent coning & straining    Access Code: [email or web address]  URL: [email or web address]  Date: 01/16/2024  Prepared by: Kassandra Randhawa    Exercises  - Seated Hip Abduction with Resistance  - 1 x daily - 3-5 x weekly - 10 reps - 10 sec hold (Blue TheraBand)  - Seated Hip Adduction Squeeze with Ball  - 1 x daily - 3-5 x weekly - 10 reps - 10 sec hold (or folded pillow, rolled blanket)    Charges: Manual x 2, Therex x 1    Total Timed Treatment: 42 min  Total Treatment Time: 42 min

## 2024-03-07 ENCOUNTER — ROUTINE PRENATAL (OUTPATIENT)
Dept: OBGYN CLINIC | Facility: CLINIC | Age: 34
End: 2024-03-07
Payer: COMMERCIAL

## 2024-03-07 VITALS
WEIGHT: 186 LBS | HEART RATE: 97 BPM | HEIGHT: 67 IN | SYSTOLIC BLOOD PRESSURE: 116 MMHG | BODY MASS INDEX: 29.19 KG/M2 | DIASTOLIC BLOOD PRESSURE: 60 MMHG

## 2024-03-07 DIAGNOSIS — Z34.00 SUPERVISION OF NORMAL FIRST PREGNANCY, ANTEPARTUM (HCC): Primary | ICD-10-CM

## 2024-03-07 PROCEDURE — 3078F DIAST BP <80 MM HG: CPT | Performed by: NURSE PRACTITIONER

## 2024-03-07 PROCEDURE — 3074F SYST BP LT 130 MM HG: CPT | Performed by: NURSE PRACTITIONER

## 2024-03-07 PROCEDURE — 87081 CULTURE SCREEN ONLY: CPT | Performed by: NURSE PRACTITIONER

## 2024-03-07 PROCEDURE — 3008F BODY MASS INDEX DOCD: CPT | Performed by: NURSE PRACTITIONER

## 2024-03-07 PROCEDURE — 87150 DNA/RNA AMPLIFIED PROBE: CPT | Performed by: NURSE PRACTITIONER

## 2024-03-07 NOTE — PROGRESS NOTES
NAT  Doing well, +FM  Denies VB/LOF/uctx  Mode of delivery:  anticipated, will plan IOL after due date  Labor precautions discussed  SVE declined   GBS collected  RTC 1 week

## 2024-03-07 NOTE — TELEPHONE ENCOUNTER
Dr. Alvarez      *The ACKNOWLEDGE button has been moved to the top right ribbon*    Please sign off on form if you agree to: FMLA for maternity leave   (place your signature on the first page only)    -From your Inbasket, Highlight the patient and click Chart   -Double click the 2/22/2024 Forms Completion telephone encounter  -Scroll down to the Media section   -Click the blue Hyperlink: FRANCISCO Alvarez 3/7/24  -Click Acknowledge located in the top right ribbon/menu   -Drag the mouse into the blank space of the document and a + sign will appear. Left click to   electronically sign the document.     Thank you,  Raegan MEHTA

## 2024-03-08 ENCOUNTER — TELEPHONE (OUTPATIENT)
Dept: OBGYN CLINIC | Facility: CLINIC | Age: 34
End: 2024-03-08

## 2024-03-08 NOTE — TELEPHONE ENCOUNTER
FMLA forms have been completed and faxed to [facility]. [phone]. Confirmation received. [employer] message sent.

## 2024-03-08 NOTE — TELEPHONE ENCOUNTER
----- Message from CLAUDIA Lock sent at 3/7/2024  4:19 PM CST -----  Patient would like to schedule IOL with [facility] the first date available just before or after her due date please

## 2024-03-09 LAB — GROUP B STREP BY PCR FOR PCR OVT: NEGATIVE

## 2024-03-12 ENCOUNTER — ROUTINE PRENATAL (OUTPATIENT)
Dept: OBGYN CLINIC | Facility: CLINIC | Age: 34
End: 2024-03-12
Payer: COMMERCIAL

## 2024-03-12 VITALS
HEART RATE: 95 BPM | BODY MASS INDEX: 29.51 KG/M2 | DIASTOLIC BLOOD PRESSURE: 64 MMHG | WEIGHT: 188 LBS | HEIGHT: 67 IN | SYSTOLIC BLOOD PRESSURE: 106 MMHG

## 2024-03-12 DIAGNOSIS — Z34.90 PRENATAL CARE, ANTEPARTUM (HCC): Primary | ICD-10-CM

## 2024-03-12 PROCEDURE — 3078F DIAST BP <80 MM HG: CPT | Performed by: OBSTETRICS & GYNECOLOGY

## 2024-03-12 PROCEDURE — 3008F BODY MASS INDEX DOCD: CPT | Performed by: OBSTETRICS & GYNECOLOGY

## 2024-03-12 PROCEDURE — 3074F SYST BP LT 130 MM HG: CPT | Performed by: OBSTETRICS & GYNECOLOGY

## 2024-03-12 NOTE — PROGRESS NOTES
Chief Complaint   Patient presents with    Prenatal Care     NAT      Routine prenatal visit without complaints.  Patient denies any bleeding, leaking fluid, cramping, or regular uterine contractions. Good fetal movement.  Assessment/Plan:  37w5d doing well  GBS neg  Kick counts reviewed.  Reviewed labor signs and symptoms.  Diagnoses and all orders for this visit:    Prenatal care, antepartum (HCC)       Return in about 1 week (around 3/19/2024) for Routine Prenatal Visit.

## 2024-03-12 NOTE — PATIENT INSTRUCTIONS
Labor Instructions    How do I know if it’s true labor?  One of the most important aspects of any pregnancy is being able to recognize the onset of labor.  Unfortunately, on occasion it can be difficult or confusing, especially if you have had one or more children.  Each labor can begin in a different way even if you have had four or five children.    If this is your first child, it is very common to have labor for an average greater than 10 hours; however, there have been rare instances for labor to be two hours or less for a first time mother. It is more important for you to know if this is your second or third baby to realize that any labor after the first is usually shorter.  There is no way to tell how long or short the labor will be. Therefore, please call us if you are unsure labor has started.       Usually, during the last six weeks of pregnancy, Pablo-Lofton contractions or “false labor pains occur”.  False labor is generally not very painful though it is not always easy to tell.  You may feel contractions, cramps or uterine tightening somewhere between every 3-30 minutes but they will not continue to get stronger over time.  If you lie down, drink plenty of fluid or walk around, the contractions may go away.   False contractions are very common if you have been active on your feet for several hours.   Women frequently worry about being sent home from the hospital without having their baby (i.e. the labor stopped).  Actually, this is an unnecessary worry, for this is an infrequent occurrence.     True labor usually begins in one of two ways.  In most patients it begins with contractions of the uterus, which are irregular (but not always) in the beginning.  They are cramp-like in character and feel similar to menstrual cramps.  After a while, they become more regular, and they seem to last a little longer, and feel a little sharper.  These symptoms are very important-more important- than the timing of the  contractions.  Having regular (usually closer), longer lasting (35-70 seconds), and sharper (more painful) contractions are the common symptoms of actual labor.  The second way in which labor can begin which occurs in approximately 30% of all patients is the rupture of the bag of water.  This is a sudden gush of watery fluid, usually sufficient to run down your leg and onto the floor, or you may wet a large area of the bed if it happens at night.  There may also be tricking that is uncontrolled.  If you are unsure, please call the office.      When should I call?  When contractions are strong and every 3-5 minutes.  If you have a gush of water or you think you might be leaking fluid.  If you are bleeding heavily.  If your baby is not moving around at least every 1-2 hours.  If you are worried about something.  When you think you are ready to go to the hospital.    Who do I call?  Call the office at [phone].  If the office is closed, the answering service will send a message to the physician on call.  The on call physician will be available for emergency phone calls only.          Can I eat in labor?  It is good to eat a light meal at home before going to the hospital.  Eat foods like crackers, popsicles, soup and fruit.  Avoid foods that are difficult to digest like meat, a lot of dairy products and high fat foods.  DO NOT EAT if you know you are scheduled for a  ().      What will happen when I get to the hospital?  When you arrive at the hospital, you will be admitted and examined.   There are a few factors that will determine if you will be allowed to be up out of bed or if you would need to stay in bed.  The main factors are how well the baby is entering into the pelvis and if the bag of terry is in intact or ruptured.  An intravenous (IV) solution will, with exceptions, be started.  This is extremely important especially for the baby.   Your  will be allowed in the room during your  labor. During the delivery, the nurses will inform you of the hospital policy and how many coaches are allowed.  You may desire pain medication or anesthesia for pain.  You probably discussed some aspects of pain medication with us during your prenatal care.  The various options may also have been discussed in Prenatal Classes.  We utilize IV narcotics and epidural anesthesia when our patients request to have them.  If you chose to have no anesthesia, none will be administered.  A local anesthetic may be used at the time of delivery      What should I to bring to the hospital?  Maternity clothes to go home in  You can bring your own night gown to wear after giving birth, but most women prefer to wear the hospital gown because it may get soiled  Nursing Bra if you are planning to breastfeed  Clothes for your baby to go home in  Baby Car Seat.  Be sure you know how to install it correctly. Please install it before going to the hospital  Routine toiletries like toothbrush, shampoo, hairbrush and etc.   You can bring your favorite pillow, but please put a colored pillow case on it so it doesn’t get mixed up with hospital pillows    How long will I stay in the hospital?  The date you leave the hospital may vary depending on the speed of your recovery.  If you have a vaginal delivery, you will stay in the hospital 24-48 hours after your delivery as long as you aren’t having any complications.    If you have had a , you will stay in the hospital 48-72 hours as long as you aren’t having any complications.       Going Home Instructions  There are no set rules as to what you may do each day or week after you are home.  You will receive discharge instructions to help you each day.  Remember, early ambulation in the hospital is to prevent complications.  Do not let this lull you into a false sense of strength or ability to do certain physical acts which may tire you excessively.  Please call the office within a few days  after you are discharged from the hospital to schedule your post-partum visit, which is usually 4-6 weeks after delivery.    Any medications necessary will be discussed on an individual basis.  If you decide to breastfeed your baby, you should continue your prenatal vitamins.  If you do not breastfeed, simply finish the prenatal vitamins you have.      The staff at [facility] wish you a joyous and exciting birth.  If there is anything we can do to make this a better experience for you please do not hesitate to ask.       FETAL MOVEMENT CHART    Although not all women need to perform daily fetal movement counts, if you notice a decrease in fetal activity, you should contact our office immediately.      Begin counting fetal movements at 32 weeks of pregnancy.  You may find that your baby is more active after eating or drinking.       We want you to time how long it takes to feel 10 movements (kicks, flutters, swishes or rolls).  You should feel at least 10 movements in 2 hours.  You will likely feel 10 movements in less than 2 hours.    If you have concerns, you can use the attached table to record movements.  Record the time you feel the first movement and the tenth movement.  This will help you observe patterns and discover how long it normally takes your baby to move 10 times.       Please call us if any of the following occurs:  You have not felt the baby move for a couple of hours  It is taking longer each day to get the tenth movement    The main point is we want you to know the characteristics of your baby, so you can tell the doctor or nurse if something different is happening.    Again, if you notice a decrease in fetal movement, please give the office a call.    If our office is closed, the answering service will page the doctor on-call.    ------------------------------      Time M T W Th F S S                                                                                                                  Time M T W Th F S S                                                                                                           Time M T W Th F S S                                                                                                           Time M T W Th F S S

## 2024-03-13 ENCOUNTER — OFFICE VISIT (OUTPATIENT)
Dept: PHYSICAL THERAPY | Age: 34
End: 2024-03-13
Attending: OBSTETRICS & GYNECOLOGY
Payer: COMMERCIAL

## 2024-03-13 PROCEDURE — 97140 MANUAL THERAPY 1/> REGIONS: CPT

## 2024-03-13 NOTE — PROGRESS NOTES
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     Subjective: Pt reports significant pubic symphysis pain. Waking up every 30 min at night when trying to sleep.     Pain: 4/10 sitting at rest, up to 6/10 with walking up to 8/10 with rolling over in bed      Objective: See Flowsheet for details  3/13/2024: Pelvic alignment: R ant ilial rotation. Tight R hip IR PROM. Increased antalgic gait pattern. STRs R glutes & T/L PSM      Assessment: Pt has completed 8 visits of skilled PT. Pt is 37w6d. Pt & PT are in agreement pt is appropriate to be discharged from current PT POC. Pt is scheduled for induction on 3/27/2024. Pt is aware to discuss appropriateness of restarting PT postpartum under new POC upon discretion from MD.      Goals: (to be met in 8 visits) - Formal goal assessment not completed today due to increased pain levels present throughout visit  Patient will be able to perform Levator Ani/ Transverse Abdominis (Pelvic Brace) contraction with proper technique to prevent VALE at least 90% of the time with ADLs including cough.  Patient will report a reduction of urinary urgency with the ability to prevent UUI at least 90% of the time while walking to the bathroom with use of urge deferment techniques.  Patient will exhibit an increase in B hip Abduction strength to 4/5 to allow for home & community ambulation including errands & medical appointments.  Patient  will report a reduction of nocturia from 4x to 1x for improved sleep & ability to function daytime while working.  Patient will report pain at worst 4/10 with transfers in<>out of car & bed.  Patient understands importance of performing HEP to prevent reoccurrence of symptoms.     Plan: Discharge from current PT POC    Patient/Family/Caregiver was advised of these findings, precautions, and treatment options and has agreed to actively participate in planning and for this course of care.    Thank you for your referral. If you have any questions, please contact me at Dept: [phone].    Sincerely,  Electronically signed by therapist: Kassandra Randhawa, PT     Physician's certification required:  No  Please co-sign or sign and return this letter via fax as soon as possible to [phone].   I certify the need for these services furnished under this plan of treatment and while under my care.    X___________________________________________________ Date____________________    Certification From: 3/13/2024  To:6/11/2024     Date: 1/24/2024  TX#: 2/8 Date: 1/31/2024  TX#: 3/8 Date: 2/7/2024  TX#: 4/8 Date: 2/21/2024  TX#: 5/8 Date: 2/28/2024  Tx#: 6/8 Date: 3/6/2024  Tx#: 7/8 Date: 3/13/2024  Tx#: 8/8  Discharge   Therex: 25'  Vitals check  Seated thoracic rotation 5x10\" ea  Standing at wall open book stretch x 5 ea  Seated figure-4 piriformis stretch 2x30\" ea   Concurrent with ex's: education re: , lift belly with sit<>stand  Manual Therapy: 15'  Standing thoracolumbar PSM & fascial release, STM/ MFR B QL & upper glutes. Use of emollient  Seated tableside with pillow T-spine CPA mobs gr I-III Therex: 23'  Vitals check with education (see above)  Seated figure-4 piriformis stretch 4x30\" ea  Seated HS stretch 4x30\" ea  Neuro Re-ed: 17'  Pelvic alignment check  MET correction techniques: to correct R ant ilial rotation & shotgun technique pubic symphysis (with HOB elevated 45 degrees)  SI/ Serola Belt: check [employer]. Bring  into PT session for fit check  Education: standing posture, avoid \"hip pop\"   Neuro Re-ed: 27'  Pelvic alignment check  MET correction techniques: to correct R ant ilial rotation & shotgun technique pubic symphysis (with HOB elevated 45 degrees)  General DULCE education: Exhale with exertion, sit to stand with breathing  SI belt fit/ adjustment with instruction in proper wear. Remove in supine & for sleeping, loosen or remove in sitting. Remove if increased pain, N/T or any s/s fetal distress (do not expect)  Therex: 15'  Flat back stretch 3x30\" @ // bars  Hip Add stretch 2x30\" ea @ // bars  NuStep L2 x 6' with BUE Therex: 41'  Education: Follow up with OB if pt has any definitive/ suspicious kick count changes/ concerns (do not wait until Mon if noted)  NuStep L2 x 8' with BUE  Slantboard 2-way calf stretch 3x30\" ea B  Flat back stretch 3x30\" @ // bars  Hip Add stretch 3x30\" ea @ // bars  Hooklying hip Abd & Add Anuja with green pilates ring 5\" hold, 2x10 ea  Clams 3x10 LLE, 2x10 RLE  Neuro Re-ed: 2'  Pelvic alignment check   Manual Therapy: 33'  STM R/L Adductors over clothing in R/L sidelying with folded pillow support to abdomen & upper leg; HOB elevated 30 degrees  Neuro Re-ed: 2'  Pelvic alignment check  Therex: 8'  Seated APT<>PPT, lat pelvic tilt, CW/ CCW circles on Swiss Ball x 20 ea with HHA tableside   Manual Therapy: 31'  STM: B glutes, T/L PSM in R/L sidelying with pillow between knees with HOB elevated to 30 degrees  Neuro Re-ed: 1'  Pelvic alignment check  Therex: 10'  Supine manual stretches: ITB/ piriformis, HS  Brief HEP review  Recommend pillow between knees for rolling over in bed Neuro Re-ed: 5'  MET corrections  Manual Therapy: 28'  STM: R glutes, T/L PSM in L sidelying with pillow between knees with HOB elevated to 30 degrees   HEP: self shotgun MET pubic symphysis with breathing coordination (upon exhalation) to prevent coning & straining    Access Code: [email or web address]  URL:  [email or web address]  Date: 01/16/2024  Prepared by: Kassandra Randhawa    Exercises  - Seated Hip Abduction with Resistance  - 1 x daily - 3-5 x weekly - 10 reps - 10 sec hold (Blue TheraBand)  - Seated Hip Adduction Squeeze with Ball  - 1 x daily - 3-5 x weekly - 10 reps - 10 sec hold (or folded pillow, rolled blanket)    Charges: Manual x 2    Total Timed Treatment: 33 min  Total Treatment Time: 33 min

## 2024-03-20 ENCOUNTER — APPOINTMENT (OUTPATIENT)
Dept: PHYSICAL THERAPY | Age: 34
End: 2024-03-20
Attending: OBSTETRICS & GYNECOLOGY
Payer: COMMERCIAL

## 2024-03-21 ENCOUNTER — ROUTINE PRENATAL (OUTPATIENT)
Dept: OBGYN CLINIC | Facility: CLINIC | Age: 34
End: 2024-03-21
Payer: COMMERCIAL

## 2024-03-21 VITALS
SYSTOLIC BLOOD PRESSURE: 112 MMHG | BODY MASS INDEX: 29.51 KG/M2 | WEIGHT: 188 LBS | DIASTOLIC BLOOD PRESSURE: 62 MMHG | HEIGHT: 67 IN | HEART RATE: 82 BPM

## 2024-03-21 DIAGNOSIS — Z34.90 PRENATAL CARE, ANTEPARTUM (HCC): Primary | ICD-10-CM

## 2024-03-21 DIAGNOSIS — O36.8190 DECREASED FETAL MOVEMENT AFFECTING MANAGEMENT OF PREGNANCY, ANTEPARTUM, SINGLE OR UNSPECIFIED FETUS (HCC): ICD-10-CM

## 2024-03-21 PROCEDURE — 3074F SYST BP LT 130 MM HG: CPT | Performed by: NURSE PRACTITIONER

## 2024-03-21 PROCEDURE — 3078F DIAST BP <80 MM HG: CPT | Performed by: NURSE PRACTITIONER

## 2024-03-21 PROCEDURE — 3008F BODY MASS INDEX DOCD: CPT | Performed by: NURSE PRACTITIONER

## 2024-03-21 PROCEDURE — 59025 FETAL NON-STRESS TEST: CPT | Performed by: NURSE PRACTITIONER

## 2024-03-21 NOTE — PROGRESS NOTES
NAT  Doing well, +FM but decreased from typical  Denies VB/LOF/uctx  Mode of delivery: IOL scheduled/  anticipated  Labor precautions discussed  NST was reactive

## 2024-03-26 ENCOUNTER — TELEPHONE (OUTPATIENT)
Dept: OBGYN CLINIC | Facility: CLINIC | Age: 34
End: 2024-03-26

## 2024-03-26 ENCOUNTER — APPOINTMENT (OUTPATIENT)
Dept: OBGYN CLINIC | Facility: HOSPITAL | Age: 34
End: 2024-03-26
Payer: COMMERCIAL

## 2024-03-26 ENCOUNTER — ANESTHESIA EVENT (OUTPATIENT)
Dept: OBGYN UNIT | Facility: HOSPITAL | Age: 34
End: 2024-03-26
Payer: COMMERCIAL

## 2024-03-26 ENCOUNTER — HOSPITAL ENCOUNTER (INPATIENT)
Facility: HOSPITAL | Age: 34
LOS: 2 days | Discharge: HOME | End: 2024-03-28
Attending: OBSTETRICS & GYNECOLOGY | Admitting: OBSTETRICS & GYNECOLOGY
Payer: COMMERCIAL

## 2024-03-26 ENCOUNTER — ANESTHESIA (OUTPATIENT)
Dept: OBGYN UNIT | Facility: HOSPITAL | Age: 34
End: 2024-03-26
Payer: COMMERCIAL

## 2024-03-26 PROBLEM — Z34.03 PREGNANCY, FIRST, THIRD TRIMESTER (HCC): Status: ACTIVE | Noted: 2023-09-13

## 2024-03-26 PROBLEM — O47.9 UTERINE CONTRACTIONS (HCC): Status: ACTIVE | Noted: 2024-03-26

## 2024-03-26 PROBLEM — Z34.90 PREGNANCY (HCC): Status: ACTIVE | Noted: 2024-03-26

## 2024-03-26 LAB
ANTIBODY SCREEN: NEGATIVE
BASOPHILS # BLD AUTO: 0.06 X10(3) UL (ref 0–0.2)
BASOPHILS NFR BLD AUTO: 0.4 %
EOSINOPHIL # BLD AUTO: 0.05 X10(3) UL (ref 0–0.7)
EOSINOPHIL NFR BLD AUTO: 0.3 %
ERYTHROCYTE [DISTWIDTH] IN BLOOD BY AUTOMATED COUNT: 13.1 %
HCT VFR BLD AUTO: 36 %
HGB BLD-MCNC: 12.1 G/DL
IMM GRANULOCYTES # BLD AUTO: 0.25 X10(3) UL (ref 0–1)
IMM GRANULOCYTES NFR BLD: 1.7 %
LYMPHOCYTES # BLD AUTO: 1.87 X10(3) UL (ref 1–4)
LYMPHOCYTES NFR BLD AUTO: 12.8 %
MCH RBC QN AUTO: 29.8 PG (ref 26–34)
MCHC RBC AUTO-ENTMCNC: 33.6 G/DL (ref 31–37)
MCV RBC AUTO: 88.7 FL
MONOCYTES # BLD AUTO: 0.75 X10(3) UL (ref 0.1–1)
MONOCYTES NFR BLD AUTO: 5.1 %
NEUTROPHILS # BLD AUTO: 11.66 X10 (3) UL (ref 1.5–7.7)
NEUTROPHILS # BLD AUTO: 11.66 X10(3) UL (ref 1.5–7.7)
NEUTROPHILS NFR BLD AUTO: 79.7 %
PLATELET # BLD AUTO: 219 10(3)UL (ref 150–450)
RBC # BLD AUTO: 4.06 X10(6)UL
RH BLOOD TYPE: POSITIVE
T PALLIDUM AB SER QL IA: NONREACTIVE
WBC # BLD AUTO: 14.6 X10(3) UL (ref 4–11)

## 2024-03-26 PROCEDURE — 10907ZC DRAINAGE OF AMNIOTIC FLUID, THERAPEUTIC FROM PRODUCTS OF CONCEPTION, VIA NATURAL OR ARTIFICIAL OPENING: ICD-10-PCS | Performed by: OBSTETRICS & GYNECOLOGY

## 2024-03-26 RX ORDER — IBUPROFEN 600 MG/1
600 TABLET ORAL ONCE AS NEEDED
Status: DISCONTINUED | OUTPATIENT
Start: 2024-03-26 | End: 2024-03-27

## 2024-03-26 RX ORDER — ACETAMINOPHEN 500 MG
500 TABLET ORAL EVERY 6 HOURS PRN
Status: DISCONTINUED | OUTPATIENT
Start: 2024-03-26 | End: 2024-03-27

## 2024-03-26 RX ORDER — HYDROMORPHONE HYDROCHLORIDE 1 MG/ML
1 INJECTION, SOLUTION INTRAMUSCULAR; INTRAVENOUS; SUBCUTANEOUS EVERY 2 HOUR PRN
Status: DISCONTINUED | OUTPATIENT
Start: 2024-03-26 | End: 2024-03-27

## 2024-03-26 RX ORDER — LEVETIRACETAM 500 MG/1
1500 TABLET ORAL EVERY EVENING
Status: DISCONTINUED | OUTPATIENT
Start: 2024-03-26 | End: 2024-03-28

## 2024-03-26 RX ORDER — NALBUPHINE HYDROCHLORIDE 10 MG/ML
2.5 INJECTION, SOLUTION INTRAMUSCULAR; INTRAVENOUS; SUBCUTANEOUS
Status: DISCONTINUED | OUTPATIENT
Start: 2024-03-26 | End: 2024-03-27

## 2024-03-26 RX ORDER — TERBUTALINE SULFATE 1 MG/ML
0.25 INJECTION, SOLUTION SUBCUTANEOUS AS NEEDED
Status: DISCONTINUED | OUTPATIENT
Start: 2024-03-26 | End: 2024-03-27

## 2024-03-26 RX ORDER — BUPIVACAINE HYDROCHLORIDE 2.5 MG/ML
INJECTION, SOLUTION EPIDURAL; INFILTRATION; INTRACAUDAL AS NEEDED
Status: DISCONTINUED | OUTPATIENT
Start: 2024-03-26 | End: 2024-03-27 | Stop reason: SURG

## 2024-03-26 RX ORDER — ACETAMINOPHEN 500 MG
1000 TABLET ORAL EVERY 6 HOURS PRN
Status: DISCONTINUED | OUTPATIENT
Start: 2024-03-26 | End: 2024-03-27

## 2024-03-26 RX ORDER — BUPIVACAINE HCL/0.9 % NACL/PF 0.25 %
5 PLASTIC BAG, INJECTION (ML) EPIDURAL AS NEEDED
Status: DISCONTINUED | OUTPATIENT
Start: 2024-03-26 | End: 2024-03-27

## 2024-03-26 RX ORDER — ONDANSETRON 2 MG/ML
4 INJECTION INTRAMUSCULAR; INTRAVENOUS EVERY 6 HOURS PRN
Status: DISCONTINUED | OUTPATIENT
Start: 2024-03-26 | End: 2024-03-27

## 2024-03-26 RX ORDER — DEXTROSE, SODIUM CHLORIDE, SODIUM LACTATE, POTASSIUM CHLORIDE, AND CALCIUM CHLORIDE 5; .6; .31; .03; .02 G/100ML; G/100ML; G/100ML; G/100ML; G/100ML
INJECTION, SOLUTION INTRAVENOUS AS NEEDED
Status: DISCONTINUED | OUTPATIENT
Start: 2024-03-26 | End: 2024-03-27

## 2024-03-26 RX ORDER — SODIUM CHLORIDE, SODIUM LACTATE, POTASSIUM CHLORIDE, CALCIUM CHLORIDE 600; 310; 30; 20 MG/100ML; MG/100ML; MG/100ML; MG/100ML
INJECTION, SOLUTION INTRAVENOUS CONTINUOUS
Status: DISCONTINUED | OUTPATIENT
Start: 2024-03-26 | End: 2024-03-27

## 2024-03-26 RX ORDER — NALBUPHINE HYDROCHLORIDE 10 MG/ML
2.5 INJECTION, SOLUTION INTRAMUSCULAR; INTRAVENOUS; SUBCUTANEOUS
Status: DISCONTINUED | OUTPATIENT
Start: 2024-03-26 | End: 2024-03-26

## 2024-03-26 RX ORDER — LEVETIRACETAM 500 MG/1
1000 TABLET ORAL DAILY
Status: DISCONTINUED | OUTPATIENT
Start: 2024-03-27 | End: 2024-03-27

## 2024-03-26 RX ORDER — CITRIC ACID/SODIUM CITRATE 334-500MG
30 SOLUTION, ORAL ORAL AS NEEDED
Status: DISCONTINUED | OUTPATIENT
Start: 2024-03-26 | End: 2024-03-27

## 2024-03-26 RX ADMIN — BUPIVACAINE HYDROCHLORIDE 5 ML: 2.5 INJECTION, SOLUTION EPIDURAL; INFILTRATION; INTRACAUDAL at 17:59:00

## 2024-03-26 NOTE — PROGRESS NOTES
Pt is a 33 year old female admitted to TRG2/TRG2-A.     Chief Complaint   Patient presents with    R/o Labor     Pt say she is having contraction since MN      Pt is  39w5d intra-uterine pregnancy.  History obtained, consents signed. Oriented to room, staff, and plan of care.  Updated , orders received to admit pt in labor, IV hydration , pitocin and dilaudid add epidural for pain management , report given to Victoria JACKSON

## 2024-03-26 NOTE — TELEPHONE ENCOUNTER
Pt called stating she is experiencing a lot of cramping and back pain. Pt said she is being induced tonight but is not sure if she should go in sooner based off her symptoms. Please advise.

## 2024-03-26 NOTE — ANESTHESIA PREPROCEDURE EVALUATION
PRE-OP EVALUATION    Patient Name: Shilpa Lopez    Admit Diagnosis: Pregnancy  Pregnancy (HCC)    Pre-op Diagnosis: * No surgery found *        Anesthesia Procedure: LABOR ANALGESIA    * Surgery not found *    Pre-op vitals reviewed.  Temp: 98.4 °F (36.9 °C)  Pulse: 130  BP: (P) 168/131  SpO2: 100 %  Body mass index is 29.44 kg/m².    Current medications reviewed.  Hospital Medications:   lactated ringers infusion   Intravenous Continuous    dextrose in lactated ringers 5% infusion   Intravenous PRN    lactated ringers IV bolus 500 mL  500 mL Intravenous PRN    acetaminophen (Tylenol Extra Strength) tab 500 mg  500 mg Oral Q6H PRN    acetaminophen (Tylenol Extra Strength) tab 1,000 mg  1,000 mg Oral Q6H PRN    ibuprofen (Motrin) tab 600 mg  600 mg Oral Once PRN    ondansetron (Zofran) 4 MG/2ML injection 4 mg  4 mg Intravenous Q6H PRN    oxyTOCIN in sodium chloride 0.9% (Pitocin) 30 Units/500mL infusion premix  62.5-900 jazmyne-units/min Intravenous Continuous    terbutaline (Brethine) 1 MG/ML injection 0.25 mg  0.25 mg Subcutaneous PRN    sodium citrate-citric acid (Bicitra) 500-334 MG/5ML oral solution 30 mL  30 mL Oral PRN    oxyTOCIN in sodium chloride 0.9% (Pitocin) 30 Units/500mL infusion premix  0.5-20 jazmyne-units/min Intravenous Continuous    HYDROmorphone (Dilaudid) 1 MG/ML injection 1 mg  1 mg Intravenous Q2H PRN    [START ON 3/27/2024] levETIRAcetam (Keppra) tab 1,000 mg  1,000 mg Oral Daily    levETIRAcetam (Keppra) tab 1,500 mg  1,500 mg Oral QPM    [COMPLETED] lactated ringers IV bolus 1,000 mL  1,000 mL Intravenous Once    fentaNYL-bupivacaine 2 mcg/mL-0.125% in sodium chloride 0.9% 100 mL EPIDURAL infusion premix  12 mL/hr Epidural Continuous    fentaNYL (Sublimaze) 50 mcg/mL injection 100 mcg  100 mcg Epidural Once    EPHEDrine (PF) 25 MG/5 ML injection 5 mg  5 mg Intravenous PRN    nalbuphine (Nubain) 10 mg/mL injection 2.5 mg  2.5 mg Intravenous Q15 Min PRN       Outpatient Medications:      Medications Prior to Admission   Medication Sig Dispense Refill Last Dose    levetiracetam 1000 MG Oral Tab Take one in morning and one-and-one-half in evening 75 tablet 0     [] amoxicillin 500 MG Oral Cap Take 1 capsule (500 mg total) by mouth 3 (three) times daily for 10 days. 30 capsule 0     [] predniSONE 20 MG Oral Tab Take 1 tablet (20 mg total) by mouth 2 (two) times daily for 5 days. 10 tablet 0     Prenatal Vit w/Ia-Xhszbdaml-YP (PNV OR) Take by mouth daily.       folic acid 1 MG Oral Tab Take 4 tablets (4 mg total) by mouth daily.       Cholecalciferol (VITAMIN D) 1000 units Oral Tab Take 1,000 tablets by mouth daily.          Allergies: Amitriptyline and Etanercept      Anesthesia Evaluation    Patient summary reviewed.    Anesthetic Complications  (+) history of anesthetic complications         GI/Hepatic/Renal      (+) GERD and well controlled                          Cardiovascular    Negative cardiovascular ROS.                                                   Endo/Other    Negative endo/other ROS.                              Pulmonary      (+) asthma                     Neuro/Psych               (+) seizures  (+) neuromuscular disease             ANKYLOSING SPONDYLITIS OF SACROILIAC JOINT        Past Surgical History:   Procedure Laterality Date    COLONOSCOPY  2012    nl ti, nl colon, nl random colon bx.    COLONOSCOPY N/A 2018    Procedure: COLONOSCOPY;  Surgeon: Henry Carpio MD;  Location:  ENDOSCOPY    OTHER SURGICAL HISTORY  2012    Cystoscopy - Dr. Vidal     OTHER SURGICAL HISTORY  2012    Negative - ab. 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  Lab Results   Component Value Date    WBC 14.6 (H) 2024    RBC 4.06 2024    HGB 12.1 2024    HCT 36.0 2024    MCV 88.7 2024    MCH 29.8 2024    MCHC 33.6 2024    RDW 13.1 2024    .0 2024               Airway      Mallampati: II  Mouth opening: 3 FB  TM distance: 4 - 6 cm  Neck ROM: full Cardiovascular    Cardiovascular exam normal.         Dental    Dentition appears grossly intact         Pulmonary    Pulmonary exam normal.                 Other findings              ASA: 2   Plan: epidural  NPO status verified and patient meets guidelines.          Plan/risks discussed with: patient           in labor for labor epidural. R/B/A discussed with patient and her .      Present on Admission:   Pregnancy (HCC)   Uterine contractions (HCC)   Seizure disorder during pregnancy in third trimester (HCC)

## 2024-03-26 NOTE — PLAN OF CARE
Problem: BIRTH - VAGINAL/ SECTION  Goal: Fetal and maternal status remain reassuring during the birth process  Description: INTERVENTIONS:  - Monitor vital signs  - Monitor fetal heart rate  - Monitor uterine activity  - Monitor labor progression (vaginal delivery)  - DVT prophylaxis (C/S delivery)  - Surgical antibiotic prophylaxis (C/S delivery)  Outcome: Progressing     Problem: PAIN - ADULT  Goal: Verbalizes/displays adequate comfort level or patient's stated pain goal  Description: INTERVENTIONS:  - Encourage pt to monitor pain and request assistance  - Assess pain using appropriate pain scale  - Administer analgesics based on type and severity of pain and evaluate response  - Implement non-pharmacological measures as appropriate and evaluate response  - Consider cultural and social influences on pain and pain management  - Manage/alleviate anxiety  - Utilize distraction and/or relaxation techniques  - Monitor for opioid side effects  - Notify MD/LIP if interventions unsuccessful or patient reports new pain  - Anticipate increased pain with activity and pre-medicate as appropriate  Outcome: Progressing     Problem: ANXIETY  Goal: Will report anxiety at manageable levels  Description: INTERVENTIONS:  - Administer medication as ordered  - Teach and rehearse alternative coping skills  - Provide emotional support with 1:1 interaction with staff  Outcome: Progressing     Problem: Patient/Family Goals  Goal: Patient/Family Long Term Goal  Description: Patient's Long Term Goal: Uncomplicated vaginal delivery    Interventions:  VS per protocol  I&O  Ice chips and sips as tolerated  EFM per protocol  Maintain IV as ordered  Antibiotics as needed per protocol  Informed consent      Interventions:  -   - See additional Care Plan goals for specific interventions  Outcome: Progressing  Goal: Patient/Family Short Term Goal  Description: Patient's Short Term Goal: Adequate pain control with delivery of  infant  Interventions:  Pain assessment scores as ordered  Patient scores pain a \"3\" or less  Multidisciplinary care   Nonpharmacologic comfort measures        Interventions:   -   - See additional Care Plan goals for specific interventions  Outcome: Progressing

## 2024-03-26 NOTE — H&P
[facility]   part of [facility]    History & Physical    Shilpa Lopez Patient Status:  Outpatient    1990 MRN [id number]   Location [facility] LABOR & DELIVERY Attending Larisa David MD   Hosp Day # 0 PCP Loretta Pickard MD     Date of Admission:  3/26/2024  1:11 PM       HPI:   Shilpa Lopez is a 33 year old  at 39w5d who was going to be admitted for induction of labor tonight presents with complaint of contractions. Her cervix was closed in the office. Cervix 2 cm/70% here on L&D. No leaking fluid or vaginal bleeding. +FM.     Pregnancy significant for seizure disorder on keppra. No recent seizures. Followed by neurologist Dr. Thaddeus Monge.     Ankylosing spondylitis  -Rheum Agnes De La Garza MD - per note 23 - recent MRI no signs of inflammation. Declines therapy due to planning conception.     Also with asthma, GERD, interstitial cystitis, pelvic floor dysfunction, pubic symphysis pain. Varicella non immune. 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, aware and partner to be tested (results came back after transfer but from previous practice)- spouse is not but 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    Extrinsic asthma, unspecified     sports induced    GERD (gastroesophageal reflux disease)     Hematuria     weird presentation of ibs    History of gross hematuria     History of recurrent UTIs     Interstitial cystitis     Irritable bowel     Knee pain     MRSA  infection 02/2012    Eye    Other and unspecified ovarian cysts     Pain in both knees, unspecified chronicity     Pelvic floor dysfunction 02/06/2024    Pelvic inflammatory disease     PONV (postoperative nausea and vomiting)     nausea    Right ankle pain, unspecified chronicity     Right rotator cuff tendonitis     Seizure disorder (HCC) 02/2016    Sees Dr. Lin- last seizure November 2015. seizures since around age 12 with events of LOC without shaking. In 2016 (age 25) she had another event of LOC with urinary incontinence without shaking. Was told EEG abnormal at this time. Started LEV and had no recurrent events of this type.     Past Social History:   Past Surgical History:   Procedure Laterality Date    COLONOSCOPY  02/01/2012    nl ti, nl colon, nl random colon bx.    COLONOSCOPY N/A 05/21/2018    Procedure: COLONOSCOPY;  Surgeon: Henry Carpio MD;  Location:  ENDOSCOPY    OTHER SURGICAL HISTORY  03/30/2012    Cystoscopy - Dr. Vidal     OTHER SURGICAL HISTORY  01/01/2012    Negative - ab. 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    Causes lethargy   Other reaction(s): FATIGUE   Strange dreams and sleeping too much   Profound sleepiness.    Severe fatique    Etanercept FATIGUE, OTHER (SEE COMMENTS) and NAUSEA AND VOMITING     Paralysis of leg   Other reaction(s): Weakness   Lower extremity weakness    Lower extremity weakness    Leg paralysis    Paralysis of leg   Other reaction(s): Weakness   Lower extremity weakness   Lower extremity weakness     Medications:  Medications Prior to Admission   Medication Sig Dispense Refill Last Dose    levetiracetam 1000 MG Oral Tab Take one in morning and one-and-one-half in evening 75 tablet 0     [] amoxicillin 500 MG Oral Cap Take 1 capsule (500 mg total) by mouth 3 (three) times daily for 10 days. 30 capsule 0     [] predniSONE 20 MG Oral Tab Take 1 tablet (20 mg total) by mouth 2 (two) times daily for 5 days. 10 tablet 0     Prenatal Vit w/Sl-Fgmfecquq-DD (PNV OR) Take by mouth daily.       folic acid 1 MG Oral Tab Take 4 tablets (4 mg total) by mouth daily.       Cholecalciferol (VITAMIN D) 1000 units Oral Tab Take 1,000 tablets by mouth daily.          Review of Systems:   As documented in HPI    Physical Exam:   Temp:  [98.3 °F (36.8 °C)] 98.3 °F (36.8 °C)  Pulse:  [92-96] 96  BP: (104-105)/(68-70) 105/70    Vitals:    24 1355 24 1400 24 1415   BP:  104/68 105/70   Pulse:  92 96   Temp:  98.3 °F (36.8 °C)    Weight: 188 lb (85.3 kg)         Estimated body mass index is 29.44 kg/m² as calculated from the following:    Height as of 3/21/24: 67\".    Weight as of this encounter: 188 lb (85.3 kg).     FHT: 135 bpm, mod ricki, +accels, no decels   [place] not tracing well while on ball, but about every 4 min apart after getting back into bed. On IV oxytocin at 2 milliunits/min  SVE 1.5/80/-2, posterior with AROM 3/26/2024 at 1509     Cervical Exam Data    Cervical Exam Data  Exam Time Dilation Station   1509 1.5 -2   1330 2 -2       Results:       Assessment/Plan:     Shilpa Lopez 33 year old  at 39w5d - admitted in latent labor. Seizure disorder on Keppra, asthma, GERD, ankylosing spondylitis, interstitial cystitis, pelvic floor dysfunction, pubic symphysis pain. Varicella non immune.     +FWB    Latent labor   -Augmentation with IV oxytocin  -s/p amniotomy 3/26/2024 at 1509     Epidural PRN  O+/GBS neg    Seizure disorder  -Keppra 1000 mg tablet - one in morning & 1.5 in evening    Varicella non immune  -should receive varicella vaccine per PCP     Ankylosing spondylitis  -no current therapy  -follow up Rheum postpartum    Yolis Gamboa MD    Note to patient and family:  The  Cent[employer] Cures Act makes medical notes available to patients in the interest of transparency.  However, please be advised that this is a medical document.  It is intended as a peer to peer communication.  It is written in medical language and may contain abbreviations or verbiage that are technical and unfamiliar.  It may appear blunt or direct.  Medical documents are intended to carry relevant information, facts as evident, and the clinical opinion of the practitioner.

## 2024-03-26 NOTE — TELEPHONE ENCOUNTER
Called and spoke with pt.  She says she has been up since 1245 am this am with cramping and waves of contractions, nothing consistent but also having back pain, rated at a 4, and rating cramping 4-6 waves of contractions/cramping      GA 39 weeks 5 days patient complaining of cramping and lower back ain since 12 45 am  Last OV: 3/21/24 Jen JOHNS   Pregnancy Complications: no  Abdominal pain: cramping/inconsistent contractions, hard to time every 10- 15 mins. She thinks  Leaking of fluid: no  Vaginal Bleeding: no  Fetal Movement: +  Recommendations:   Suggested that pt. Go to L/D  to get evaluated, she is due to go in [facility] for an induction anyway.. Pt. Agrees to recommendation and plan,  is home with her and they will go.  L/D..

## 2024-03-26 NOTE — ANESTHESIA PROCEDURE NOTES
Labor Analgesia    Date/Time: 3/26/2024 5:50 PM    Performed by: Lamin Caraballo MD  Authorized by: Lamin Caraballo MD      General Information and Staff    Start Time:  3/26/2024 5:50 PM  End Time:  3/26/2024 5:59 PM  Anesthesiologist:  Lamin Caraballo MD  Performed by:  Anesthesiologist  Patient Location:  OB  Site Identification: surface landmarks    Reason for Block: labor epidural    Preanesthetic Checklist: patient identified, IV checked, site marked, risks and benefits discussed, monitors and equipment checked, pre-op evaluation, timeout performed, IV bolus, anesthesia consent and sterile technique used      Procedure Details    Patient Position:  Sitting  Prep: ChloraPrep    Monitoring:  Heart rate and continuous pulse ox  Approach:  Midline    Epidural Needle    Injection Technique:  MARY air  Needle Type:  Tuohy  Needle Gauge:  17 G  Needle Length:  3.375 in  Needle Insertion Depth:  6  Location:  L2-3    Spinal Needle      Catheter    Catheter Type:  End hole  Catheter Size:  19 G  Catheter at Skin Depth:  12  Test Dose:  Negative    Assessment    Sensory Level:  T10    Additional Comments       Easy labor epidural placement

## 2024-03-27 PROCEDURE — 0KQM0ZZ REPAIR PERINEUM MUSCLE, OPEN APPROACH: ICD-10-PCS | Performed by: OBSTETRICS & GYNECOLOGY

## 2024-03-27 PROCEDURE — 59410 OBSTETRICAL CARE: CPT | Performed by: OBSTETRICS & GYNECOLOGY

## 2024-03-27 RX ORDER — MISOPROSTOL 200 UG/1
TABLET ORAL
Status: COMPLETED
Start: 2024-03-27 | End: 2024-03-27

## 2024-03-27 RX ORDER — ACETAMINOPHEN 500 MG
1000 TABLET ORAL EVERY 6 HOURS PRN
Status: DISCONTINUED | OUTPATIENT
Start: 2024-03-27 | End: 2024-03-28

## 2024-03-27 RX ORDER — MISOPROSTOL 200 UG/1
1000 TABLET ORAL ONCE
Status: COMPLETED | OUTPATIENT
Start: 2024-03-27 | End: 2024-03-27

## 2024-03-27 RX ORDER — DOCUSATE SODIUM 100 MG/1
100 CAPSULE, LIQUID FILLED ORAL
Status: DISCONTINUED | OUTPATIENT
Start: 2024-03-27 | End: 2024-03-28

## 2024-03-27 RX ORDER — LEVETIRACETAM 500 MG/1
500 TABLET ORAL DAILY
Status: DISCONTINUED | OUTPATIENT
Start: 2024-03-28 | End: 2024-03-28

## 2024-03-27 RX ORDER — SIMETHICONE 80 MG
80 TABLET,CHEWABLE ORAL 3 TIMES DAILY PRN
Status: DISCONTINUED | OUTPATIENT
Start: 2024-03-27 | End: 2024-03-28

## 2024-03-27 RX ORDER — ACETAMINOPHEN 500 MG
500 TABLET ORAL EVERY 6 HOURS PRN
Status: DISCONTINUED | OUTPATIENT
Start: 2024-03-27 | End: 2024-03-28

## 2024-03-27 RX ORDER — METHYLERGONOVINE MALEATE 0.2 MG/ML
INJECTION INTRAVENOUS
Status: COMPLETED
Start: 2024-03-27 | End: 2024-03-27

## 2024-03-27 RX ORDER — IBUPROFEN 600 MG/1
600 TABLET ORAL EVERY 6 HOURS
Status: DISCONTINUED | OUTPATIENT
Start: 2024-03-27 | End: 2024-03-28

## 2024-03-27 RX ORDER — METHYLERGONOVINE MALEATE 0.2 MG/ML
0.2 INJECTION INTRAVENOUS ONCE
Status: COMPLETED | OUTPATIENT
Start: 2024-03-27 | End: 2024-03-27

## 2024-03-27 RX ORDER — BISACODYL 10 MG
10 SUPPOSITORY, RECTAL RECTAL ONCE AS NEEDED
Status: DISCONTINUED | OUTPATIENT
Start: 2024-03-27 | End: 2024-03-28

## 2024-03-27 RX ADMIN — BUPIVACAINE HYDROCHLORIDE 10 ML: 2.5 INJECTION, SOLUTION EPIDURAL; INFILTRATION; INTRACAUDAL at 04:00:00

## 2024-03-27 NOTE — PROGRESS NOTES
Patient up to bathroom with assist x 2.  Voided 400 ml. Patient transferred to mother/baby room 1111 per wheelchair in stable condition with baby and personal belongings.  Accompanied by significant other and staff.  Report given to mother/baby RN.

## 2024-03-27 NOTE — PLAN OF CARE
Problem: BIRTH - VAGINAL/ SECTION  Goal: Fetal and maternal status remain reassuring during the birth process  Description: INTERVENTIONS:  - Monitor vital signs  - Monitor fetal heart rate  - Monitor uterine activity  - Monitor labor progression (vaginal delivery)  - DVT prophylaxis (C/S delivery)  - Surgical antibiotic prophylaxis (C/S delivery)  Outcome: Completed     Problem: PAIN - ADULT  Goal: Verbalizes/displays adequate comfort level or patient's stated pain goal  Description: INTERVENTIONS:  - Encourage pt to monitor pain and request assistance  - Assess pain using appropriate pain scale  - Administer analgesics based on type and severity of pain and evaluate response  - Implement non-pharmacological measures as appropriate and evaluate response  - Consider cultural and social influences on pain and pain management  - Manage/alleviate anxiety  - Utilize distraction and/or relaxation techniques  - Monitor for opioid side effects  - Notify MD/LIP if interventions unsuccessful or patient reports new pain  - Anticipate increased pain with activity and pre-medicate as appropriate  Outcome: Completed     Problem: ANXIETY  Goal: Will report anxiety at manageable levels  Description: INTERVENTIONS:  - Administer medication as ordered  - Teach and rehearse alternative coping skills  - Provide emotional support with 1:1 interaction with staff  Outcome: Completed     Problem: Patient/Family Goals  Goal: Patient/Family Long Term Goal  Description: Patient's Long Term Goal: Uncomplicated vaginal delivery    Interventions:  VS per protocol  I&O  Ice chips and sips as tolerated  EFM per protocol  Maintain IV as ordered  Antibiotics as needed per protocol  Informed consent      Interventions:  -   - See additional Care Plan goals for specific interventions  Outcome: Completed  Goal: Patient/Family Short Term Goal  Description: Patient's Short Term Goal: Adequate pain control with delivery of  infant  Interventions:  Pain assessment scores as ordered  Patient scores pain a \"3\" or less  Multidisciplinary care   Nonpharmacologic comfort measures        Interventions:   -   - See additional Care Plan goals for specific interventions  Outcome: Completed

## 2024-03-27 NOTE — L&D DELIVERY NOTE
Vasu Lopez [[id number]]      Labor Events     labor?: No   steroids?: None  Antibiotics received during labor?: No  Rupture date/time: 3/26/2024 1509     Rupture type: AROM  Fluid color: Clear  Labor type: Spontaneous Onset of Labor  Augmentation: Oxytocin, AROM  Indications for augmentation: Ineffective Contraction Pattern  Intrapartum & labor complications: Fetal tachycardia  Intrapartum & labor complications comment: Fetal tachycardia       Labor Event Times    Labor onset date/time: 3/26/2024 0030  Dilation complete date/time: 3/27/2024 0034  Start pushing date/time: 3/27/2024 014        Presentation    Presentation: Vertex  Position: Occiput Anterior       Operative Delivery    Operative Vaginal Delivery: No                Shoulder Dystocia    Shoulder Dystocia: No       Anesthesia    Method: Epidural              [place] Delivery      Head delivery date/time: 3/27/2024 03:46:13   Delivery date/time:  3/27/24 03:46:29   Delivery type: Normal spontaneous vaginal delivery    Details:     Delivery location: delivery room       Delivery Providers    Delivering Clinician: Yolis Gamboa MD   Delivery personnel:  Provider Role   ORaegan Atkins, RN Baby Nurse   Kavya Dunlap RN Delivery Nurse             Cord    Vessels: 3 Vessels  Complications: None  Timed cord clamping: Yes  Time in sec: 30  Cord blood disposition: to lab  Gases sent?: No       Resuscitation    Method: None        Measurements      Weight: 3800 g 8 lb 6 oz Length: 53.3 cm     Head circum.: 34.5 cm Chest circum.: 34 cm      Abdominal circum.: 34 cm           Placenta    Date/time: 3/27/2024 0354  Removal: Spontaneous  Appearance: Intact  Disposition: Pathology       Apgars    Living status: Living   Apgar Scoring Key:    0 1 2    Skin color Blue or pale Acrocyanotic Completely pink    Heart rate Absent <100 bpm >100 bpm    Reflex irritability No response Grimace Cry or active withdrawal    Muscle tone  Limp Some flexion Active motion    Respiratory effort Absent Weak cry; hypoventilation Good, crying              1 Minute:  5 Minute:  10 Minute:  15 Minute:  20 Minute:      Skin color: 0  1       Heart rate: 2  2       Reflex irritablity: 2  2       Muscle tone: 2  2       Respiratory effort: 2  2       Total: 8  9          Apgars assigned by: CLIVE TRISTAN RN  Hayes disposition: with mother       Skin to Skin    Skin to skin initiated date/time: 3/27/2024 0346  Skin to skin with: Mother       Vaginal Count    Initial count RN: Kavya Dunlap RN  Initial count Tech: Juan Manuel Shanon OLMEDO   Vel   Ruben    Initial counts 11   0    Final counts 11   2    Final count RN: Kavya Dunlap RN  Final count MD: Yolis Gamboa MD       Lacerations    Episiotomy: None  Perineal lacerations: 2nd Repaired?: Yes     Periurethral laceration: bilateral Repaired?: Yes     Vaginal laceration?: No      Cervical laceration?: No    Clitoral laceration?: No    Quantitative blood loss (mL): 236            33 year old  at 39w6d presented in latent labor.     Pregnancy significant for seizure disorder on Keppra. No recent seizures. Ankylosing spondylitis (no medication), asthma, GERD, interstitial cystitis, pelvic floor dysfunction, pubic symphysis pain. Varicella non immune.     Augmentation with IV oxytocin & amniotomy. Epidural.     She progressed to complete. Pushed with good efforts. Delivered a viable male infant over an intact perineum via vertex presentation RODERICK position. Delayed cord clamping was performed. Cord blood obtained. IV oxytocin administered. Placenta expressed apparently intact. Uterine tone was fairly good. IM methergine & rectal misoprostol 1000 mcg given. Perineum inspected. Laceration(s): 2nd degree perineal laceration repaired with 3-0 vicryl under local anesthetic. Bilateral periurethral lacerations re-approximated with interrupted 3-0 vicryl under local anesthetic. See Delivery report for QBL or  EBL.      Yolis Gamboa MD

## 2024-03-27 NOTE — DISCHARGE INSTRUCTIONS
Nothing in the vagina for 6 weeks   No strenuous activity/exercise  May shower immediately. May take bath  Keep wound(s) clean and dry. Wash daily with warm water & soap. Do not scrub. Gently pat dry. May cover with clean gauze or pad if needed.     AVOID CONSTIPATION:   -Take Miralax one capful in water or juice each morning.  You can also take each evening iif needed.  -Take Fiber supplement along with Miralax as well.  -May also take milk of magnesia or Dulcolax over the counter if needing to have a BM more urgently than Miralax is providing    -Do NOT strain for bowel movements    Please call office if:  -fever 100.4 or higher    Please proceed to the Emergency Department at [facility] for any of the following:   -vaginal bleeding soaking greater than 1 pad per hour  -severe pelvic pain  -shortness of breath  -chest pain  -leg pain or swelling          Motrin 600 mg every 6 hours for pain  Tylenol 1,000 mg every 6 hours as needed for pain  Stool softener 100 mg every 12 hours

## 2024-03-28 VITALS
DIASTOLIC BLOOD PRESSURE: 59 MMHG | TEMPERATURE: 98 F | RESPIRATION RATE: 16 BRPM | OXYGEN SATURATION: 100 % | BODY MASS INDEX: 29 KG/M2 | WEIGHT: 188 LBS | HEART RATE: 68 BPM | SYSTOLIC BLOOD PRESSURE: 114 MMHG

## 2024-03-28 PROBLEM — Z34.90 PREGNANCY (HCC): Status: RESOLVED | Noted: 2024-03-26 | Resolved: 2024-03-28

## 2024-03-28 PROBLEM — O47.9 UTERINE CONTRACTIONS (HCC): Status: RESOLVED | Noted: 2024-03-26 | Resolved: 2024-03-28

## 2024-03-28 LAB
BASOPHILS # BLD AUTO: 0.06 X10(3) UL (ref 0–0.2)
BASOPHILS NFR BLD AUTO: 0.3 %
EOSINOPHIL # BLD AUTO: 0.13 X10(3) UL (ref 0–0.7)
EOSINOPHIL NFR BLD AUTO: 0.7 %
ERYTHROCYTE [DISTWIDTH] IN BLOOD BY AUTOMATED COUNT: 13.2 %
HCT VFR BLD AUTO: 33.4 %
HGB BLD-MCNC: 11.3 G/DL
IMM GRANULOCYTES # BLD AUTO: 0.2 X10(3) UL (ref 0–1)
IMM GRANULOCYTES NFR BLD: 1.1 %
LYMPHOCYTES # BLD AUTO: 2 X10(3) UL (ref 1–4)
LYMPHOCYTES NFR BLD AUTO: 11 %
MCH RBC QN AUTO: 30 PG (ref 26–34)
MCHC RBC AUTO-ENTMCNC: 33.8 G/DL (ref 31–37)
MCV RBC AUTO: 88.6 FL
MONOCYTES # BLD AUTO: 0.76 X10(3) UL (ref 0.1–1)
MONOCYTES NFR BLD AUTO: 4.2 %
NEUTROPHILS # BLD AUTO: 14.99 X10 (3) UL (ref 1.5–7.7)
NEUTROPHILS # BLD AUTO: 14.99 X10(3) UL (ref 1.5–7.7)
NEUTROPHILS NFR BLD AUTO: 82.7 %
PLATELET # BLD AUTO: 198 10(3)UL (ref 150–450)
RBC # BLD AUTO: 3.77 X10(6)UL
WBC # BLD AUTO: 18.1 X10(3) UL (ref 4–11)

## 2024-03-28 RX ORDER — LEVETIRACETAM 500 MG/1
500 TABLET ORAL EVERY MORNING
Qty: 7 TABLET | Refills: 0 | Status: SHIPPED | OUTPATIENT
Start: 2024-03-28 | End: 2024-04-04

## 2024-03-28 RX ORDER — LEVETIRACETAM 750 MG/1
1500 TABLET ORAL EVERY EVENING
Qty: 14 TABLET | Refills: 0 | Status: SHIPPED | OUTPATIENT
Start: 2024-03-28 | End: 2024-04-04

## 2024-03-28 NOTE — PROGRESS NOTES
Labor Analgesia Follow Up Note    Patient underwent epidural anesthesia for labor analgesia,    Placenta Date/Time: 3/27/2024  3:54 AM     Delivery Date/Time:: 3/27/2024   3:46 AM     /59 (BP Location: Left arm)   Pulse 68   Temp 98 °F (36.7 °C) (Oral)   Resp 16   Wt 85.3 kg (188 lb)   LMP 06/22/2023   SpO2 100%   Breastfeeding Yes   BMI 29.44 kg/m²     Assessment:  Patient seen and no apparent anesthesia related complications.    Thank you for asking us to participate in the care of your patient.

## 2024-03-28 NOTE — PROGRESS NOTES
Infant discharged home with Mom in infant car seat. ID bands matched. Discharge instructions given to Mom and signed instructions per understanding.

## 2024-03-28 NOTE — PLAN OF CARE
Problem: POSTPARTUM  Goal: Long Term Goal:Experiences normal postpartum course  Description: INTERVENTIONS:  - Assess and monitor vital signs and lab values.  - Assess fundus and lochia.  - Provide ice/sitz baths for perineum discomfort.  - Monitor healing of incision/episiotomy/laceration, and assess for signs and symptoms of infection and hematoma.  - Assess bladder function and monitor for bladder distention.  - Provide/instruct/assist with pericare as needed.  - Provide VTE prophylaxis as needed.  - Monitor bowel function.  - Encourage ambulation and provide assistance as needed.  - Assess and monitor emotional status and provide social service/psych resources as needed.  - Utilize standard precautions and use personal protective equipment as indicated. Ensure aseptic care of all intravenous lines and invasive tubes/drains.  - Obtain immunization and exposure to communicable diseases history.  3/28/2024 0840 by Sydnee Barton RN  Outcome: Adequate for Discharge  3/28/2024 0810 by Sydnee Barton, RN  Outcome: Progressing  Goal: Optimize infant feeding at the breast  Description: INTERVENTIONS:  - Initiate breast feeding within first hour after birth.   - Monitor effectiveness of current breast feeding efforts.  - Assess support systems available to mother/family.  - Identify cultural beliefs/practices regarding lactation, letdown techniques, maternal food preferences.  - Assess mother's knowledge and previous experience with breast feeding.  - Provide information as needed about early infant feeding cues (e.g., rooting, lip smacking, sucking fingers/hand) versus late cue of crying.  - Discuss/demonstrate breast feeding aids (e.g., infant sling, nursing footstool/pillows, and breast pumps).  - Encourage mother/other family members to express feelings/concerns, and actively listen.  - Educate father/SO about benefits of breast feeding and how to manage common lactation challenges.  - Recommend avoidance of  specific medications or substances incompatible with breast feeding.  - Assess and monitor for signs of nipple pain/trauma.  - Instruct and provide assistance with proper latch.  - Review techniques for milk expression (breast pumping) and storage of breast milk. Provide pumping equipment/supplies, instructions and assistance, as needed.  - Encourage rooming-in and breast feeding on demand.  - Encourage skin-to-skin contact.  - Provide LC support as needed.  - Assess for and manage engorgement.  - Provide breast feeding education handouts and information on community breast feeding support.   3/28/2024 0840 by Sydnee Barton RN  Outcome: Adequate for Discharge  3/28/2024 0810 by Sydnee Barton RN  Outcome: Progressing  Goal: Establishment of adequate milk supply with medication/procedure interruptions  Description: INTERVENTIONS:  - Review techniques for milk expression (breast pumping).   - Provide pumping equipment/supplies, instructions, and assistance until it is safe to breastfeed infant.  3/28/2024 0840 by Sydnee Barton RN  Outcome: Adequate for Discharge  3/28/2024 0810 by Sydnee Barton RN  Outcome: Progressing  Goal: Appropriate maternal -  bonding  Description: INTERVENTIONS:  - Assess caregiver- interactions.  - Assess caregiver's emotional status and coping mechanisms.  - Encourage caregiver to participate in  daily care.  - Assess support systems available to mother/family.  - Provide /case management support as needed.  3/28/2024 0840 by Sydnee Barton RN  Outcome: Adequate for Discharge  3/28/2024 0810 by Sydnee Barton RN  Outcome: Progressing

## 2024-03-28 NOTE — PROGRESS NOTES
PPD#1    Pt has no complaints, lochia minimal. Tolerating PO, voiding without difficulty.    Vitals:    24 0744   BP: 114/59   Pulse: 68   Resp: 16   Temp: 98 °F (36.7 °C)     Recent Labs   Lab 24  1339 24  0821   RBC 4.06 3.77*   HGB 12.1 11.3*   HCT 36.0 33.4*   MCV 88.7 88.6   MCH 29.8 30.0   MCHC 33.6 33.8   RDW 13.1 13.2   NEPRELIM 11.66* 14.99*   WBC 14.6* 18.1*   .0 198.0         Gen: NAD, appears well  Uterus: firm, below umbilicus    33 year old  now PPD#1 s/p , uncomplicated  - AFVSS  - doing well  - Rh pos  - maternal hx epilepsy on keppra: neurology recommendation to reduce AM dose to 500mg, keep 1500mg in Pms. Then after 1 week stop AM dose and just take 1250mg nightly  - pt interested in discharge today - discussed discharge instructions and plan for follow up in 6 weeks

## 2024-03-30 ENCOUNTER — TELEPHONE (OUTPATIENT)
Dept: OBGYN UNIT | Facility: HOSPITAL | Age: 34
End: 2024-03-30

## 2024-03-30 NOTE — PROGRESS NOTES
Reviewed self and infant care w / mom, she verbalizes understanding of instructions reviewed. Encourage to follow up w/ MDs as directed and w/ questions/concerns. Already had ped appt, pumping reviewed, does c/o r sided abd pain occas, ins to void, has had bms, if continues then to contact OB fror advice, enc to join ct

## 2024-04-04 ENCOUNTER — TELEPHONE (OUTPATIENT)
Dept: OBGYN CLINIC | Facility: CLINIC | Age: 34
End: 2024-04-04

## 2024-04-04 RX ORDER — LEVETIRACETAM 750 MG/1
750 TABLET, FILM COATED, EXTENDED RELEASE ORAL NIGHTLY
Qty: 90 TABLET | Refills: 0 | Status: SHIPPED | OUTPATIENT
Start: 2024-04-04

## 2024-04-04 RX ORDER — LEVETIRACETAM 500 MG/1
500 TABLET, FILM COATED, EXTENDED RELEASE ORAL NIGHTLY
Qty: 90 TABLET | Refills: 0 | Status: SHIPPED | OUTPATIENT
Start: 2024-04-04

## 2024-04-04 NOTE — TELEPHONE ENCOUNTER
Spoke to patient regarding medication. She states since discharge she was advised to wean down to 1250 mg of Keppra at night. She was not prescribed the ER that she was expecting and requesting that refill now.     She did receive a short supply of regular levetiracetam from her OB provider.

## 2024-04-04 NOTE — TELEPHONE ENCOUNTER
Patient is calling requesting an extended release prescription for levetiracetam, patient delivered a week ago and her OB didn't did the extended release prescription. [id number] [address] AT [facility] [address], [phone], [phone]

## 2024-04-04 NOTE — TELEPHONE ENCOUNTER
Pt needs referral placed for lactation consultant.  Has appt scheduled next week, pt is hmo ins.     Future Appointments   Date Time Provider Department Center   4/10/2024  2:30 PM LACT[facility]2  [facility]   5/10/2024  2:30 PM Larisa David MD [facility] [address]   6/5/2024  3:30 PM Thaddeus Monge MD [facility] [place]

## 2024-04-10 ENCOUNTER — NURSE ONLY (OUTPATIENT)
Dept: LACTATION | Facility: HOSPITAL | Age: 34
End: 2024-04-10
Attending: OBSTETRICS & GYNECOLOGY
Payer: COMMERCIAL

## 2024-04-10 PROCEDURE — 99213 OFFICE O/P EST LOW 20 MIN: CPT

## 2024-04-14 ENCOUNTER — MOBILE ENCOUNTER (OUTPATIENT)
Dept: OBGYN CLINIC | Facility: CLINIC | Age: 34
End: 2024-04-14

## 2024-04-14 RX ORDER — AMOXICILLIN AND CLAVULANATE POTASSIUM 875; 125 MG/1; MG/1
1 TABLET, FILM COATED ORAL 2 TIMES DAILY
Qty: 20 TABLET | Refills: 0 | Status: SHIPPED | OUTPATIENT
Start: 2024-04-14 | End: 2024-04-24

## 2024-04-14 NOTE — PROGRESS NOTES
Returned patient's page.  Patient reports left-sided breast pain.  Small area but not erythematous.  Noted fever.  Is breast-feeding.  Reports has continued to breast-feed and pump per routine.  Discussed with patient possible risk of mastitis.  Recommend to continue breast-feeding and pumping.  Recommend NSAID therapy.  Recommend cold and warm compresses as needed.  Recommend antibiotics.  New prescription sent.  Patient agreeable.  All questions and concerns were addressed.  Precautions provided.    Larisa David MD   [facility] - OBGYN

## 2024-05-10 ENCOUNTER — POSTPARTUM (OUTPATIENT)
Dept: OBGYN CLINIC | Facility: CLINIC | Age: 34
End: 2024-05-10
Payer: COMMERCIAL

## 2024-05-10 ENCOUNTER — OFFICE VISIT (OUTPATIENT)
Dept: FAMILY MEDICINE CLINIC | Facility: CLINIC | Age: 34
End: 2024-05-10
Payer: COMMERCIAL

## 2024-05-10 VITALS
WEIGHT: 163.25 LBS | BODY MASS INDEX: 25.62 KG/M2 | HEIGHT: 67 IN | HEART RATE: 79 BPM | SYSTOLIC BLOOD PRESSURE: 100 MMHG | DIASTOLIC BLOOD PRESSURE: 68 MMHG

## 2024-05-10 VITALS
RESPIRATION RATE: 18 BRPM | DIASTOLIC BLOOD PRESSURE: 70 MMHG | HEIGHT: 67 IN | WEIGHT: 163 LBS | OXYGEN SATURATION: 98 % | HEART RATE: 78 BPM | TEMPERATURE: 98 F | BODY MASS INDEX: 25.58 KG/M2 | SYSTOLIC BLOOD PRESSURE: 120 MMHG

## 2024-05-10 DIAGNOSIS — J45.30 MILD PERSISTENT ASTHMA WITHOUT COMPLICATION (HCC): ICD-10-CM

## 2024-05-10 DIAGNOSIS — J45.30 MILD PERSISTENT ASTHMA WITHOUT COMPLICATION (HCC): Primary | ICD-10-CM

## 2024-05-10 DIAGNOSIS — N81.89 PELVIC FLOOR WEAKNESS: ICD-10-CM

## 2024-05-10 PROBLEM — Z34.03 PREGNANCY, FIRST, THIRD TRIMESTER (HCC): Status: RESOLVED | Noted: 2023-09-13 | Resolved: 2024-05-10

## 2024-05-10 PROBLEM — O26.899 PELVIC PAIN AFFECTING PREGNANCY (HCC): Status: RESOLVED | Noted: 2024-01-07 | Resolved: 2024-05-10

## 2024-05-10 PROBLEM — R10.2 PELVIC PAIN AFFECTING PREGNANCY (HCC): Status: RESOLVED | Noted: 2024-01-07 | Resolved: 2024-05-10

## 2024-05-10 PROCEDURE — 3074F SYST BP LT 130 MM HG: CPT | Performed by: STUDENT IN AN ORGANIZED HEALTH CARE EDUCATION/TRAINING PROGRAM

## 2024-05-10 PROCEDURE — 3078F DIAST BP <80 MM HG: CPT | Performed by: OBSTETRICS & GYNECOLOGY

## 2024-05-10 PROCEDURE — 3008F BODY MASS INDEX DOCD: CPT | Performed by: STUDENT IN AN ORGANIZED HEALTH CARE EDUCATION/TRAINING PROGRAM

## 2024-05-10 PROCEDURE — 99213 OFFICE O/P EST LOW 20 MIN: CPT | Performed by: STUDENT IN AN ORGANIZED HEALTH CARE EDUCATION/TRAINING PROGRAM

## 2024-05-10 PROCEDURE — 3078F DIAST BP <80 MM HG: CPT | Performed by: STUDENT IN AN ORGANIZED HEALTH CARE EDUCATION/TRAINING PROGRAM

## 2024-05-10 PROCEDURE — 3008F BODY MASS INDEX DOCD: CPT | Performed by: OBSTETRICS & GYNECOLOGY

## 2024-05-10 PROCEDURE — 3074F SYST BP LT 130 MM HG: CPT | Performed by: OBSTETRICS & GYNECOLOGY

## 2024-05-10 RX ORDER — ALBUTEROL SULFATE 90 UG/1
AEROSOL, METERED RESPIRATORY (INHALATION) EVERY 6 HOURS PRN
COMMUNITY
End: 2024-05-10

## 2024-05-10 RX ORDER — DESOGESTREL AND ETHINYL ESTRADIOL 0.15-0.03
1 KIT ORAL DAILY
Qty: 84 TABLET | Refills: 3 | Status: SHIPPED | OUTPATIENT
Start: 2024-05-10 | End: 2025-05-10

## 2024-05-10 RX ORDER — FLUTICASONE PROPIONATE AND SALMETEROL 100; 50 UG/1; UG/1
1 POWDER RESPIRATORY (INHALATION) 2 TIMES DAILY
Qty: 60 EACH | Refills: 0 | Status: SHIPPED | OUTPATIENT
Start: 2024-05-10

## 2024-05-10 RX ORDER — ALBUTEROL SULFATE 90 UG/1
2 AEROSOL, METERED RESPIRATORY (INHALATION) EVERY 4 HOURS PRN
Qty: 6.7 G | Refills: 3 | Status: SHIPPED | OUTPATIENT
Start: 2024-05-10

## 2024-05-10 NOTE — PROGRESS NOTES
Subjective:      Chief Complaint   Patient presents with    Cough     On and off since October, she had COVID and bronchitis - states at night its wheezing - some chest pressure - states it got worse since giving birth 6 weeks ago     HISTORY OF PRESENT ILLNESS  Cough  Associated symptoms include wheezing.     HPI obtained per patient report.  Shilpa Lopez is a pleasant 33 year old female presenting with a cough.     She has had an intermittent dry cough and wheezing since a Covid-19 associated bronchitis infection in 10/2023. She had ongoing intermittent symptoms during her pregnancy, but her symptoms have been worsening since her delivery about 6 weeks ago.     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    Extrinsic asthma, unspecified    sports induced    GERD (gastroesophageal reflux disease)    Hematuria    weird presentation of ibs    History of gross hematuria    History of recurrent UTIs    Interstitial cystitis    Irritable bowel    Knee pain    MRSA infection    Eye    Other and unspecified ovarian cysts    Pain in both knees, unspecified chronicity    Pelvic floor dysfunction    Pelvic inflammatory disease    PONV (postoperative nausea and vomiting)    nausea    Right ankle pain, unspecified chronicity    Right rotator cuff tendonitis    Seizure disorder ([facility])    Sees Dr. Lin- last seizure November 2015. seizures since around age 12 with events of LOC without shaking. In 2016 (age 25) she had another event of LOC with urinary incontinence without shaking. Was told EEG abnormal at this time. Started LEV and had no recurrent events of this type.     Past  Surgical History:   Procedure Laterality Date    Colonoscopy  02/01/2012    nl ti, nl colon, nl random colon bx.    Colonoscopy N/A 05/21/2018    Procedure: COLONOSCOPY;  Surgeon: Henry Carpio MD;  Location:  ENDOSCOPY    Other surgical history  03/30/2012    Cystoscopy - Dr. Vidal     Other surgical history  01/01/2012    Negative - ab. pain - resolved with amitriptyline    Other surgical history  05/16/2017    Cystoscopy - Dr Kruger    Other surgical history  06/07/2017    Cysto w low pressure hydro-distention, fulguration of Hunner's ulcer & inj kenalog    Tonsillectomy         CURRENT MEDICATIONS  Outpatient Medications Marked as Taking for the 5/10/24 encounter (Office Visit) with Loretta Pickard MD   Medication Sig Dispense Refill    fluticasone-salmeterol (ADVAIR DISKUS) 100-50 MCG/ACT Inhalation Aerosol Powder, Breath Activated Inhale 1 puff into the lungs 2 (two) times daily. 60 each 0    albuterol 108 (90 Base) MCG/ACT Inhalation Aero Soln Inhale 2 puffs into the lungs every 4 (four) hours as needed for Wheezing or Shortness of Breath. 6.7 g 3    levETIRAcetam  MG Oral Tablet 24 Hr Take 1 tablet (500 mg total) by mouth at bedtime. 90 tablet 0    levETIRAcetam  MG Oral Tablet 24 Hr Take 1 tablet (750 mg total) by mouth at bedtime. 90 tablet 0    Cholecalciferol (VITAMIN D) 1000 units Oral Tab Take 1,000 tablets by mouth daily.         HEALTH MAINTENANCE  Immunization History   Administered Date(s) Administered    Covid-19 Vaccine Pfizer 30 mcg/0.3 ml 02/04/2021, 02/24/2021    DTAP 09/11/1992, 11/02/1992, 01/05/1993, 12/30/1993, 07/25/1997    DTAP INFANRIX 09/11/1992, 11/02/1992, 01/05/1993, 12/30/1993, 07/25/1997    FLU VAC QIV SPLIT 3 YRS AND OLDER (83838) 10/14/2014, 01/04/2018, 10/03/2018    FLUZONE 6 months and older PFS 0.5 ml (92027) 10/14/2014, 11/08/2019    Flublok Quad Influenza Vaccine (02636) 10/02/2020, 10/02/2021    Flucelvax 0.5ml Vaccine 10/09/2023    HEP B 07/10/1992,  08/10/1992, 03/12/1993    HEP B Vaccine 07/10/1992, 08/10/1992, 03/12/1993    HEP B, Ped/Adol 07/10/1992, 08/10/1992, 03/12/1993    HIB 09/11/1992, 11/02/1992, 01/05/1993, 10/05/1993    Hib, Unspecified Formulation 09/11/1992, 11/02/1992, 01/05/1993, 10/05/1993    Influenza 10/28/1999, 01/02/2018    MMR 10/05/1993, 07/25/1997    OPV 09/11/1992, 11/02/1992, 01/05/1993, 12/30/1993, 07/25/1997    Pfizer Covid-19 Vaccine 30mcg/0.3ml 12yrs+ ([id number]) 10/09/2023    Pneumovax 23 10/03/2018    TD 03/02/2017    TDAP 01/04/2024    Tb Intradermal Test 08/11/2016       ALLERGIES AND DRUG REACTIONS  Allergies   Allergen Reactions    Amitriptyline FATIGUE, HALLUCINATION, OTHER (SEE COMMENTS), NAUSEA AND VOMITING and CONFUSION     Profound sleepiness.    Causes lethargy   Other reaction(s): FATIGUE   Strange dreams and sleeping too much   Profound sleepiness.    Severe fatique    Etanercept FATIGUE, OTHER (SEE COMMENTS) and NAUSEA AND VOMITING     Paralysis of leg   Other reaction(s): Weakness   Lower extremity weakness    Lower extremity weakness    Leg paralysis    Paralysis of leg   Other reaction(s): Weakness   Lower extremity weakness   Lower extremity weakness       Family History   Problem Relation Age of Onset    Hypertension Mother     Stroke Paternal Grandmother     Cancer Neg     Diabetes Neg     Lipids Neg     Heart Disorder Neg      Social History     Socioeconomic History    Marital status:     Number of children: 0   Occupational History    Occupation: [employer] -    Tobacco Use    Smoking status: Never    Smokeless tobacco: Never    Tobacco comments:     Job:   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[employer], commutes, 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[employer]    Vaping Use    Vaping status: Never Used   Substance and Sexual Activity    Alcohol use: Not Currently     Comment: 2 a day when healthy    Drug use: No     Comment: Pt has medical card for use    Sexual activity: Yes     Partners: Male   Other Topics  Concern    Caffeine Concern Yes     Comment: 1/2 coffee a couple times a week    Exercise No     Social Determinants of Health     Financial Resource Strain: Low Risk  (3/26/2024)    Financial Resource Strain     Difficulty of Paying Living Expenses: Not hard at all     Med Affordability: No   Food Insecurity: No Food Insecurity (3/26/2024)    Food Insecurity     Food Insecurity: Never true   Transportation Needs: No Transportation Needs (3/26/2024)    Transportation Needs     Lack of Transportation: No   Stress: No Stress Concern Present (3/26/2024)    Stress     Feeling of Stress : No   Housing Stability: Low Risk  (3/26/2024)    Housing Stability     Housing Instability: No       Review of Systems   Respiratory:  Positive for cough and wheezing.    All other systems reviewed and are negative.         Objective:      /70   Pulse 78   Temp 97.7 °F (36.5 °C) (Temporal)   Resp 18   Ht 5' 7\" (1.702 m)   Wt 163 lb (73.9 kg)   LMP 06/22/2023   SpO2 98%   Breastfeeding No   BMI 25.53 kg/m²   Body mass index is 25.53 kg/m².    Physical Exam  Vitals reviewed.   Constitutional:       General: She is not in acute distress.     Appearance: She is not ill-appearing, toxic-appearing or diaphoretic.   HENT:      Head: Normocephalic and atraumatic.   Neck:      Thyroid: No thyroid mass, thyromegaly or thyroid tenderness.   Cardiovascular:      Rate and Rhythm: Normal rate and regular rhythm.      Heart sounds: Normal heart sounds.   Pulmonary:      Effort: Pulmonary effort is normal.      Breath sounds: Normal breath sounds.   Abdominal:      General: There is no distension.   Musculoskeletal:      Cervical back: Neck supple. No tenderness.   Lymphadenopathy:      Cervical: No cervical adenopathy.   Neurological:      Mental Status: She is alert and oriented to person, place, and time.            Assessment and Plan:      1. Mild persistent asthma without complication (HCC) (Primary)  -     Fluticasone-Salmeterol;  Inhale 1 puff into the lungs 2 (two) times daily.  Dispense: 60 each; Refill: 0  -     Albuterol Sulfate HFA; Inhale 2 puffs into the lungs every 4 (four) hours as needed for Wheezing or Shortness of Breath.  Dispense: 6.7 g; Refill: 3    No follow-ups on file.    - ACT score of 17, indicating inadequate asthma symptom control  - recommended advair inhaler as prescribed  - continue albuterol inhaler as needed  - we discussed that the goal for good asthma symptom control is <2 episodes of symptoms per week requiring her rescue inhaler and <1 episode of overnight symptoms per week. If she is not meeting this goal 1-2 weeks after starting the advair inhaler, recommend follow-up for regimen adjustment    Patient verbalized understanding of assessment and recommendations. All questions and concerns were addressed.    Electronically signed by Loretta Pickard MD

## 2024-05-10 NOTE — PATIENT INSTRUCTIONS
Instructions for Birth Control Pill Use    The birth control pill works primarily by blocking ovulation (release of an egg).  If there is no egg to meet the sperm, pregnancy cannot occur.  The pill also works by making cervical mucous thick and unreceptive to sperm, slowing tubal function which has to move the egg down the tube to meet the sperm.    For women who follow these directions carefully, the pill is an effective reversible contraceptive currently available.    Starting birth control pills for the first time  1). Choose a backup method of birth control (such as condoms, diaphragm or foam) to use with your first pack of pills because the pill may not fully protect you from pregnancy during the first two weeks.  Keep this backup method handy and use it in case you:  Run out of pills  Forget to take your pill  Discontinue pill use  The use of condoms is ALWAYS encouraged to protect against sexually transmitted diseases, if applicable.   2). There are several ways to start taking your pills. Use one of the following approaches:  First day of period start: Start your first pack of pills on the day of your period. No back-up method is required  Sunday start: Start your first pack of pills on the first Sunday after your period begins.  This will result in your menses almost always beginning on a Tuesday or a Wednesday every 4 weeks.  You will need a backup method for 14 days.  Quick Start: Start immediately if pregnancy is excluded. You will need a back-up method for 14 days.  Quick start will cause your period to be irregular.  3). Take one pill a day until you finish the pack.   If you are using a 28-day pack, begin a new pack immediately. Skip no days between packages  If you are using a 21-day pack, stop taking pills for 1 week and then start your new pack   4). Try to associate taking your pill with something you do around the same time every day, like brushing your teeth in the morning, eating a meal or  going to bed.  Establishing a routine will make it easier for you to remember. The pill works best if you take it about the same time every day.    Continuing on the pills ~ What if……  1). If you have bleeding between periods  This is a very common side effect of birth control pills for the first 3 months.  Spotting (light bleeding between periods) can also occur if you miss a pill.  Call the doctor’s office for advice if bleeding is heavier than 1 pad an hour or the bleeding between periods last for more than 3 months  2). If you have nausea or vomiting  Nausea and vomiting may occur during the first few months of taking birth control pills.  Sometimes by changing the time of the day when you take the pill will help.  Try switching to dinner time or before bed.  If you had episodes of vomiting within 2 hours of taking your pill, please use a back-up plan for the rest of the cycle because your body may not absorbed the pill.  Contact your doctor’s office if you have persistent nausea and vomiting.  3). If you miss your period  It is not uncommon for your periods to become lighter while taking birth control pills or miss you period all together.  If you missed any pills in the pack prior to this occurring, you should take a home pregnancy test prior to starting a new pack.  4). If you forget your pills for a day or two follow the instructions below:  If you miss a pill, take the forgotten one (yesterday’s pill) as soon as you remember it and take today’s pill at the regular time.  Although you probably will not become pregnant, use a back-up method until your next period.  If you miss two pills in a row, take two pills as soon as you remember and two pills the next day.  You may have some spotting and nausea.  Please use a back-up method until your next period.  If you miss three or more pills in a row, do not take all 3 pills at once.  If you are in the third week of pills, finish the pack and skip the inactive  pills and start a new pack.  Start your backup method of birth control immediately.  You are at risk for becoming pregnant if you do not use a backup contraception.    Remember, missed pills may cause you to start spotting or bleeding for about 7 days.    5). If you experience breast soreness, mild headaches, mild edema (swelling)  These symptoms are usually mild and will improve after being on the pill for 3 or more months.  If any of these symptoms are severe or persist more than 3 months, you should contact our office.  6). If you experience mood swings or changes  Usually, after you adjust to the hormones, these symptoms will improve.  If at any time these symptoms are severe or persistent, you should contact our office.    7). If your doctor has you on continuous pills (No 7 day break after 21 days of active pills) in order to suppress your menses because of endometriosis or premenstrual syndrome, you will likely have break through bleeding.  If the spotting persists through more than 3 packs of pills, contact your doctor to confirm that you should stay on that brand of pills    8). If you need to take any other medications, including antibiotics, check with the pharmacist to see if there will be any interaction or if it will make your birth control pill less effective.      When to contact your provider  If you are having severe abdominal pain  Chest pain and shortness of breath  Severe Headaches  Blurred Vision or Vision Loss  Severe leg pain- calf or thigh    If you have additional questions or concerns, please call us at [phone]

## 2024-05-13 RX ORDER — FLUTICASONE PROPIONATE AND SALMETEROL 100; 50 UG/1; UG/1
1 POWDER RESPIRATORY (INHALATION) 2 TIMES DAILY
Qty: 180 EACH | Refills: 0 | OUTPATIENT
Start: 2024-05-13

## 2024-05-13 NOTE — TELEPHONE ENCOUNTER
Requested Renewals     Name from pharmacy: FLUTICASONE/SALM DISK 100/50MCG 60S         Will file in chart as: FLUTICASONE-SALMETEROL 100-50 MCG/ACT Inhalation Aerosol Powder, Breath Activated     Possible duplicate: Rafal to review recent actions on this medication    Sig: INHALE 1 PUFF INTO THE LUNGS TWICE DAILY    Disp: 180 each    Refills: 0 (Pharmacy requested: Not specified)    Start: 5/10/2024    Class: Normal    Non-formulary For: Mild persistent asthma without complication (HCC)    To pharmacy: **Patient requests 90 days supply**    Last ordered: 3 days ago (5/10/2024) by Loretta Pickard MD    Last refill: 5/10/2024    Rx #: [id number]    Asthma & COPD Medication Protocol Teickp44/10/2024 12:41 PM   Protocol Details Asthma Action Score greater than or equal to 20    Appointment in past 6 or next 3 months    AAP/ACT given in last 12 months             Future Appointments   Date Time Provider Department Center   6/5/2024  3:30 PM Thaddeus Monge MD [facility] [place]     LOV: 5/10/24    Last refill given on 5/10/24 for #60  This RX denied.

## 2024-05-20 ENCOUNTER — TELEPHONE (OUTPATIENT)
Dept: OBGYN CLINIC | Facility: CLINIC | Age: 34
End: 2024-05-20

## 2024-05-20 NOTE — TELEPHONE ENCOUNTER
Pt dropped off STD forms, QUINN completed, $25 fee paid. Emailed and internal mailed forms to Forms Dept.

## 2024-05-22 NOTE — TELEPHONE ENCOUNTER
Valid authorization for [facility] has been scanned into [employer] from 5/21/2024.     Release of Information has been signed on 5/16/2024.

## 2024-05-22 NOTE — TELEPHONE ENCOUNTER
Vadim Cantu, Dr. Gamboa -     Please sign off on this form if you agree to:      Short Term Disability -  Maternity Leave       Starting: 3/26/2024 (Delivery date on 3/27/2024)       Ending: pending recovery - 6 weeks vaginal     (place your signature on the first page only)   -From your Inbasket, Highlight the patient and click \"Chart\"   -Double click the 5/20/2024 Forms Completion telephone encounter  -Scroll down to the Media section   -Click the blue Hyperlinks:   Short Term Disability / Dr. Gamboa / 5-  -Click Acknowledge located in the top right ribbon/menu (it  has been moved)  -Drag the mouse into the blank space of the document and a + sign will   appear. Left click to electronically sign the document.     Thank you for your time and assistance!     Guill[facility]ina Donato Department

## 2024-05-24 NOTE — TELEPHONE ENCOUNTER
Short term disability forms have been faxed successfully - received confirmation - and I have sent patient a [employer] message.

## 2024-05-24 NOTE — TELEPHONE ENCOUNTER
Short term disability forms have been faxed to [facility] - fax #: [phone], and now, awaiting a fax confirmation.

## 2024-06-05 ENCOUNTER — TELEMEDICINE (OUTPATIENT)
Dept: NEUROLOGY | Facility: CLINIC | Age: 34
End: 2024-06-05
Payer: COMMERCIAL

## 2024-06-05 DIAGNOSIS — Z79.899 ENCOUNTER FOR LONG-TERM (CURRENT) DRUG USE: ICD-10-CM

## 2024-06-05 DIAGNOSIS — R56.9 CONVULSIONS, UNSPECIFIED CONVULSION TYPE (HCC): Primary | ICD-10-CM

## 2024-06-05 PROCEDURE — 99214 OFFICE O/P EST MOD 30 MIN: CPT | Performed by: OTHER

## 2024-06-05 NOTE — PROGRESS NOTES
Neurology VIDEO History & Physical     ASSESSMENT & PLAN:      ICD-10-CM    1. Convulsions, unspecified convulsion type (HCC)  R56.9       2. Encounter for long-term (current) use of high-risk medication  Z79.899       3. Encounter for long-term (current) drug use  Z79.899 Levetiracetam, Serum        Generalized epilepsy, she is post partum now, has been well controlled for more than a year.  Continue LEV, check level.    We discussed medication side effects and activity precautions.  We discussed symptoms that would warrant urgent/emergent evaluation. Patient verbalized understanding and agreement.    This visit was conducted as a two-way, real-time, interactive synchronous Video encounter.   The patient consents to both scheduling and proceeding with this virtual encounter, and understands this visit may be billed to their insurance carrier.    This has been done in good adam to provide continuity of care in the best interest of the provider-patient relationship.  There are limitations of this visit, especially in physical examination, though every conscious effort was taken to allow for as appropriate care, time, and medical decision making as reasonably possible.      Return in about 6 months (around 12/5/2024).     ~~~~~~~~~~~~~~~~~~~~~~~~~~~    CHIEF COMPLAINT / REASON FOR VISIT:    Chief Complaint   Patient presents with    Seizures     HISTORY OBTAINED FROM:  Patient and others as above  Data review (see below)    HISTORY OF PRESENT ILLNESS:  Shilpa Lopez is a 33 year old  female with seizures since around age 12 with events of LOC without shaking.  In 2016 (age 25) she had another event of LOC with urinary incontinence without shaking.  Was told EEG abnormal at this time.  Started LEV and had no recurrent events of this type.    Feb 2023 she had a different event where she felt hot, disoriented, \"stomach dropped\" but did not lose consciousness.  This lasted a few minutes.  These symptoms do not occur during  other events.      No other staring events.    She was pregnant, delivered 3/27/24.  During pregnancy, Dr. Baker has been gradually increasing LEV - up to 4049-8321 mg daily (was on 750 mg prior to Feb 2023 seizure, was increased from 1000 to 1250 due to low level but by July 2023 was already pregnant).    No side effects, mood is stable.    INTERIM HISTORY:  Delivered her baby 3/27/24.  Back to LEV ER 1250 mg HS.  No seizures, no complaints.  Tolerating LEV.    Driving status:  Driving    Previous medication trials:  None    Epilepsy risk factors:  Normal birth and development   No febrile or childhood seizures   No meningitis/encephalitis (had LP as an infant for fever, but was ok)  No head trauma with prolonged LOC/amnesia   No known structural brain lesions    DATA REVIEWED:  As documented in the HPI    May 2024  PCP note    March 2024  OB note    LEV levels  Feb 5 2024 11  Dec 22 2023 10.8  Dec 4 2023 6.8  Nov 22 2023 4.4  Oct 20 2023 10  Jul 19 2023 12  Mar 6 2023 6.4    Nov 2023, Jul 2023, Mar 2023  Neuro note (Samuel / Delilah)    Feb 2023  MRI brain unremarkable     Jan 2023  EEG unremarkable     2016   MRI brain unremarkable   EEG 3 Hz gen spike and wave    PHYSICAL EXAMINATION:  LMP 05/08/2024         NEUROLOGICAL FAMILY HISTORY:  No seizures    PAST MEDICAL HISTORY:  Past Medical History:    Ankylosing spondylitis (HCC)    Ankylosing spondylitis of lumbosacral region (HCC)    Asthma (HCC)    Carrier of muscular dystrophy    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, aware and partner to be tested (results came back after transfer but from previous practice)- spouse is not but 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 Lin- last seizure November 2015. seizures since around age 12 with events of LOC without shaking. In 2016 (age 25) she had another event of LOC with urinary incontinence without shaking. Was told EEG abnormal at this time. Started LEV and had no recurrent events of this type.       PAST SURGICAL HISTORY:  Past Surgical History:   Procedure Laterality Date    Colonoscopy  02/01/2012    nl ti, nl colon, nl random colon bx.    Colonoscopy N/A 05/21/2018    Procedure: COLONOSCOPY;  Surgeon: Henry Carpio MD;  Location:  ENDOSCOPY    Other surgical history  03/30/2012    Cystoscopy - Dr. Vidal     Other surgical history  01/01/2012    Negative - ab. 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    Causes lethargy   Other reaction(s): FATIGUE   Strange dreams and sleeping too much   Profound sleepiness.    Severe fatique    Etanercept FATIGUE, OTHER (SEE COMMENTS) and NAUSEA AND VOMITING     Paralysis of leg   Other reaction(s): Weakness   Lower extremity weakness    Lower extremity weakness    Leg paralysis    Paralysis of leg   Other reaction(s): Weakness   Lower extremity weakness   Lower extremity weakness       CURRENT MEDICATIONS:  No outpatient medications have been marked as taking for the 6/5/24 encounter (Telemedicine) with Thaddeus Monge MD.         Thaddeus Monge MD, FAES, FAAN  Board-Certified in Neurology, Epilepsy, and Clinical Neurophysiology  [facility]

## 2024-06-24 ENCOUNTER — PATIENT MESSAGE (OUTPATIENT)
Dept: OBGYN CLINIC | Facility: CLINIC | Age: 34
End: 2024-06-24

## 2024-06-24 ENCOUNTER — TELEPHONE (OUTPATIENT)
Dept: OBGYN CLINIC | Facility: CLINIC | Age: 34
End: 2024-06-24

## 2024-06-24 NOTE — TELEPHONE ENCOUNTER
Per Dr. David patient needing [facility] information. Provided via [facility].    Report of depression, pt. denies. pt. reports of here because she needs help with .  Pt. denies si/hi.ah

## 2024-06-25 DIAGNOSIS — J45.30 MILD PERSISTENT ASTHMA WITHOUT COMPLICATION (HCC): ICD-10-CM

## 2024-06-26 ENCOUNTER — TELEPHONE (OUTPATIENT)
Age: 34
End: 2024-06-26

## 2024-06-26 RX ORDER — LEVETIRACETAM 750 MG/1
750 TABLET, FILM COATED, EXTENDED RELEASE ORAL NIGHTLY
Qty: 90 TABLET | Refills: 3 | Status: SHIPPED | OUTPATIENT
Start: 2024-06-26

## 2024-06-26 RX ORDER — LEVETIRACETAM 500 MG/1
500 TABLET, FILM COATED, EXTENDED RELEASE ORAL NIGHTLY
Qty: 90 TABLET | Refills: 3 | Status: SHIPPED | OUTPATIENT
Start: 2024-06-26

## 2024-06-26 NOTE — TELEPHONE ENCOUNTER
Medication: Levetiracetam 500 & 750 mg     Date of last refill: 04/04/2024 (#90/0)  [place] n[place]a     Last office visit: 06/05/2024-Telemedicine  Due back to clinic per last office note:  6 months  Date next office visit scheduled:    Future Appointments   Date Time Provider Department Center   7/31/2024  3:45 PM Sydnee, Kassandra, PT PF PT [place]   8/6/2024  3:45 PM Sydnee, Kassandra, PT PF PT [place]   8/13/2024  3:45 PM Sydnee, Kassandra, PT PF PT [place]   8/27/2024  5:15 PM Sydnee, Kassandra, PT PF PT [place]   9/3/2024  5:15 PM Sydnee, Kassandra, PT PF PT [place]   9/10/2024  5:15 PM Sydnee, Kassandra, PT PF PT [place]   9/17/2024  5:15 PM Sydnee, Kassandra, PT PF PT [place]   9/24/2024  5:15 PM Sydnee, Kassandra, PT PF PT [place]   10/1/2024  5:15 PM Sydnee, Kassandra, PT PF PT [place]   10/8/2024  5:15 PM Sydnee, Kassandra, PT PF PT [place]           Last OV note recommendation:    Generalized epilepsy, she is post partum now, has been well controlled for more than a year.  Continue LEV, check level.     We discussed medication side effects and activity precautions.  We discussed symptoms that would warrant urgent/emergent evaluation. Patient verbalized understanding and agreement.

## 2024-06-26 NOTE — TELEPHONE ENCOUNTER
Patient needs a refill on levETIRAcetam  MG Oral Tablet 24 Hr and levETIRAcetam  MG Oral Tablet 24 Hr and send to       [id number] - [place], [place] - [address] AT [facility] [address], [phone], [phone] [address] [facility] [address]   Phone: [phone] Fax: [phone]   Hours: Not open 24 hours

## 2024-06-27 RX ORDER — FLUTICASONE PROPIONATE AND SALMETEROL 100; 50 UG/1; UG/1
1 POWDER RESPIRATORY (INHALATION) 2 TIMES DAILY
Qty: 60 EACH | Refills: 0 | Status: SHIPPED | OUTPATIENT
Start: 2024-06-27

## 2024-06-27 NOTE — TELEPHONE ENCOUNTER
Requesting     Disp Refills Start End     fluticasone-salmeterol (ADVAIR DISKUS) 100-50 MCG/ACT Inhalation Aerosol Powder, Breath Activated 60 each 0 5/10/2024 --    Sig - Route: Inhale 1 puff into the lungs 2 (two) times daily. - Inhalation    Sent to pharmacy as: Fluticasone-Salmeterol 100-50 MCG/ACT Inhalation Aerosol Powder Breath Activated (Advair Diskus)      LOV: 5/10/2024 for cough  - ACT score of 17, indicating inadequate asthma symptom control  - recommended advair inhaler as prescribed  - continue albuterol inhaler as needed  - we discussed that the goal for good asthma symptom control is <2 episodes of symptoms per week requiring her rescue inhaler and <1 episode of overnight symptoms per week. If she is not meeting this goal 1-2 weeks after starting the advair inhaler, recommend follow-up for regimen adjustment  RTC: prn    Filled:     Dispensed Written Strength Quantity Refills Days Supply Provider Pharmacy    FLUTICASONE/SALM DISK 100/50MCG 60S 05/11/2024 05/10/2024  60 each  30 Loretta Pickard MD [facility]..       Future Appointments   Date Time Provider Department Center   7/31/2024  3:45 PM Sydnee, Kassandra, PT PF PT [place]   8/6/2024  3:45 PM Sydnee, Kassandra, PT PF PT [place]   8/13/2024  3:45 PM Sydnee, Kassandra, PT PF PT [place]   8/27/2024  5:15 PM Sydnee, Kassandra, PT PF PT [place]   9/3/2024  5:15 PM Sydnee, Kassandra, PT PF PT [place]   9/10/2024  5:15 PM Sydnee, Kassandra, PT PF PT [place]   9/17/2024  5:15 PM Sydnee, Kassandra, PT PF PT [place]   9/24/2024  5:15 PM Sydnee, Kassandra, PT PF PT [place]   10/1/2024  5:15 PM Sydnee, Kassandra, PT PF PT [place]   10/8/2024  5:15 PM Sydnee, Kassandra, PT PF PT [place]     Rx sent

## 2024-07-31 ENCOUNTER — APPOINTMENT (OUTPATIENT)
Dept: PHYSICAL THERAPY | Age: 34
End: 2024-07-31
Attending: OBSTETRICS & GYNECOLOGY
Payer: COMMERCIAL

## 2024-08-05 ENCOUNTER — TELEPHONE (OUTPATIENT)
Dept: PHYSICAL THERAPY | Facility: HOSPITAL | Age: 34
End: 2024-08-05

## 2024-08-06 ENCOUNTER — OFFICE VISIT (OUTPATIENT)
Dept: PHYSICAL THERAPY | Age: 34
End: 2024-08-06
Attending: OBSTETRICS & GYNECOLOGY
Payer: COMMERCIAL

## 2024-08-06 PROCEDURE — 97112 NEUROMUSCULAR REEDUCATION: CPT

## 2024-08-06 PROCEDURE — 97162 PT EVAL MOD COMPLEX 30 MIN: CPT

## 2024-08-06 NOTE — PATIENT INSTRUCTIONS
COORDINATING THE PELVIC FLOOR AND BREATHING DIAPHRAGM      Learning to coordinate and contract the pelvic floor with exhalation is a practical technique for bladder control. It is useful to contract the pelvic floor during exhalation which occurs during coughing, sneezing and laughing.  For continence the pelvic floor muscles should be trained to contract when you are exhaling.      FUNCTIONAL BREATHING EXERCISE  Focus on the relationship between your breathing diaphragm and the pelvic floor/ lower abdomen muscles.  As you breathe in, let the pelvic floor relax.  As you exhale, tighten and contract the pelvic floor as if the muscles are helping the air leave your lungs.              Start by lying on your back or reclining in a chair in a relaxed position. Place one hand on your chest and the other on your belly.   Relax your jaw by placing your tongue on the roof of your mouth so that your teeth are not touching.   Take a breath in through your nose, letting the abdomen and ribs expand and rise while you keep your upper chest relaxed.  Contract the pelvic floor (closing of rectum/ vagina) and lower abdomen muscles for an audible 5 count as you exhale through your mouth.  Practice coordinating the muscles for 2-3 minutes, or 10 reps. 1-2x/ day  Option to prop legs on 55 cm Exercise ball and/ or place 1-2 pillows under pelvis

## 2024-08-06 NOTE — PROGRESS NOTES
MUSCULOSKELETAL AND PELVIC FLOOR EVALUATION:     Diagnosis:   Pelvic floor weakness (N81.89)      Referring Provider: Larisa David  Date of Evaluation:    2024    Precautions:    Drug Allergy  Bring inhaler (asthma)  Seizures - controlled Next MD visit:   none scheduled  Date of Surgery: n/a     PATIENT SUMMARY   Shilpa Lopez is a 34 year old female who presents to therapy today with complaints of vaginal pressure almost like something wants to come out; symptoms increased with going on a walk & with exercise (light lifting 5#, exercise bike); needs pillow between knees to sleep & can wake up in middle of night due to soreness; 1 very brief episode 1 min of vaginal numbness after bike- this has only happened once; having a hard time keeping a tampon in - comes down.    Pt describes pain level: discomfort more than pain typically no more than 5/10. +LBP  Pregnant Now: No  Obstetrical/Gynecological history: baby Finn: 3/27/2024; ; felt relief from pressure immediately after delivery. Vaginal delivery, pushed for 2 hours. Mild tearing. Normal menses. Formula feeding   Occupation/Activities: going back to work next Wed:   PFDI-20: 87.5/300; Impairment= 29.1666 %  PFDI 20    Scores   POPDI 6:    33.33   CRAD 8:    25   HANNAH 6:    29.17   Summary:    87.5      Shilpa describes prior level of function: hx LBP (spring 2018 dx ankylosing spondylitis); IC (cauterized hunner's lesions) - currently controlled (diet). Pt goals include get back to working out.  Past medical history was reviewed with Shilpa. Significant findings include  has a past medical history of Ankylosing spondylitis ([facility]), Ankylosing spondylitis of lumbosacral region (HCC) (2019), Asthma ([facility]), Carrier of muscular dystrophy (2023), Extrinsic asthma, unspecified, GERD (gastroesophageal reflux disease), Hematuria, History of gross hematuria (), History of recurrent UTIs (), Interstitial cystitis, Irritable  bowel, Knee pain, MRSA infection (02/2012), Other and unspecified ovarian cysts, Pain in both knees, unspecified chronicity, Pelvic floor dysfunction (02/06/2024), Pelvic inflammatory disease, PONV (postoperative nausea and vomiting), Right ankle pain, unspecified chronicity, Right rotator cuff tendonitis, and Seizure disorder (HCC) (02/2016).    URINARY HABITS  Types of symptoms: other: none  other than some mild increased urinary urgency associated with IC  Vaginal Pressure complaints: yes  Urinary Frequency: WNL  Urine Stream: WNL  Amount: WNL  Leaking occurs: no  Nocturia: no  Fluid Intake: WNL    BOWEL HABITS  Types of symptoms: other none ; no fecal urgency  Frequency of bowel movements: 1x/ day  Stool consistency: Ramsey Stool Scale: 4  Do you strain with defecation: No   Laxative use: No    SEXUAL HEALTH STATUS  Sexual [place] Status: active  Pain with initial and/or deep penetration: no    ASSESSMENT  Shilpa presents to physical therapy evaluation with primary c/o vaginal pressure, LBP. The results of the objective tests and measures show internal impairments of absent involuntary contraction & relaxation, min anterior wall/ posterior wall/ apical tissue laxity, varied restriction & pain to internal pelvic musculature; additional objective measures to be completed at subsequent visit including external measures. Functional deficits include but are not limited to challenge with wearing tampons; needing pillow between knees to sleep at night; walking & exercise without pressure symptoms. Signs and symptoms are consistent with diagnosis of Pelvic floor weakness (N81.89). Pt and PT discussed evaluation findings, pathology, POC and HEP. Pt voiced understanding and performs HEP correctly without reported pain. Skilled Pelvic Physical Therapy is medically necessary to address the above impairments and reach functional goals.    OBJECTIVE:   Plan for external measures subsequent visit, limited 2/2  time    Informed consent for internal pelvic evaluation given: Yes    External Observation:   Voluntary contraction: present   Voluntary relaxation: present  Involuntary contraction: absent  Involuntary relaxation: present    Mons pubis: WNL  Labia majora: WNL  Labia minora: WNL  Urethral meatus: WNL  Introitus: WNL  Perineal body: WNL  ^vulvar tissues consistent with possible atrophic changes/ dehydration    Sensory/Reflex:  Vestibule: normal bilaterally    Internal Examination   Pelvic Floor Muscle strength: (PERF= Power/Endurance/Reps/Fast) MMT: 4/10/5/10+  Accessory Muscle Use: none    Tissue Laxity Test:  Anterior Wall: Min  Posterior Wall: Min  Apical: Min    Internal Palpation: WNL except Deep Transverse Perineal B minimal restriction and pain  Pubococcygeus B minimal restriction and pain  Obturator Internus B moderate restriction and pain  Pelvic Clock 6' clock introitus minimal restriction and pain    Today's Treatment and Response:   Patient education was provided on objective findings of internal evaluation and expectations with treatment outcomes. Educated on adequate hydration levels and coordination of diaphragmatic breathing and pelvic floor contraction . May benefit from standing PFM exam including with movement at a later date. Discussed connection between pt's presentation/ findings today & limited tolerance to wearing tampons/ pressure symptoms.    Patient was instructed in and issued a HEP for: Coordinating PFM with Breathing    Charges: PT Eval Moderate Complexity, Neuro X 1      Total Timed Treatment: 10 min     Total Treatment Time: 45 min     Based on clinical rationale and outcome measures, this evaluation involved Moderate Complexity decision making due to 3+ personal factors/comorbidities, 3 body structures involved/activity limitations, and evolving symptoms including pelvic pain and pressure  PLAN OF CARE:    Goals: (to be met in 10 visits)  Patient demonstrates efficiency with Levator  Ani/ Transverse Abdominis (Pelvic Brace) contraction for ability to resume desired exercise.  Patient consistently demonstrates Levator Ani contraction inverse to diaphragmatic breathing to allow for pelvic brace with ADLs without valsalva for reduced pressure symptoms with walking.  Patient presents with resolved myofascial pelvic floor soft tissue restrictions & pain for reduced discomfort including LBP with ability to sleep at night without disruption.  Patient reports the ability to keep tampons in without falling out.  Patient understands importance of performing HEP to prevent reoccurrence of symptoms.     Frequency / Duration: Patient will be seen for 1-2 x/week or a total of 10 visits over a 90 day period. Treatment will include: Manual Therapy, Neuromuscular Re-education, Self-Care Home Management, Therapeutic Activities, Therapeutic Exercise, and Home Exercise Program instruction     Education or treatment limitation: None  Rehab Potential:excellent    Patient/Family/Caregiver was advised of these findings, precautions, and treatment options and has agreed to actively participate in planning and for this course of care.    Thank you for your referral. Please co-sign or sign and return this letter via fax as soon as possible to [phone]. If you have any questions, please contact me at Dept: [phone]    Sincerely,  Electronically signed by therapist: Kassandra Randhawa PT    Physician's certification required: Yes  I certify the need for these services furnished under this plan of treatment and while under my care.    X___________________________________________________ Date____________________    Certification From: 8/6/2024  To:11/4/2024

## 2024-08-12 ENCOUNTER — APPOINTMENT (OUTPATIENT)
Dept: CT IMAGING | Age: 34
End: 2024-08-12
Attending: EMERGENCY MEDICINE
Payer: COMMERCIAL

## 2024-08-12 ENCOUNTER — TELEPHONE (OUTPATIENT)
Dept: OBGYN CLINIC | Facility: CLINIC | Age: 34
End: 2024-08-12

## 2024-08-12 ENCOUNTER — HOSPITAL ENCOUNTER (EMERGENCY)
Age: 34
Discharge: HOME | End: 2024-08-12
Attending: EMERGENCY MEDICINE
Payer: COMMERCIAL

## 2024-08-12 VITALS
DIASTOLIC BLOOD PRESSURE: 76 MMHG | RESPIRATION RATE: 16 BRPM | SYSTOLIC BLOOD PRESSURE: 122 MMHG | OXYGEN SATURATION: 99 % | WEIGHT: 155 LBS | HEIGHT: 67 IN | BODY MASS INDEX: 24.33 KG/M2 | TEMPERATURE: 98 F | HEART RATE: 85 BPM

## 2024-08-12 DIAGNOSIS — R10.2 PELVIC PAIN: Primary | ICD-10-CM

## 2024-08-12 LAB
ALBUMIN SERPL-MCNC: 3.4 G/DL (ref 3.4–5)
ALBUMIN/GLOB SERPL: 0.9 {RATIO} (ref 1–2)
ALP LIVER SERPL-CCNC: 60 U/L
ALT SERPL-CCNC: 23 U/L
ANION GAP SERPL CALC-SCNC: 6 MMOL/L (ref 0–18)
AST SERPL-CCNC: 16 U/L (ref 15–37)
B-HCG UR QL: NEGATIVE
BASOPHILS # BLD AUTO: 0.04 X10(3) UL (ref 0–0.2)
BASOPHILS NFR BLD AUTO: 0.6 %
BILIRUB SERPL-MCNC: 0.5 MG/DL (ref 0.1–2)
BILIRUB UR QL STRIP.AUTO: NEGATIVE
BUN BLD-MCNC: 14 MG/DL (ref 9–23)
CALCIUM BLD-MCNC: 9.2 MG/DL (ref 8.5–10.1)
CHLORIDE SERPL-SCNC: 105 MMOL/L (ref 98–112)
CLARITY UR REFRACT.AUTO: CLEAR
CO2 SERPL-SCNC: 23 MMOL/L (ref 21–32)
COLOR UR AUTO: YELLOW
CREAT BLD-MCNC: 0.79 MG/DL
EGFRCR SERPLBLD CKD-EPI 2021: 102 ML/MIN/1.73M2 (ref 60–?)
EOSINOPHIL # BLD AUTO: 0.02 X10(3) UL (ref 0–0.7)
EOSINOPHIL NFR BLD AUTO: 0.3 %
ERYTHROCYTE [DISTWIDTH] IN BLOOD BY AUTOMATED COUNT: 11.9 %
GLOBULIN PLAS-MCNC: 3.9 G/DL (ref 2.8–4.4)
GLUCOSE BLD-MCNC: 100 MG/DL (ref 70–99)
GLUCOSE UR STRIP.AUTO-MCNC: NEGATIVE MG/DL
HCT VFR BLD AUTO: 37.5 %
HGB BLD-MCNC: 13 G/DL
IMM GRANULOCYTES # BLD AUTO: 0.01 X10(3) UL (ref 0–1)
IMM GRANULOCYTES NFR BLD: 0.2 %
KETONES UR STRIP.AUTO-MCNC: NEGATIVE MG/DL
LEUKOCYTE ESTERASE UR QL STRIP.AUTO: NEGATIVE
LIPASE SERPL-CCNC: 23 U/L (ref 12–53)
LYMPHOCYTES # BLD AUTO: 1.99 X10(3) UL (ref 1–4)
LYMPHOCYTES NFR BLD AUTO: 30.1 %
MCH RBC QN AUTO: 30 PG (ref 26–34)
MCHC RBC AUTO-ENTMCNC: 34.7 G/DL (ref 31–37)
MCV RBC AUTO: 86.4 FL
MONOCYTES # BLD AUTO: 0.33 X10(3) UL (ref 0.1–1)
MONOCYTES NFR BLD AUTO: 5 %
NEUTROPHILS # BLD AUTO: 4.22 X10 (3) UL (ref 1.5–7.7)
NEUTROPHILS # BLD AUTO: 4.22 X10(3) UL (ref 1.5–7.7)
NEUTROPHILS NFR BLD AUTO: 63.8 %
NITRITE UR QL STRIP.AUTO: NEGATIVE
OSMOLALITY SERPL CALC.SUM OF ELEC: 279 MOSM/KG (ref 275–295)
PH UR STRIP.AUTO: 6.5 [PH] (ref 5–8)
PLATELET # BLD AUTO: 260 10(3)UL (ref 150–450)
POTASSIUM SERPL-SCNC: 3.7 MMOL/L (ref 3.5–5.1)
PROT SERPL-MCNC: 7.3 G/DL (ref 6.4–8.2)
PROT UR STRIP.AUTO-MCNC: NEGATIVE MG/DL
RBC # BLD AUTO: 4.34 X10(6)UL
RBC UR QL AUTO: NEGATIVE
SODIUM SERPL-SCNC: 134 MMOL/L (ref 136–145)
SP GR UR STRIP.AUTO: 1.01 (ref 1–1.03)
UROBILINOGEN UR STRIP.AUTO-MCNC: 0.2 MG/DL
WBC # BLD AUTO: 6.6 X10(3) UL (ref 4–11)

## 2024-08-12 PROCEDURE — 74177 CT ABD & PELVIS W/CONTRAST: CPT | Performed by: EMERGENCY MEDICINE

## 2024-08-12 PROCEDURE — 83690 ASSAY OF LIPASE: CPT | Performed by: EMERGENCY MEDICINE

## 2024-08-12 PROCEDURE — 99284 EMERGENCY DEPT VISIT MOD MDM: CPT

## 2024-08-12 PROCEDURE — 96374 THER/PROPH/DIAG INJ IV PUSH: CPT

## 2024-08-12 PROCEDURE — 85025 COMPLETE CBC W/AUTO DIFF WBC: CPT

## 2024-08-12 PROCEDURE — 85025 COMPLETE CBC W/AUTO DIFF WBC: CPT | Performed by: EMERGENCY MEDICINE

## 2024-08-12 PROCEDURE — 81025 URINE PREGNANCY TEST: CPT

## 2024-08-12 PROCEDURE — 83690 ASSAY OF LIPASE: CPT

## 2024-08-12 PROCEDURE — 80053 COMPREHEN METABOLIC PANEL: CPT

## 2024-08-12 PROCEDURE — 80053 COMPREHEN METABOLIC PANEL: CPT | Performed by: EMERGENCY MEDICINE

## 2024-08-12 PROCEDURE — 81003 URINALYSIS AUTO W/O SCOPE: CPT | Performed by: EMERGENCY MEDICINE

## 2024-08-12 PROCEDURE — 99285 EMERGENCY DEPT VISIT HI MDM: CPT

## 2024-08-12 PROCEDURE — 81003 URINALYSIS AUTO W/O SCOPE: CPT

## 2024-08-12 RX ORDER — KETOROLAC TROMETHAMINE 15 MG/ML
15 INJECTION, SOLUTION INTRAMUSCULAR; INTRAVENOUS ONCE
Status: COMPLETED | OUTPATIENT
Start: 2024-08-12 | End: 2024-08-12

## 2024-08-12 RX ORDER — KETOROLAC TROMETHAMINE 10 MG/1
10 TABLET, FILM COATED ORAL EVERY 6 HOURS PRN
Qty: 20 TABLET | Refills: 0 | Status: SHIPPED | OUTPATIENT
Start: 2024-08-12 | End: 2024-08-19

## 2024-08-12 NOTE — TELEPHONE ENCOUNTER
Called to follow up with patient.   Patient has been having long term pelvic pressure and pain since  in 2024. Is being seen in physical therapy for pressure.     Last night pressure was considerably worse, woke her up from sleep and states it feels like when she was pregnant. When standing pressure becomes worse, took tylenol and used ice with only minimal improvement. Was able to return to sleep.     This morning is now experiencing lower right abdominal pain radiating to middle/left side and pressure.     Advised patient to be seen in ER for sudden onset pain. Agreeable to plan, all questions answered.

## 2024-08-12 NOTE — TELEPHONE ENCOUNTER
Pt called to make ER f/u appt. Pt has been suffering for abdominal and pelvic pain.Pt went to ER and was told that her uterus is enlarged. Pt gave birth   3/28/24 had PP on 5/10/24.

## 2024-08-12 NOTE — TELEPHONE ENCOUNTER
Called to follow up with patient.     Patient seen in ER today to follow up on pelvic pain and pressure.   CT scan mostly benign findings, enlarged uterus possibly from postpartum status. Patient still experiencing pain and discomfort. Scheduled to be seen this week on 08/14.     All questions answered, will continue to follow ER precautions until appointment. To call office with any further questions or concerns.

## 2024-08-12 NOTE — ED INITIAL ASSESSMENT (HPI)
34 y/oF arrives to ED with c/o pelvic pressure increasing since delivery. Patient reports RLQ abd pain radiating across abd started this morning around 0630. Patient reports mild nausea from pain; denies any vomiting or diarrhea. Patient reports she called her OB and was told to come to ED. Patient reports vaginal delivery 3/27/24. Patient reports that was her first baby.

## 2024-08-12 NOTE — TELEPHONE ENCOUNTER
Pt having severe pelvic pain. States this has been ongoing and even referred to PT but she states it has got increasingly worse in a short time and wondering if she should be more concerned.

## 2024-08-12 NOTE — ED PROVIDER NOTES
Patient Seen in: [facility] In [place]      History     Chief Complaint   Patient presents with    Eval-G    Abdomen/Flank Pain     Stated Complaint: sent from OB for pelvic pressure and abd pain    Subjective:   HPI    The patient, Ms Lopez, presented with pain and pressure in her lower abdomen and pelvis area. She reported having given birth four months ago and has been experiencing these symptoms since then. Prior to giving birth, she had Symphysis Pubis Dysfunction (SPD), which she described as a pressure-like sensation. Although the condition improved postpartum, she still experiences pressure in the area. She is currently undergoing physical therapy for this issue which she started Tuesday. The patient reported a sudden worsening of her symptoms the previous night. She described the pain as intensifying each time she stood up. She also noted that the pain felt similar to her previous SPD pain, but more severe. In addition to the pelvic pain, she started experiencing abdominal pain that morning. The pain was more intense in her lower abdomen but radiated throughout. She found the pain to be more tolerable when lying down. The patient reported that her SPD pain typically worsens with activity and improves with rest, which has limited her ability to exercise. She noted that the current episode of pain started while she was sleeping and that she has not experienced this type of abdominal pain before. The pain worsens when she is upright. She denied having any associated symptoms such as nausea or vomiting. She managed to eat a banana and some waffles in the morning, but her appetite has since decreased due to the pain. She reported regular bowel movements and denied any pain during urination, vaginal bleeding or discharge, and fever. To manage the pain, she took Tylenol the previous night and again that morning, which helped her fall back asleep.     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    Extrinsic asthma, unspecified    sports induced    GERD (gastroesophageal reflux disease)    Hematuria    weird presentation of ibs    History of gross hematuria    History of recurrent UTIs    Interstitial cystitis    Irritable bowel    Knee pain    MRSA infection    Eye    Other and unspecified ovarian cysts    Pain in both knees, unspecified chronicity    Pelvic floor dysfunction    Pelvic inflammatory disease    PONV (postoperative nausea and vomiting)    nausea    Right ankle pain, unspecified chronicity    Right rotator cuff tendonitis    Seizure disorder ([facility])    Sees Dr. Lin- last seizure November 2015. seizures since around age 12 with events of LOC without shaking. In 2016 (age 25) she had another event of LOC with urinary incontinence without shaking. Was told EEG abnormal at this time. Started LEV and had no recurrent events of this type.              Past Surgical History:   Procedure Laterality Date    Colonoscopy  02/01/2012    nl ti, nl colon, nl random colon bx.    Colonoscopy N/A 05/21/2018    Procedure: COLONOSCOPY;  Surgeon: Henry Carpio MD;  Location:  ENDOSCOPY    Other surgical history  03/30/2012    Cystoscopy - Dr. Vidal     Other surgical history  01/01/2012    Negative - ab. 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[employer], commutes, 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[employer]    Vaping Use    Vaping status: Never Used   Substance and Sexual Activity    Alcohol use: Not Currently    Drug use: No    Sexual activity: Not Currently     Partners: Male   Other Topics Concern    Caffeine Concern Yes     Comment: 1/2 coffee a couple times a week    Exercise No     Social Determinants of Health     Financial Resource Strain: Low Risk  (3/26/2024)    Financial Resource Strain     Difficulty of Paying Living Expenses: Not hard at all     Med Affordability: No   Food Insecurity: No Food Insecurity (3/26/2024)    Food Insecurity     Food Insecurity: Never true   Transportation Needs: No Transportation Needs (3/26/2024)    Transportation Needs     Lack of Transportation: No   Stress: No Stress Concern Present (3/26/2024)    Stress     Feeling of Stress : No   Housing Stability: Low Risk  (3/26/2024)    Housing Stability     Housing Instability: No              Review of Systems    Positive for stated Chief Complaint: Eval-G and Abdomen/Flank Pain    Other systems are as noted in HPI.  Constitutional and vital signs reviewed.      All other systems reviewed and negative except as noted above.    Physical Exam     ED Triage Vitals [08/12/24 1207]   /65   Pulse 85   Resp 17   Temp 98.2 °F (36.8 °C)   Temp src Oral   SpO2 96 %   O2 Device None (Room air)       Current Vitals:   Vital Signs  BP: 122/76  Pulse: 85  Resp: 16  Temp: 98 °F (36.7 °C)  Temp src: Oral    Oxygen Therapy  SpO2: 99 %  O2 Device: None (Room air)            Physical Exam    General: Alert and oriented x3 in mild distress due to pain.  Cardiovascular: Regular rate and rhythm, no murmurs.  Respiratory: Lungs clear to auscultation.  Abdomen: Soft, tender across lower abdomen, no rebound or guarding, normal active  bowel sounds, no CVA tenderness.    : No visible evidence of vaginal prolapse, no perineal lesion.  Extremities: No CCE.  Skin: Warm and dry.    ED Course     Labs Reviewed   COMP METABOLIC PANEL (14) - Abnormal; Notable for the following components:       Result Value    Glucose 100 (*)     Sodium 134 (*)     A/G Ratio 0.9 (*)     All other components within normal limits   LIPASE - Normal   POCT PREGNANCY URINE - Normal   CBC WITH DIFFERENTIAL WITH PLATELET   URINALYSIS, ROUTINE           CT ABDOMEN+PELVIS(CONTRAST ONLY)(CPT=74177)    Result Date: 8/12/2024  PROCEDURE:  CT ABDOMEN+PELVIS (CONTRAST ONLY) (CPT=74177)  COMPARISON:  [facility] , CT, CT ABDOMEN PELVIS IV CONTRAST, NO ORAL (ER), 9/22/2015, 3:01 AM.  INDICATIONS:  sent from OB for pelvic pressure and abd pain, 4.5 months post partum  TECHNIQUE:  CT scanning was performed from the dome of the diaphragm to the pubic symphysis with non-ionic intravenous contrast material. Post contrast coronal MPR imaging was performed.  Dose reduction techniques were used. Dose information is transmitted to the ACR (American College of Radiology) NRDR (National Radiology Data Registry) which includes the Dose Index Registry.  PATIENT STATED HISTORY:(As transcribed by Technologist)  34 y/oF arrives to ED with c/o pelvic pressure increasing since delivery. Patient reports RLQ abd pain radiating across abd started this morning around 0630. Patient reports mild nausea from pain; denies any vomiting or diarrhea. vaginal delivery 3/24   CONTRAST USED:  80cc of Isovue 370  FINDINGS:  LIVER:  No enlargement, atrophy, abnormal density, or significant focal lesion.  BILIARY:  No visible dilatation or calcification.  PANCREAS:  No lesion, fluid collection, ductal dilatation, or atrophy.  SPLEEN:  No enlargement or focal lesion.  KIDNEYS:  No mass, obstruction, or calcification.  ADRENALS:  No mass or enlargement.  AORTA/VASCULAR:  No aneurysm or dissection.  RETROPERITONEUM:  No mass  or adenopathy.  BOWEL/MESENTERY:  No visible mass, obstruction, or bowel wall thickening.  The appendix is normal. ABDOMINAL WALL:  No mass or hernia.  URINARY BLADDER:  No visible focal wall thickening, lesion, or calculus.  PELVIC NODES:  No adenopathy.  PELVIC ORGANS:  The uterus is prominent mildly heterogeneous likely representing a post gravid appearance. BONES:  No bony lesion or fracture.  LUNG BASES:  No visible pulmonary or pleural disease.  OTHER:  Negative.             CONCLUSION:  1. No acute abnormality in the abdomen and pelvis.  Mild heterogeneity of the uterus likely represents a post gravid appearance.   LOCATION:  [facility]   Dictated by (CST): Eric Hammond MD on 8/12/2024 at 2:54 PM     Finalized by (CST): Eric Hammond MD on 8/12/2024 at 2:58 PM        Medications   ketorolac (Toradol) 15 MG/ML injection 15 mg (15 mg Intravenous Given 8/12/24 1307)   iopamidol 76% (ISOVUE-370) injection for power injector (80 mL Intravenous Given 8/12/24 1436)     Patient was given Toradol with some improvement in symptoms.       MDM      Patient presents with abdomen/pelvic pain.  Differential diagnosis includes but is not limited to SPD pain, urinary tract infection and acute appendicitis.  The patient's workup here has been overall reassuring.  She does not have a fever or leukocytosis.  Her CT does not show appendicitis and her UA is negative for infection.  Her CT does show mild heterogeneity of the uterus likely from post gravid state.  She is not having any vaginal bleeding or discharge to suggest endometritis.  Her pain seems to be a worse version of her previous SPD pain.  This has started after initiating therapy.  I discussed her case with Dr. David, her OB/GYN.  She felt that the patient could continue with NSAIDs and her physical therapy.  She will see her for an exam urgently in the office for further evaluation.                           Medical Decision Making      Disposition and Plan      Clinical Impression:  1. Pelvic pain         Disposition:  Discharge  8/12/2024  4:07 pm    Follow-up:  Larisa David MD  [id number] [address] [place] [id number]  [phone]    Call in 1 day(s)            Medications Prescribed:  Discharge Medication List as of 8/12/2024  4:11 PM        START taking these medications    Details   Ketorolac Tromethamine 10 MG Oral Tab Take 1 tablet (10 mg total) by mouth every 6 (six) hours as needed., Normal, Disp-20 tablet, R-0

## 2024-08-13 ENCOUNTER — TELEPHONE (OUTPATIENT)
Dept: PHYSICAL THERAPY | Facility: HOSPITAL | Age: 34
End: 2024-08-13

## 2024-08-13 ENCOUNTER — APPOINTMENT (OUTPATIENT)
Dept: PHYSICAL THERAPY | Age: 34
End: 2024-08-13
Attending: OBSTETRICS & GYNECOLOGY
Payer: COMMERCIAL

## 2024-08-13 ENCOUNTER — OFFICE VISIT (OUTPATIENT)
Dept: OBGYN CLINIC | Facility: CLINIC | Age: 34
End: 2024-08-13
Payer: COMMERCIAL

## 2024-08-13 VITALS
HEART RATE: 91 BPM | BODY MASS INDEX: 24.21 KG/M2 | HEIGHT: 67 IN | WEIGHT: 154.25 LBS | SYSTOLIC BLOOD PRESSURE: 108 MMHG | DIASTOLIC BLOOD PRESSURE: 60 MMHG

## 2024-08-13 DIAGNOSIS — R10.2 PELVIC PAIN: Primary | ICD-10-CM

## 2024-08-13 PROCEDURE — 81514 NFCT DS BV&VAGINITIS DNA ALG: CPT | Performed by: NURSE PRACTITIONER

## 2024-08-13 PROCEDURE — 3074F SYST BP LT 130 MM HG: CPT | Performed by: NURSE PRACTITIONER

## 2024-08-13 PROCEDURE — 99213 OFFICE O/P EST LOW 20 MIN: CPT | Performed by: NURSE PRACTITIONER

## 2024-08-13 PROCEDURE — 3078F DIAST BP <80 MM HG: CPT | Performed by: NURSE PRACTITIONER

## 2024-08-13 PROCEDURE — 3008F BODY MASS INDEX DOCD: CPT | Performed by: NURSE PRACTITIONER

## 2024-08-13 NOTE — PROGRESS NOTES
Addended by: MINE JAY on: 11/14/2022 09:40 AM     Modules accepted: Orders     Subjective:  34 year old    Chief Complaint   Patient presents with    ER F/U     Pt is here for an ER f/u visit from 24 due to lower abdominal/pelvic pain. Pt states that she had a CT scan done while in the ER and was told that her Uterus looked enlarged and referred her to MD. Pt also states that she is still having pressure and pain level is a 4 out of 10 due to meds. Pt started physical therapy on Wednesday.     Pt here today for ED follow up  Pt has had persistent pelvic pressure since her pregnancy  She started pelvic floor PT on  for Symphysis Pubis Dysfunction  She notes that on  the pressure worsened, was also having lower abdominal pain so presented to the ED  No recent changes. Last PT session , no recent intercourse or anything in vagina  CT showed possibly enlarged uterus, otherwise unremarkable  Denies unusual vaginal discharge and odor   Does get relief from Toradol and heating pad    Pt expresses sadness about going back to work tomorrow    Review of Systems:  Pertinent items are noted in the HPI.    Objective:  /60   Pulse 91   Ht 67\"   Wt 154 lb 4 oz (70 kg)   LMP 2024 (Exact Date)       Physical Examination:  General appearance: Well dressed, well nourished in no apparent distress  Neurologic/Psychiatric: Alert and oriented to person, place and time, mood normal, affect appropriate  Abdomen: Soft, non-tender, non-distended, no masses, no hepatosplenomegaly, no hernias, no inguinal lymphadenopathy  Pelvic:    External genitalia- Normal, Bartholin's, urethra, skeins glands normal   Vagina- No vaginal lesions, small thick white discharge   Cervix- No lesions, long/closed, no cervical motion tenderness   Uterus- Normal, + tender, no masses   Adnexa-  + R adnexal tenderness, no masses    Assessment/Plan:        Diagnoses and all orders for this visit:    Pelvic pain  -     Vaginitis Vaginosis PCR Panel; Future  -     US PELVIS (TRANSABDOMINAL AND TRANSVAGINAL)  (IZR=[id number]); Future  - continue NSAIDS and heat pad as needed  - will treat based on culture and imaging results  - to follow up with new/worsening symptoms or no improvement        Return if symptoms worsen or fail to improve.

## 2024-08-14 LAB
BV BACTERIA DNA VAG QL NAA+PROBE: NEGATIVE
C GLABRATA DNA VAG QL NAA+PROBE: NEGATIVE
C KRUSEI DNA VAG QL NAA+PROBE: NEGATIVE
CANDIDA DNA VAG QL NAA+PROBE: NEGATIVE
T VAGINALIS DNA VAG QL NAA+PROBE: NEGATIVE

## 2024-08-15 ENCOUNTER — TELEPHONE (OUTPATIENT)
Dept: PHYSICAL THERAPY | Facility: HOSPITAL | Age: 34
End: 2024-08-15

## 2024-08-27 ENCOUNTER — OFFICE VISIT (OUTPATIENT)
Dept: PHYSICAL THERAPY | Age: 34
End: 2024-08-27
Attending: OBSTETRICS & GYNECOLOGY
Payer: COMMERCIAL

## 2024-08-27 PROCEDURE — 97110 THERAPEUTIC EXERCISES: CPT

## 2024-08-27 NOTE — PROGRESS NOTES
Diagnosis:   Pelvic floor weakness (N81.89)         Referring Provider: Larisa David  Date of Evaluation:    8/6/2024    Precautions:  Drug Allergy, Bring inhaler (asthma), Seizures - controlled Next MD visit:   none scheduled  Date of Surgery: n/a   Insurance Primary/Secondary: [place] / N/A     # Auth Visits: 5            Subjective: Pt reports the ultrasound is on this Thursday. Pt reports she is better, reports pain is cycling where she will have pain & then it will go away. Still has the pressure. Original symptoms: pt reports in middle of night woke up with pressure, then a few hours later turned into abdominal pain. Has returned to work.    Pain: 4/10      Objective: See Flowsheet for details  8/27/2024:  External Palpation: no tenderness to B hip Adductors; TTP & STRs R glutes/ piriformis  Abdominal Wall Integrity: good tension through linea alba with mild increased laxity at umbilicus  Diastasis Recti: (finger width depth while contracted)  Above Umbilicus: 0  Umbilicus: 0-1  Below Umbilicus: 0  Mild coning  Visual descent of tissue with abdominal bracing    Strength (MMT): Hip Abduction: R 3/5*, L 4-/5    Flexibility Summary: WNL BAILEY LE except min restricted B HS, min restricted L piriformis & mod restricted R piriformis      Assessment: Cont with external ortho exam today with findings listed above. Both PT & pt in agreement to defer internal work today. Weakness to hip abduction R>L with tenderness/ tightness to R glutes/ piriformis. Progressed with stretching & strengthening for hips to address deficits.      Goals: (to be met in 10 visits)  Patient demonstrates efficiency with Levator Ani/ Transverse Abdominis (Pelvic Brace) contraction for ability to resume desired exercise.  Patient consistently demonstrates Levator Ani contraction inverse to diaphragmatic breathing to allow for pelvic brace with ADLs without valsalva for reduced pressure symptoms with walking.  Patient presents with resolved  myofascial pelvic floor soft tissue restrictions & pain for reduced discomfort including LBP with ability to sleep at night without disruption.  Patient reports the ability to keep tampons in without falling out.  Patient understands importance of performing HEP to prevent reoccurrence of symptoms.     Plan: Assess for update following ultrasound on Thursday. Assess hip flexors/ iliopsoas  Date: 8/27/2024  TX#: 2/10 Date:                 TX#: 3/ Date:                 TX#: 4/ Date:                 TX#: 5/ Date:   TX#: 6/   Therex: 40'  Cont assessment: obj measures (see above)  Figure-4 piriformis stretch supine 1 X 30\"  ea & seated 1 X 30\" ea   Sleep positions: pillow between knees  Education: muscle tightness/ tension & weakness connection  Hooklying hip Abd Anuja with Blue TB 5\" X 10       HEP: Coordinating PFM with Breathing  8/27/2024: piriformis stretches supine & seated, hooklying hip abduction isometric with Blue TheraBand    Charges: Therex X 3       Total Timed Treatment: 40 min  Total Treatment Time: 40 min

## 2024-08-29 ENCOUNTER — HOSPITAL ENCOUNTER (OUTPATIENT)
Dept: ULTRASOUND IMAGING | Age: 34
Discharge: HOME | End: 2024-08-29
Attending: NURSE PRACTITIONER
Payer: COMMERCIAL

## 2024-08-29 DIAGNOSIS — R10.2 PELVIC PAIN: ICD-10-CM

## 2024-08-29 PROCEDURE — 76856 US EXAM PELVIC COMPLETE: CPT | Performed by: NURSE PRACTITIONER

## 2024-08-29 PROCEDURE — 76830 TRANSVAGINAL US NON-OB: CPT | Performed by: NURSE PRACTITIONER

## 2024-09-03 ENCOUNTER — OFFICE VISIT (OUTPATIENT)
Dept: PHYSICAL THERAPY | Age: 34
End: 2024-09-03
Attending: OBSTETRICS & GYNECOLOGY
Payer: COMMERCIAL

## 2024-09-03 PROCEDURE — 97110 THERAPEUTIC EXERCISES: CPT

## 2024-09-03 NOTE — PROGRESS NOTES
Diagnosis:   Pelvic floor weakness (N81.89)         Referring Provider: Larisa David  Date of Evaluation:    8/6/2024    Precautions:  Drug Allergy, Bring inhaler (asthma), Seizures - controlled Next MD visit:   none scheduled  Date of Surgery: n/a   Insurance Primary/Secondary: [place] / N/A     # Auth Visits: 5            Subjective: Pt reports ultrasound is WNL. Pt is wondering if could be ovarian cyst rupture. Currently dealing with \"normal\" pain    Pain: 2/10      Objective: See Flowsheet for details  9/3/2024:  Flexibility: min restricted R & min-mod restricted L hip flexors  Palpation: ++TTP R psoas > +iliacus; mild discomfort/ TTP on the L iliacus & psoas      8/27/2024:  External Palpation: no tenderness to B hip Adductors; TTP & STRs R glutes/ piriformis  Abdominal Wall Integrity: good tension through linea alba with mild increased laxity at umbilicus  Diastasis Recti: (finger width depth while contracted)  Above Umbilicus: 0  Umbilicus: 0-1  Below Umbilicus: 0  Mild coning  Visual descent of tissue with abdominal bracing    Strength (MMT): Hip Abduction: R 3/5*, L 4-/5    Flexibility Summary: WNL BAILEY LE except min restricted B HS, min restricted L piriformis & mod restricted R piriformis      Assessment: No lingering tenderness to hip flexors after initial palpation. Pt educated in connection between dysfunction at hip flexors & to symptoms. More challenge with R vs L hip abduction raises in sidelying as expected though with cues able to complete with proper form. Pt advised in potential of normal soreness from treatment today.      Goals: (to be met in 10 visits)  Patient demonstrates efficiency with Levator Ani/ Transverse Abdominis (Pelvic Brace) contraction for ability to resume desired exercise.  Patient consistently demonstrates Levator Ani contraction inverse to diaphragmatic breathing to allow for pelvic brace with ADLs without valsalva for reduced pressure symptoms with walking.  Patient  presents with resolved myofascial pelvic floor soft tissue restrictions & pain for reduced discomfort including LBP with ability to sleep at night without disruption.  Patient reports the ability to keep tampons in without falling out.  Patient understands importance of performing HEP to prevent reoccurrence of symptoms.     Plan: Cont strengthening & stretching activities. Engage TA bracing/ pelvic brace  Date: 8/27/2024  TX#: 2/10 Date: 9/3/2024  TX#: 3/10 Date:                 TX#: 4/ Date:                 TX#: 5/ Date:   TX#: 6/   Therex: 40'  Cont assessment: obj measures (see above)  Figure-4 piriformis stretch supine 1 X 30\"  ea & seated 1 X 30\" ea   Sleep positions: pillow between knees  Education: muscle tightness/ tension & weakness connection  Hooklying hip Abd Anuja with Blue TB 5\" X 10 Therex: 40'  Cont obj measures  Modified dean stretch tableside hip flexor stretch RLE X 3' - HEP  Hooklying PPT 3 X 10 - HEP  S/L hip Abd 2 X 10 B - HEP  Bridge with hip Add anuja with yellow ball 3 X 10 - HEP  Flat back stretch 3 X 30\" tableside - HEP  Hip Add stretch 3 X 30\" ea tableside - HEP  Instruction in standing hip flexor stretches with practice - HEP      HEP: Coordinating PFM with Breathing  8/27/2024: piriformis stretches supine & seated, hooklying hip abduction isometric with Blue TheraBand  Access Code: [email or web address]  URL: [email or web address]  Date: 09/03/2024  Prepared by: Kassandra Randhawa    Exercises  - Standing Hip Flexor Stretch  - 1-2 x daily - 7 x weekly - 3 sets - 30 sec hold  - Sidelying Hip Abduction  - 1 x daily - 4-5 x weekly - 2 sets - 10 reps  - Modified Dean Stretch  - 1-2 x daily - 7 x weekly - 1 sets - 10 reps - 5 sec hold  - Supine Bridge with Mini Swiss Ball Between Knees  - 1 x daily - 4-5 x weekly - 3 sets - 10 reps - 3-5 sec hold  - Standing 'L' Stretch at Counter  - 1-2 x daily - 7 x weekly - 3 sets - 30 sec hold    Charges: Therex X 3       Total Timed Treatment: 40  min  Total Treatment Time: 40 min

## 2024-09-03 NOTE — PATIENT INSTRUCTIONS
Access Code: [email or web address]  URL: [email or web address]  Date: 09/03/2024  Prepared by: Kassandra Randhawa    Exercises  - Standing Hip Flexor Stretch  - 1-2 x daily - 7 x weekly - 3 sets - 30 sec hold  - Sidelying Hip Abduction  - 1 x daily - 4-5 x weekly - 2 sets - 10 reps  - Modified Dean Stretch  - 1-2 x daily - 7 x weekly - 1 sets - 10 reps - 5 sec hold  - Supine Bridge with Mini Swiss Ball Between Knees  - 1 x daily - 4-5 x weekly - 3 sets - 10 reps - 3-5 sec hold  - Standing 'L' Stretch at Counter  - 1-2 x daily - 7 x weekly - 3 sets - 30 sec hold

## 2024-09-10 ENCOUNTER — OFFICE VISIT (OUTPATIENT)
Dept: PHYSICAL THERAPY | Age: 34
End: 2024-09-10
Attending: OBSTETRICS & GYNECOLOGY
Payer: COMMERCIAL

## 2024-09-10 PROCEDURE — 97140 MANUAL THERAPY 1/> REGIONS: CPT

## 2024-09-10 PROCEDURE — 97112 NEUROMUSCULAR REEDUCATION: CPT

## 2024-09-10 NOTE — PATIENT INSTRUCTIONS
Access Code: [email or web address]  URL: [email or web address]  Date: 09/10/2024  Prepared by: Kassandra Randhawa    Exercises  - Supine Transversus Abdominis Bracing - Hands on Stomach  - 1 x daily - 7 x weekly - 2-3 min duration  - Supine Transversus Abdominis Bracing with Pelvic Floor Contraction  - 1 x daily - 7 x weekly - 2-3 min duration                THE PELVIC BRACE    The pelvic brace is a combined pelvic floor and transverse abdominal low intensity muscle contraction. The transverse abdominal muscle is the deepest layer of the abdominal muscles and it aids in supporting the pelvic organs, spine and pelvis. Contraction of the transverse abdominal muscle creates a mild intensity tension of the lateral lower abdominal wall. Regular exercise of these muscles can build strength, awareness and retrain the muscles how to function together.     Correctly using and coordinating your pelvic floor and abdominal muscles can be the key to controlling your incontinence problem. The pelvic brace exercise creates an internal girdle to support your bladder and pelvic organs. First learn to perform the pelvic brace correctly, and then use the brace with physical activities that put excessive pressure on the pelvic organs and pelvic floor muscles.     POSTURAL MUSCLES  Many muscles in our body function all day long to keep us upright against gravity. We rarely notice that these muscles are even working as they perform at very low intensity. The transverse abdominal and pelvic floor muscles are included in this group of muscles along with muscles such as your back and neck muscles. When one of the muscles get weak it requires retraining to get it working again.     GUIDELINES TO PERFORMING THE PELVIC BRACE  The pelvic brace contraction creates a feeling of deep tension and drawing in of the lower abdomen and pelvic floor.   Your spine (low back) should be in neutral position.  Your therapist will help you find your neutral  position.  Always perform the exercise as instructed by your therapist.   The correct contraction creates a hollowing of the lower belly to the bikini line.  Do not strain, bear down or bulge your abdomen as you do the exercise.    PERFORMING THE PELVIC BRACE  Place your fingers on your lower abdomen just inside your pelvic bones. You should feel the low level contraction under your fingertips.  Contract the pelvic floor creating tension in the surface layer muscles that moves up to you abdomen and stops at the bikini line. This draws in and flattens the lower and side muscles of your abdomen.  Imagine drawing the vagina or scrotum into the body.   Tighten both the muscles as if you are trying to zip up a pair of pants to the bikini line.   Breathe while you maintain the low level contraction.  Remember, do not bulge your belly, strain or allow movement in the pelvis or back.  If you feel the quality of your exercise decline by starting to strain or bulge the abdominal muscles, stop your exercise session.  © 2004, [employer],                  THE PELVIC BRACE WITH DAILY ACTIVITIES  Try the following daily activities in lying, sitting and standing positions.    The pelvic brace and cough  Breathe in, as you prepare to cough, bring your hand to your mouth and do the pelvic brace.   Hold the muscle and cough.  Now relax the brace.   If coughing causes leakage try this activity while clearing your throat.  Repeat ___ times    The pelvic brace and sit to stand  Breathe in as you prepare to stand   Do the pelvic brace.  Hold the muscles and stand up.   Be sure your therapist has shown you the proper technique.  Repeat ___ times.     The pelvic brace and lifting  Place a lightweight object of __lbs. on a table or the floor.  Place your feet shoulder width apart and keep your back straight.   Perform the pelvic brace, bend your knees to reach the object and lift.     Repeat ___ times.    Walking  Stand  straight and upright with a neutral spine.  Do the pelvic brace while you walk during the day or practice walking in place for ____ minutes.    Other activities:  ______________________________________________________________________    ______________________________________________________________________    ______________________________________________________________________      © 2004, [employer], [employer]

## 2024-09-10 NOTE — PROGRESS NOTES
Diagnosis:   Pelvic floor weakness (N81.89)         Referring Provider: Larisa David  Date of Evaluation:    8/6/2024    Precautions:  Drug Allergy, Bring inhaler (asthma), Seizures - controlled Next MD visit:   none scheduled  Date of Surgery: n/a   Insurance Primary/Secondary: [employer] [place] HMO / N/A     # Auth Visits: 5            Subjective: Pt reports today may be the worst day so far, not sure if maybe doing more. Was on feet a lot yesterday- pressure, heaviness    Pain: R sided lower pelvic 2/10      Objective: See Flowsheet for details  9/10/2024:  Palpation: STRs with varied TTP R glutes, piriformis with no significant TTP TFL, lat quad      9/3/2024:  Flexibility: min restricted R & min-mod restricted L hip flexors  Palpation: ++TTP R psoas > +iliacus; mild discomfort/ TTP on the L iliacus & psoas      8/27/2024:  External Palpation: no tenderness to B hip Adductors; TTP & STRs R glutes/ piriformis  Abdominal Wall Integrity: good tension through linea alba with mild increased laxity at umbilicus  Diastasis Recti: (finger width depth while contracted)  Above Umbilicus: 0  Umbilicus: 0-1  Below Umbilicus: 0  Mild coning  Visual descent of tissue with abdominal bracing    Strength (MMT): Hip Abduction: R 3/5*, L 4-/5    Flexibility Summary: WNL BAILEY LE except min restricted B HS, min restricted L piriformis & mod restricted R piriformis      Assessment: Cues to draw ribcage over pelvis with standing posture & with abdominal bracing. Better form with TA bracing when completing in smaller intensity.      Goals: (to be met in 10 visits)  Patient demonstrates efficiency with Levator Ani/ Transverse Abdominis (Pelvic Brace) contraction for ability to resume desired exercise.  Patient consistently demonstrates Levator Ani contraction inverse to diaphragmatic breathing to allow for pelvic brace with ADLs without valsalva for reduced pressure symptoms with walking.  Patient presents with resolved myofascial pelvic floor  soft tissue restrictions & pain for reduced discomfort including LBP with ability to sleep at night without disruption.  Patient reports the ability to keep tampons in without falling out.  Patient understands importance of performing HEP to prevent reoccurrence of symptoms.     Plan: Cont strengthening & stretching activities. Engage TA bracing/ pelvic brace  Date: 8/27/2024  TX#: 2/10 Date: 9/3/2024  TX#: 3/10 Date: 9/10/2024  TX#: 4/10 Date:                 TX#: 5/ Date:   TX#: 6/   Therex: 40'  Cont assessment: obj measures (see above)  Figure-4 piriformis stretch supine 1 X 30\"  ea & seated 1 X 30\" ea   Sleep positions: pillow between knees  Education: muscle tightness/ tension & weakness connection  Hooklying hip Abd Anuja with Blue TB 5\" X 10 Therex: 40'  Cont obj measures  Modified mitchell stretch tableside hip flexor stretch RLE X 3' - HEP  Hooklying PPT 3 X 10 - HEP  S/L hip Abd 2 X 10 B - HEP  Bridge with hip Add anuja with yellow ball 3 X 10 - HEP  Flat back stretch 3 X 30\" tableside - HEP  Hip Add stretch 3 X 30\" ea tableside - HEP  Instruction in standing hip flexor stretches with practice - HEP        Neuro Re-ed: 23'  Education for HEP: Pelvic elevation: 1-2 pillows, BLE in 90/90 position for gravity assist. Up to 10'  TA bracing instruction, with ADLs - HEP  Pelvic brace instruction, with ADLs - HEP  Standing posture: stacking ribcage over pelvis. Avoid hip pop, arching       Manual Therapy: 19'  STM: R glutes, piriformis, TFL, lat quad (sidelying & supine)  -education in potential of normal soreness, ecchymosis     HEP: Coordinating PFM with Breathing  8/27/2024: piriformis stretches supine & seated, hooklying hip abduction isometric with Blue TheraBand  Access Code: [email or web address]  URL: [email or web address]  Date: 09/03/2024  Prepared by: Kassandra Randhawa    Exercises  - Standing Hip Flexor Stretch  - 1-2 x daily - 7 x weekly - 3 sets - 30 sec hold  - Sidelying Hip Abduction  - 1 x daily -  4-5 x weekly - 2 sets - 10 reps  - Modified Dean Stretch  - 1-2 x daily - 7 x weekly - 1 sets - 10 reps - 5 sec hold  - Supine Bridge with Mini Swiss Ball Between Knees  - 1 x daily - 4-5 x weekly - 3 sets - 10 reps - 3-5 sec hold  - Standing 'L' Stretch at Counter  - 1-2 x daily - 7 x weekly - 3 sets - 30 sec hold    9/10/2024: TA bracing, Pelvic brace, pelvic elevation prn for gravity assist    Charges: Neuro X 2, Manual X 1      Total Timed Treatment: 42 min  Total Treatment Time: 42 min

## 2024-09-17 ENCOUNTER — OFFICE VISIT (OUTPATIENT)
Dept: PHYSICAL THERAPY | Age: 34
End: 2024-09-17
Attending: OBSTETRICS & GYNECOLOGY
Payer: COMMERCIAL

## 2024-09-17 DIAGNOSIS — N81.89 PELVIC FLOOR WEAKNESS: Primary | ICD-10-CM

## 2024-09-17 PROCEDURE — 97112 NEUROMUSCULAR REEDUCATION: CPT

## 2024-09-17 NOTE — PROGRESS NOTES
Diagnosis:   Pelvic floor weakness (N81.89)         Referring Provider: Larisa David  Date of Evaluation:    8/6/2024    Precautions:  Drug Allergy, Bring inhaler (asthma), Seizures - controlled Next MD visit:   none scheduled  Date of Surgery: n/a   Insurance Primary/Secondary: [employer] [place] HMO / N/A     # Auth Visits: 5            Subjective: Pt reports L hip has been hurting so has not been doing ex's as regularly; been stretching though. Relief with seated figure-4 piriformis stretch. Downward pressure with run at work. Elevating legs/ hips: helps    Pain: 3-4/10      Objective: See Flowsheet for details  9/17/2024:  TTP L PSIS  Mild L post ilial rot      9/10/2024:  Palpation: STRs with varied TTP R glutes, piriformis with no significant TTP TFL, lat quad      9/3/2024:  Flexibility: min restricted R & min-mod restricted L hip flexors  Palpation: ++TTP R psoas > +iliacus; mild discomfort/ TTP on the L iliacus & psoas      8/27/2024:  External Palpation: no tenderness to B hip Adductors; TTP & STRs R glutes/ piriformis  Abdominal Wall Integrity: good tension through linea alba with mild increased laxity at umbilicus  Diastasis Recti: (finger width depth while contracted)  Above Umbilicus: 0  Umbilicus: 0-1  Below Umbilicus: 0  Mild coning  Visual descent of tissue with abdominal bracing    Strength (MMT): Hip Abduction: R 3/5*, L 4-/5    Flexibility Summary: WNL BAILEY LE except min restricted B HS, min restricted L piriformis & mod restricted R piriformis      Assessment: Pt preferred external work today. Reduced L sided hip discomfort post-MET techniques. Emphasized TA bracing mechanics. Pt had better performance & ability to recruit in standing vs supine. Pt reported feeling PFM activate with standing bracing, which we want for promoting proper bracing. Pt to cont to work on this for home with use of visual learning with mirror.      Goals: (to be met in 10 visits)  Patient demonstrates efficiency with Levator Ani/  Transverse Abdominis (Pelvic Brace) contraction for ability to resume desired exercise.  Patient consistently demonstrates Levator Ani contraction inverse to diaphragmatic breathing to allow for pelvic brace with ADLs without valsalva for reduced pressure symptoms with walking.  Patient presents with resolved myofascial pelvic floor soft tissue restrictions & pain for reduced discomfort including LBP with ability to sleep at night without disruption.  Patient reports the ability to keep tampons in without falling out.  Patient understands importance of performing HEP to prevent reoccurrence of symptoms.     Plan: Cont strengthening & stretching activities. Explore spinal mobility work (manual & active). Reduce pressure: pelvic brace progressions  Date: 8/27/2024  TX#: 2/10 Date: 9/3/2024  TX#: 3/10 Date: 9/10/2024  TX#: 4/10 Date: 9/17/2024  TX#: 5/10 Date:   TX#: 6/   Therex: 40'  Cont assessment: obj measures (see above)  Figure-4 piriformis stretch supine 1 X 30\"  ea & seated 1 X 30\" ea   Sleep positions: pillow between knees  Education: muscle tightness/ tension & weakness connection  Hooklying hip Abd Anuja with Blue TB 5\" X 10 Therex: 40'  Cont obj measures  Modified mitchell stretch tableside hip flexor stretch RLE X 3' - HEP  Hooklying PPT 3 X 10 - HEP  S/L hip Abd 2 X 10 B - HEP  Bridge with hip Add anuja with yellow ball 3 X 10 - HEP  Flat back stretch 3 X 30\" tableside - HEP  Hip Add stretch 3 X 30\" ea tableside - HEP  Instruction in standing hip flexor stretches with practice - HEP        Neuro Re-ed: 23'  Education for HEP: Pelvic elevation: 1-2 pillows, BLE in 90/90 position for gravity assist. Up to 10'  TA bracing instruction, with ADLs - HEP  Pelvic brace instruction, with ADLs - HEP  Standing posture: stacking ribcage over pelvis. Avoid hip pop, arching Neuro Re-ed: 45'  MET techniques (manual)  -\"reset\". Pt may perform hip Abd/ Add (\"shotgun\") technique for home prn  TA bracing  -supine, standing. In  front of mirror: visual cueing, verbal cueing, manual cueing  -video: correct & incorrect form  Canister: stack ribcage over pelvis (video). Zipping  Connection between pressure: increasing pelvic & abdominal stability: progressions. Ok to trial row machine at home: with bracing      Manual Therapy: 19'  STM: R glutes, piriformis, TFL, lat quad (sidelying & supine)  -education in potential of normal soreness, ecchymosis     HEP: Coordinating PFM with Breathing  8/27/2024: piriformis stretches supine & seated, hooklying hip abduction isometric with Blue TheraBand  Access Code: [email or web address]  URL: [email or web address]  Date: 09/03/2024  Prepared by: Kassandra Randhawa    Exercises  - Standing Hip Flexor Stretch  - 1-2 x daily - 7 x weekly - 3 sets - 30 sec hold  - Sidelying Hip Abduction  - 1 x daily - 4-5 x weekly - 2 sets - 10 reps  - Modified Dean Stretch  - 1-2 x daily - 7 x weekly - 1 sets - 10 reps - 5 sec hold  - Supine Bridge with Mini Swiss Ball Between Knees  - 1 x daily - 4-5 x weekly - 3 sets - 10 reps - 3-5 sec hold  - Standing 'L' Stretch at Counter  - 1-2 x daily - 7 x weekly - 3 sets - 30 sec hold    9/10/2024: TA bracing, Pelvic brace, pelvic elevation prn for gravity assist    Charges: Neuro X 3      Total Timed Treatment: 45 min  Total Treatment Time: 45 min

## 2024-09-24 ENCOUNTER — TELEPHONE (OUTPATIENT)
Dept: PHYSICAL THERAPY | Facility: HOSPITAL | Age: 34
End: 2024-09-24

## 2024-09-24 ENCOUNTER — APPOINTMENT (OUTPATIENT)
Dept: PHYSICAL THERAPY | Age: 34
End: 2024-09-24
Attending: OBSTETRICS & GYNECOLOGY
Payer: COMMERCIAL

## 2024-10-01 ENCOUNTER — HOSPITAL ENCOUNTER (OUTPATIENT)
Dept: GENERAL RADIOLOGY | Age: 34
Discharge: HOME | End: 2024-10-01
Attending: STUDENT IN AN ORGANIZED HEALTH CARE EDUCATION/TRAINING PROGRAM
Payer: COMMERCIAL

## 2024-10-01 ENCOUNTER — OFFICE VISIT (OUTPATIENT)
Dept: FAMILY MEDICINE CLINIC | Facility: CLINIC | Age: 34
End: 2024-10-01
Payer: COMMERCIAL

## 2024-10-01 ENCOUNTER — APPOINTMENT (OUTPATIENT)
Dept: PHYSICAL THERAPY | Age: 34
End: 2024-10-01
Attending: OBSTETRICS & GYNECOLOGY
Payer: COMMERCIAL

## 2024-10-01 VITALS
RESPIRATION RATE: 18 BRPM | TEMPERATURE: 97 F | BODY MASS INDEX: 23.9 KG/M2 | HEIGHT: 67 IN | DIASTOLIC BLOOD PRESSURE: 70 MMHG | OXYGEN SATURATION: 98 % | HEART RATE: 113 BPM | SYSTOLIC BLOOD PRESSURE: 116 MMHG | WEIGHT: 152.25 LBS

## 2024-10-01 DIAGNOSIS — E86.0 DEHYDRATION: ICD-10-CM

## 2024-10-01 DIAGNOSIS — M94.0 COSTOCHONDRITIS: ICD-10-CM

## 2024-10-01 DIAGNOSIS — R00.0 TACHYCARDIA: ICD-10-CM

## 2024-10-01 DIAGNOSIS — R07.9 CHEST PAIN, UNSPECIFIED TYPE: ICD-10-CM

## 2024-10-01 DIAGNOSIS — J40 BRONCHITIS: ICD-10-CM

## 2024-10-01 DIAGNOSIS — R05.1 ACUTE COUGH: ICD-10-CM

## 2024-10-01 DIAGNOSIS — R11.0 NAUSEA: ICD-10-CM

## 2024-10-01 DIAGNOSIS — R05.1 ACUTE COUGH: Primary | ICD-10-CM

## 2024-10-01 PROCEDURE — 99213 OFFICE O/P EST LOW 20 MIN: CPT | Performed by: STUDENT IN AN ORGANIZED HEALTH CARE EDUCATION/TRAINING PROGRAM

## 2024-10-01 PROCEDURE — 3078F DIAST BP <80 MM HG: CPT | Performed by: STUDENT IN AN ORGANIZED HEALTH CARE EDUCATION/TRAINING PROGRAM

## 2024-10-01 PROCEDURE — 71046 X-RAY EXAM CHEST 2 VIEWS: CPT | Performed by: STUDENT IN AN ORGANIZED HEALTH CARE EDUCATION/TRAINING PROGRAM

## 2024-10-01 PROCEDURE — 3074F SYST BP LT 130 MM HG: CPT | Performed by: STUDENT IN AN ORGANIZED HEALTH CARE EDUCATION/TRAINING PROGRAM

## 2024-10-01 PROCEDURE — 3008F BODY MASS INDEX DOCD: CPT | Performed by: STUDENT IN AN ORGANIZED HEALTH CARE EDUCATION/TRAINING PROGRAM

## 2024-10-01 RX ORDER — BENZONATATE 100 MG/1
100 CAPSULE ORAL 3 TIMES DAILY PRN
Qty: 30 CAPSULE | Refills: 0 | Status: SHIPPED | OUTPATIENT
Start: 2024-10-01 | End: 2024-10-11

## 2024-10-01 RX ORDER — METHYLPREDNISOLONE 4 MG
TABLET, DOSE PACK ORAL
Qty: 21 EACH | Refills: 0 | Status: SHIPPED | OUTPATIENT
Start: 2024-10-01

## 2024-10-01 RX ORDER — ONDANSETRON 4 MG/1
4 TABLET, FILM COATED ORAL EVERY 8 HOURS PRN
Qty: 20 TABLET | Refills: 1 | Status: SHIPPED | OUTPATIENT
Start: 2024-10-01

## 2024-10-01 NOTE — PROGRESS NOTES
Subjective:      Chief Complaint   Patient presents with    Cough     States its been since Friday - went to IC over the weekend, had COVID, Flu, RSV -> all negative - states she started to get worse last night - did feel chest pain and coughing up mucus     HISTORY OF PRESENT ILLNESS  Cough  Associated symptoms include chest pain.     HPI obtained per patient report.  Shilpa Lopez is a pleasant 34 year old female presenting with a cough.   She developed a productive cough on Friday 9/27. She developed a fever with Tmax of 101.2 on Saturday. She was evaluated at urgent care over the weekend, where she states a Covid-19, influenza, and RSV test was negative.   Her fever resolved, but her cough began worsening last night. She also developed chest pain last night. Her cough is productive. She reports anterior central chest pressure at rest with a sharp chest pain while coughing. She denies SOB or wheezing.   She has had decreased oral intake due to nausea.     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    Extrinsic asthma, unspecified    sports induced    GERD (gastroesophageal reflux disease)    Hematuria    weird presentation of ibs    History of gross hematuria    History of recurrent UTIs    Interstitial cystitis    Irritable bowel    Knee pain    MRSA infection    Eye    Other and unspecified ovarian cysts    Pain in both knees, unspecified chronicity    Pelvic floor dysfunction    Pelvic inflammatory disease    PONV (postoperative nausea and vomiting)    nausea    Right ankle pain, unspecified chronicity    Right rotator cuff tendonitis    Seizure disorder (Formerly [facility])    Sees  Dr. Lin- last seizure November 2015. seizures since around age 12 with events of LOC without shaking. In 2016 (age 25) she had another event of LOC with urinary incontinence without shaking. Was told EEG abnormal at this time. Started LEV and had no recurrent events of this type.     Past Surgical History:   Procedure Laterality Date    Colonoscopy  02/01/2012    nl ti, nl colon, nl random colon bx.    Colonoscopy N/A 05/21/2018    Procedure: COLONOSCOPY;  Surgeon: Henry Carpio MD;  Location:  ENDOSCOPY    Other surgical history  03/30/2012    Cystoscopy - Dr. Vidal     Other surgical history  01/01/2012    Negative - ab. pain - resolved with amitriptyline    Other surgical history  05/16/2017    Cystoscopy - Dr Kruger    Other surgical history  06/07/2017    Cysto w low pressure hydro-distention, fulguration of Hunner's ulcer & inj kenalog    Tonsillectomy         CURRENT MEDICATIONS  Outpatient Medications Marked as Taking for the 10/1/24 encounter (Office Visit) with Loretta Pickard MD   Medication Sig Dispense Refill    benzonatate 100 MG Oral Cap Take 1 capsule (100 mg total) by mouth 3 (three) times daily as needed for cough. SWALLOW CAPSULE WHOLE. DO NOT BREAK, CUT, CRUSH, CHEW, OR DISSOLVE CAPSULE. 30 capsule 0    ondansetron (ZOFRAN) 4 mg tablet Take 1 tablet (4 mg total) by mouth every 8 (eight) hours as needed for Nausea. 20 tablet 1    methylPREDNISolone (MEDROL) 4 MG Oral Tablet Therapy Pack Take by mouth as directed 21 each 0    FLUTICASONE-SALMETEROL 100-50 MCG/ACT Inhalation Aerosol Powder, Breath Activated INHALE 1 PUFF INTO THE LUNGS TWICE DAILY 60 each 0    levETIRAcetam  MG Oral Tablet 24 Hr Take 1 tablet (500 mg total) by mouth at bedtime. 90 tablet 3    levETIRAcetam  MG Oral Tablet 24 Hr Take 1 tablet (750 mg total) by mouth at bedtime. 90 tablet 3    albuterol 108 (90 Base) MCG/ACT Inhalation Aero Soln Inhale 2 puffs into the lungs every 4 (four) hours as needed for  Wheezing or Shortness of Breath. 6.7 g 3    Desogestrel-Ethinyl Estradiol (APRI) 0.15-30 MG-MCG Oral Tab Take 1 tablet by mouth daily. 84 tablet 3    Cholecalciferol (VITAMIN D) 1000 units Oral Tab Take 1,000 tablets by mouth daily.         HEALTH MAINTENANCE  Immunization History   Administered Date(s) Administered    Covid-19 Vaccine Pfizer 30 mcg/0.3 ml 02/04/2021, 02/24/2021    DTAP 09/11/1992, 09/11/1992, 11/02/1992, 11/02/1992, 01/05/1993, 01/05/1993, 12/30/1993, 12/30/1993, 07/25/1997, 07/25/1997    FLU VAC QIV SPLIT 3 YRS AND OLDER (08267) 10/14/2014, 01/04/2018, 10/03/2018    FLUZONE 6 months and older PFS 0.5 ml (03222) 10/14/2014, 11/08/2019    Flublok Quad Influenza Vaccine (87248) 10/02/2020, 10/02/2021    Flucelvax Influenza vaccine, trivalent (ccIIV3), 0.5mL IM 10/09/2023    HEP B 07/10/1992, 08/10/1992, 03/12/1993    HEP B Vaccine 07/10/1992, 08/10/1992, 03/12/1993    HEP B, Ped/Adol 07/10/1992, 08/10/1992, 03/12/1993    HIB 09/11/1992, 11/02/1992, 01/05/1993, 10/05/1993    Hib, Unspecified Formulation 09/11/1992, 11/02/1992, 01/05/1993, 10/05/1993    Influenza 10/28/1999, 01/02/2018    MMR 10/05/1993, 07/25/1997    OPV 09/11/1992, 11/02/1992, 01/05/1993, 12/30/1993, 07/25/1997    Pfizer Covid-19 Vaccine 30mcg/0.3ml 12yrs+ 10/09/2023    Pneumovax 23 10/03/2018    TD 03/02/2017    TDAP 01/04/2024    Tb Intradermal Test 08/11/2016       ALLERGIES AND DRUG REACTIONS  Allergies   Allergen Reactions    Amitriptyline FATIGUE, HALLUCINATION, OTHER (SEE COMMENTS), NAUSEA AND VOMITING and CONFUSION     Profound sleepiness.    Causes lethargy   Other reaction(s): FATIGUE   Strange dreams and sleeping too much   Profound sleepiness.    Severe fatique    Etanercept FATIGUE, OTHER (SEE COMMENTS) and NAUSEA AND VOMITING     Paralysis of leg   Other reaction(s): Weakness   Lower extremity weakness    Lower extremity weakness    Leg paralysis    Paralysis of leg   Other reaction(s): Weakness   Lower extremity  weakness   Lower extremity weakness       Family History   Problem Relation Age of Onset    Hypertension Mother     Stroke Paternal Grandmother     Cancer Neg     Diabetes Neg     Lipids Neg     Heart Disorder Neg      Social History     Socioeconomic History    Marital status:     Number of children: 0   Occupational History    Occupation: [employer] -    Tobacco Use    Smoking status: Never    Smokeless tobacco: Never    Tobacco comments:     Job:   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[employer], commutes, 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[employer]    Vaping Use    Vaping status: Never Used   Substance and Sexual Activity    Alcohol use: Not Currently    Drug use: No    Sexual activity: Not Currently     Partners: Male   Other Topics Concern    Caffeine Concern Yes     Comment: 1/2 coffee a couple times a week    Exercise No     Social Determinants of Health     Financial Resource Strain: Low Risk  (3/26/2024)    Financial Resource Strain     Difficulty of Paying Living Expenses: Not hard at all     Med Affordability: No   Food Insecurity: No Food Insecurity (3/26/2024)    Food Insecurity     Food Insecurity: Never true   Transportation Needs: No Transportation Needs (3/26/2024)    Transportation Needs     Lack of Transportation: No   Stress: No Stress Concern Present (3/26/2024)    Stress     Feeling of Stress : No   Housing Stability: Low Risk  (3/26/2024)    Housing Stability     Housing Instability: No       Review of Systems   Respiratory:  Positive for cough.    Cardiovascular:  Positive for chest pain.   Gastrointestinal:  Positive for nausea.   All other systems reviewed and are negative.         Objective:      /70   Pulse 113   Temp 97.3 °F (36.3 °C) (Temporal)   Resp 18   Ht 5' 7\" (1.702 m)   Wt 152 lb 4 oz (69.1 kg)   LMP 09/25/2024 (Exact Date)   SpO2 98%   BMI 23.85 kg/m²   Body mass index is 23.85 kg/m².    Physical Exam  Vitals reviewed.   Constitutional:       General: She is not in  acute distress.     Appearance: She is not ill-appearing, toxic-appearing or diaphoretic.   HENT:      Head: Normocephalic and atraumatic.      Mouth/Throat:      Mouth: Mucous membranes are moist.      Pharynx: Oropharynx is clear. No oropharyngeal exudate or posterior oropharyngeal erythema.   Eyes:      General: No scleral icterus.        Right eye: No discharge.         Left eye: No discharge.      Extraocular Movements: Extraocular movements intact.      Conjunctiva/sclera: Conjunctivae normal.   Cardiovascular:      Rate and Rhythm: Regular rhythm. Tachycardia present.      Heart sounds: Normal heart sounds.   Pulmonary:      Effort: Pulmonary effort is normal.      Breath sounds: Normal breath sounds.   Abdominal:      General: Abdomen is flat. There is no distension.      Palpations: Abdomen is soft. There is no mass.      Tenderness: There is no abdominal tenderness. There is no guarding or rebound.      Hernia: No hernia is present.   Musculoskeletal:      Cervical back: Neck supple. No tenderness.      Right lower leg: No edema.      Left lower leg: No edema.   Lymphadenopathy:      Cervical: No cervical adenopathy.   Skin:     General: Skin is warm and dry.      Coloration: Skin is not jaundiced or pale.      Findings: No bruising, erythema or rash.   Neurological:      Mental Status: She is alert and oriented to person, place, and time.   Psychiatric:         Mood and Affect: Mood normal.            Assessment and Plan:      1. Acute cough (Primary)  -     XR CHEST PA + LAT CHEST (CPT=71046); Future; Expected date: 10/01/2024  -     Benzonatate; Take 1 capsule (100 mg total) by mouth 3 (three) times daily as needed for cough. SWALLOW CAPSULE WHOLE. DO NOT BREAK, CUT, CRUSH, CHEW, OR DISSOLVE CAPSULE.  Dispense: 30 capsule; Refill: 0  2. Chest pain, unspecified type  -     XR CHEST PA + LAT CHEST (CPT=71046); Future; Expected date: 10/01/2024  3. Tachycardia  -     XR CHEST PA + LAT CHEST (CPT=71046);  Future; Expected date: 10/01/2024  4. Nausea  -     Ondansetron HCl; Take 1 tablet (4 mg total) by mouth every 8 (eight) hours as needed for Nausea.  Dispense: 20 tablet; Refill: 1  5. Dehydration  6. Bronchitis  -     methylPREDNISolone; Take by mouth as directed  Dispense: 21 each; Refill: 0  7. Costochondritis    Return if symptoms worsen or fail to improve.    - likely due to pneumonia vs bronchitis  - recommended a CXR for further evaluation. Choice of steroids or antibiotics pending CXR results  - we discussed that she may take tessalon perles as needed for cough and zofran as needed for nausea  - recommended continuation of her albuterol inhaler as needed for wheezing, SOB, or coughing fits  - she is likely tachycardic due to her infection and dehydration. Recommended increasing her fluid intake    Patient verbalized understanding of assessment and recommendations. All questions and concerns were addressed.    Electronically signed by Loretta Pickard MD      Addendum 10/1/24:   - CXR result was WNL, indicating that her symptoms are most likely due to bronchitis and costochondritis. Recommended a course of steroids as prescribed

## 2024-10-01 NOTE — PROGRESS NOTES
Faisal Massey,     Your chest x-ray result is back and did not show any signs of pneumonia. Your symptoms are most likely due to bronchitis given these results, so I will send a prescription for a course of steroids. Please let me know if you have any questions about this result.     I hope you feel better soon!  Dr. Pickard

## 2024-10-03 ENCOUNTER — TELEPHONE (OUTPATIENT)
Dept: PHYSICAL THERAPY | Facility: HOSPITAL | Age: 34
End: 2024-10-03

## 2024-10-03 ENCOUNTER — APPOINTMENT (OUTPATIENT)
Dept: PHYSICAL THERAPY | Age: 34
End: 2024-10-03
Attending: OBSTETRICS & GYNECOLOGY
Payer: COMMERCIAL

## 2024-10-08 ENCOUNTER — APPOINTMENT (OUTPATIENT)
Dept: PHYSICAL THERAPY | Age: 34
End: 2024-10-08
Attending: OBSTETRICS & GYNECOLOGY
Payer: COMMERCIAL

## 2024-10-09 ENCOUNTER — APPOINTMENT (OUTPATIENT)
Dept: PHYSICAL THERAPY | Age: 34
End: 2024-10-09
Attending: OBSTETRICS & GYNECOLOGY
Payer: COMMERCIAL

## 2024-10-09 ENCOUNTER — TELEMEDICINE (OUTPATIENT)
Dept: FAMILY MEDICINE CLINIC | Facility: CLINIC | Age: 34
End: 2024-10-09
Payer: COMMERCIAL

## 2024-10-09 ENCOUNTER — PATIENT MESSAGE (OUTPATIENT)
Dept: FAMILY MEDICINE CLINIC | Facility: CLINIC | Age: 34
End: 2024-10-09

## 2024-10-09 DIAGNOSIS — J40 BRONCHITIS: ICD-10-CM

## 2024-10-09 DIAGNOSIS — R05.9 COUGH, UNSPECIFIED TYPE: ICD-10-CM

## 2024-10-09 DIAGNOSIS — R05.9 COUGH, UNSPECIFIED TYPE: Primary | ICD-10-CM

## 2024-10-09 PROCEDURE — 99214 OFFICE O/P EST MOD 30 MIN: CPT | Performed by: FAMILY MEDICINE

## 2024-10-09 RX ORDER — METHYLPREDNISOLONE 4 MG
TABLET, DOSE PACK ORAL
Qty: 1 EACH | Refills: 0 | Status: SHIPPED | OUTPATIENT
Start: 2024-10-09

## 2024-10-09 RX ORDER — AZITHROMYCIN 250 MG/1
TABLET, FILM COATED ORAL
Qty: 6 TABLET | Refills: 0 | Status: SHIPPED | OUTPATIENT
Start: 2024-10-09 | End: 2024-10-13

## 2024-10-09 RX ORDER — CODEINE PHOSPHATE AND GUAIFENESIN 10; 100 MG/5ML; MG/5ML
5 SOLUTION ORAL EVERY 6 HOURS PRN
Qty: 118 ML | Refills: 0 | Status: SHIPPED | OUTPATIENT
Start: 2024-10-09 | End: 2024-10-10

## 2024-10-09 NOTE — PROGRESS NOTES
Subjective:   Patient ID: Shilpa Lopez is a 34 year old female.    HPI  Ms. Lopez is a pleasant 34-year-old female with history of asthma, bronchitis presenting for video visit today for persistent cough.  Cough is likely productive of phlegm and incessant.  She usually gets bronchitis around October.  She has been prescribed Medrol Dosepak and would usually need a second round of Medrol dose pack to help with her symptoms.  She was prescribed Tessalon Perles to help with cough suppression but has not been better.  In the past she also was given antibiotics for bronchitis.  No fever no chest pain no shortness of breath no nausea no vomiting no abdominal pain. I had reviewed past medical and family histories together with allergy and medication lists documented.    History/Other:   Review of Systems   Constitutional:  Negative for fatigue and fever.   HENT:  Positive for congestion.    Respiratory:  Positive for cough. Negative for shortness of breath.    Cardiovascular:  Negative for chest pain.   Gastrointestinal:  Negative for abdominal pain, diarrhea, nausea and vomiting.     Current Outpatient Medications   Medication Sig Dispense Refill    azithromycin (ZITHROMAX Z-MEI) 250 MG Oral Tab Take 2 tablets (500 mg total) by mouth daily for 1 day, THEN 1 tablet (250 mg total) daily for 4 days. 6 tablet 0    methylPREDNISolone (MEDROL) 4 MG Oral Tablet Therapy Pack As directed. 1 each 0    guaiFENesin-codeine 100-10 MG/5ML Oral Solution Take 5 mL by mouth every 6 (six) hours as needed. 118 mL 0    benzonatate 100 MG Oral Cap Take 1 capsule (100 mg total) by mouth 3 (three) times daily as needed for cough. SWALLOW CAPSULE WHOLE. DO NOT BREAK, CUT, CRUSH, CHEW, OR DISSOLVE CAPSULE. 30 capsule 0    ondansetron (ZOFRAN) 4 mg tablet Take 1 tablet (4 mg total) by mouth every 8 (eight) hours as needed for Nausea. 20 tablet 1    methylPREDNISolone (MEDROL) 4 MG Oral Tablet Therapy Pack Take by mouth as directed 21 each 0     FLUTICASONE-SALMETEROL 100-50 MCG/ACT Inhalation Aerosol Powder, Breath Activated INHALE 1 PUFF INTO THE LUNGS TWICE DAILY 60 each 0    levETIRAcetam  MG Oral Tablet 24 Hr Take 1 tablet (500 mg total) by mouth at bedtime. 90 tablet 3    levETIRAcetam  MG Oral Tablet 24 Hr Take 1 tablet (750 mg total) by mouth at bedtime. 90 tablet 3    albuterol 108 (90 Base) MCG/ACT Inhalation Aero Soln Inhale 2 puffs into the lungs every 4 (four) hours as needed for Wheezing or Shortness of Breath. 6.7 g 3    Desogestrel-Ethinyl Estradiol (APRI) 0.15-30 MG-MCG Oral Tab Take 1 tablet by mouth daily. 84 tablet 3    Cholecalciferol (VITAMIN D) 1000 units Oral Tab Take 1,000 tablets by mouth daily.       Allergies:Allergies[1]    Objective:   Physical Exam  Constitutional:       General: She is not in acute distress.  Neurological:      Mental Status: She is alert.   Psychiatric:         Mood and Affect: Mood normal.         Behavior: Behavior normal.         Assessment & Plan:   1. Cough, unspecified type   -Possibly postviral versus allergic rhinitis/postnasal drip  - Will prescribe with guaifenesin with codeine as needed for cough suppression at this point  - Assured her our that this should get better in the next few days  - Keep hydrated   2. Bronchitis   -Will give another round of Medrol Dosepak  - Will add Z-Hola at this time which she had taken in the past for bronchitis which had helped  - Go to the emergency room with respiratory distress     Call or come in sooner if symptoms worsen or persist      This note was prepared using Dragon Medical voice recognition dictation software. As a result errors may occur. When identified these errors have been corrected. While every attempt is made to correct errors during dictation discrepancies may still exist          No orders of the defined types were placed in this encounter.      Meds This Visit:  Requested Prescriptions     Signed Prescriptions Disp Refills     azithromycin (ZITHROMAX Z-MEI) 250 MG Oral Tab 6 tablet 0     Sig: Take 2 tablets (500 mg total) by mouth daily for 1 day, THEN 1 tablet (250 mg total) daily for 4 days.    methylPREDNISolone (MEDROL) 4 MG Oral Tablet Therapy Pack 1 each 0     Sig: As directed.    guaiFENesin-codeine 100-10 MG/5ML Oral Solution 118 mL 0     Sig: Take 5 mL by mouth every 6 (six) hours as needed.       Imaging & Referrals:  None         [1]   Allergies  Allergen Reactions    Amitriptyline FATIGUE, HALLUCINATION, OTHER (SEE COMMENTS), NAUSEA AND VOMITING and CONFUSION     Profound sleepiness.    Causes lethargy   Other reaction(s): FATIGUE   Strange dreams and sleeping too much   Profound sleepiness.    Severe fatique    Etanercept FATIGUE, OTHER (SEE COMMENTS) and NAUSEA AND VOMITING     Paralysis of leg   Other reaction(s): Weakness   Lower extremity weakness    Lower extremity weakness    Leg paralysis    Paralysis of leg   Other reaction(s): Weakness   Lower extremity weakness   Lower extremity weakness

## 2024-10-10 RX ORDER — CODEINE PHOSPHATE AND GUAIFENESIN 10; 100 MG/5ML; MG/5ML
5 SOLUTION ORAL EVERY 6 HOURS PRN
Qty: 118 ML | Refills: 0 | Status: SHIPPED | OUTPATIENT
Start: 2024-10-10

## 2024-10-16 ENCOUNTER — OFFICE VISIT (OUTPATIENT)
Dept: PHYSICAL THERAPY | Age: 34
End: 2024-10-16
Attending: OBSTETRICS & GYNECOLOGY
Payer: COMMERCIAL

## 2024-10-16 PROCEDURE — 97110 THERAPEUTIC EXERCISES: CPT

## 2024-10-16 PROCEDURE — 97112 NEUROMUSCULAR REEDUCATION: CPT

## 2024-10-16 NOTE — PROGRESS NOTES
Diagnosis:   Pelvic floor weakness (N81.89)         Referring Provider: Larisa David  Date of Evaluation:    8/6/2024    Precautions:  Drug Allergy, Bring inhaler (asthma), Seizures - controlled Next MD visit:   none scheduled  Date of Surgery: n/a   Insurance Primary/Secondary: [employer] [place] HMO / N/A     # Auth Visits: 10           Subjective: Pt reports has not been dealing with pain/ pressure despite other illnesses & personal issues. No leakage with cough.    Pain: 0/10      Objective: See Flowsheet for details  9/17/2024:  TTP L PSIS  Mild L post ilial rot      9/10/2024:  Palpation: STRs with varied TTP R glutes, piriformis with no significant TTP TFL, lat quad      9/3/2024:  Flexibility: min restricted R & min-mod restricted L hip flexors  Palpation: ++TTP R psoas > +iliacus; mild discomfort/ TTP on the L iliacus & psoas      8/27/2024:  External Palpation: no tenderness to B hip Adductors; TTP & STRs R glutes/ piriformis  Abdominal Wall Integrity: good tension through linea alba with mild increased laxity at umbilicus  Diastasis Recti: (finger width depth while contracted)  Above Umbilicus: 0  Umbilicus: 0-1  Below Umbilicus: 0  Mild coning  Visual descent of tissue with abdominal bracing    Strength (MMT): Hip Abduction: R 3/5*, L 4-/5    Flexibility Summary: WNL BAILEY LE except min restricted B HS, min restricted L piriformis & mod restricted R piriformis      Assessment: Pt returns after small gap in care. Emphasis on spinal mobility & postural work especially in light of prior illness. Pt reported mild lower abdominal/ pelvic pressure at end of visit. Will cont to monitor & re-assess. No pain with TX.      Goals: (to be met in 10 visits)  Patient demonstrates efficiency with Levator Ani/ Transverse Abdominis (Pelvic Brace) contraction for ability to resume desired exercise.  Patient consistently demonstrates Levator Ani contraction inverse to diaphragmatic breathing to allow for pelvic brace with ADLs  without valsalva for reduced pressure symptoms with walking.  Patient presents with resolved myofascial pelvic floor soft tissue restrictions & pain for reduced discomfort including LBP with ability to sleep at night without disruption.  Patient reports the ability to keep tampons in without falling out.  Patient understands importance of performing HEP to prevent reoccurrence of symptoms.     Plan: Cont strengthening & stretching activities. Explore spinal mobility work (manual & active). Reduce pressure: pelvic brace progressions  Date: 8/27/2024  TX#: 2/10 Date: 9/3/2024  TX#: 3/10 Date: 9/10/2024  TX#: 4/10 Date: 9/17/2024  TX#: 5/10 Date: 10/16/2024  TX#: 6/10   Therex: 40'  Cont assessment: obj measures (see above)  Figure-4 piriformis stretch supine 1 X 30\"  ea & seated 1 X 30\" ea   Sleep positions: pillow between knees  Education: muscle tightness/ tension & weakness connection  Hooklying hip Abd Anuja with Blue TB 5\" X 10 Therex: 40'  Cont obj measures  Modified mitchell stretch tableside hip flexor stretch RLE X 3' - HEP  Hooklying PPT 3 X 10 - HEP  S/L hip Abd 2 X 10 B - HEP  Bridge with hip Add anuja with yellow ball 3 X 10 - HEP  Flat back stretch 3 X 30\" tableside - HEP  Hip Add stretch 3 X 30\" ea tableside - HEP  Instruction in standing hip flexor stretches with practice - HEP   Therex: 30'  1/2 FR stretch supine X 5'  Open the book stretch X 10 ea  Thread the needle with reach up/ across X 10 ea  Standing sport cord elevated row 2 X 15 blue  Standing sport cord shld ext 2 X 15 green  Doorway pec stretch 3 X 30\" B     Neuro Re-ed: 23'  Education for HEP: Pelvic elevation: 1-2 pillows, BLE in 90/90 position for gravity assist. Up to 10'  TA bracing instruction, with ADLs - HEP  Pelvic brace instruction, with ADLs - HEP  Standing posture: stacking ribcage over pelvis. Avoid hip pop, arching Neuro Re-ed: 45'  MET techniques (manual)  -\"reset\". Pt may perform hip Abd/ Add (\"shotgun\") technique for home  prn  TA bracing  -supine, standing. In front of mirror: visual cueing, verbal cueing, manual cueing  -video: correct & incorrect form  Canister: stack ribcage over pelvis (video). Zipping  Connection between pressure: increasing pelvic & abdominal stability: progressions. Ok to trial row machine at home: with bracing Neuro Re-ed: 8'  Quadruped Diaphragmatic Breathing X 3'  Quadruped pelvic brace 2 X 10     Manual Therapy: 19'  STM: R glutes, piriformis, TFL, lat quad (sidelying & supine)  -education in potential of normal soreness, ecchymosis     HEP: Coordinating PFM with Breathing  8/27/2024: piriformis stretches supine & seated, hooklying hip abduction isometric with Blue TheraBand  Access Code: [email or web address]  URL: [email or web address]  Date: 09/03/2024  Prepared by: Kassandra Randhawa    Exercises  - Standing Hip Flexor Stretch  - 1-2 x daily - 7 x weekly - 3 sets - 30 sec hold  - Sidelying Hip Abduction  - 1 x daily - 4-5 x weekly - 2 sets - 10 reps  - Modified Dean Stretch  - 1-2 x daily - 7 x weekly - 1 sets - 10 reps - 5 sec hold  - Supine Bridge with Mini Swiss Ball Between Knees  - 1 x daily - 4-5 x weekly - 3 sets - 10 reps - 3-5 sec hold  - Standing 'L' Stretch at Counter  - 1-2 x daily - 7 x weekly - 3 sets - 30 sec hold    9/10/2024: TA bracing, Pelvic brace, pelvic elevation prn for gravity assist    Charges: Therex X 2, Neuro X 1      Total Timed Treatment: 38 min  Total Treatment Time: 38 min

## 2024-10-23 ENCOUNTER — OFFICE VISIT (OUTPATIENT)
Dept: PHYSICAL THERAPY | Age: 34
End: 2024-10-23
Attending: OBSTETRICS & GYNECOLOGY
Payer: COMMERCIAL

## 2024-10-23 PROCEDURE — 97112 NEUROMUSCULAR REEDUCATION: CPT

## 2024-10-23 PROCEDURE — 97110 THERAPEUTIC EXERCISES: CPT

## 2024-10-23 PROCEDURE — 97140 MANUAL THERAPY 1/> REGIONS: CPT

## 2024-10-23 NOTE — PROGRESS NOTES
Diagnosis:   Pelvic floor weakness (N81.89)         Referring Provider: Larisa David  Date of Evaluation:    8/6/2024    Precautions:  Drug Allergy, Bring inhaler (asthma), Seizures - controlled Next MD visit:   none scheduled  Date of Surgery: n/a   Insurance Primary/Secondary: [employer] [place] HMO / N/A     # Auth Visits: 10           Subjective: Pt reports hard to tell if pressure is better or if used to it. Last menses- better with tampon wear. Ok with walk - a little pressure, but not enough to stop.    Pain: 0/10      Objective: See Flowsheet for details  10/23/2024: joint mobility: hypomob thoracic > lumbar central PA spring testing      Assessment: Cont with work to promote proper pressure management. Mild knee pain with 50# shuttle reduced when down to 37#. Advised in potential of normal soreness from adding in spinal mobs.      Goals: (to be met in 10 visits)  Patient demonstrates efficiency with Levator Ani/ Transverse Abdominis (Pelvic Brace) contraction for ability to resume desired exercise.  Patient consistently demonstrates Levator Ani contraction inverse to diaphragmatic breathing to allow for pelvic brace with ADLs without valsalva for reduced pressure symptoms with walking.  Patient presents with resolved myofascial pelvic floor soft tissue restrictions & pain for reduced discomfort including LBP with ability to sleep at night without disruption.  Patient reports the ability to keep tampons in without falling out.  Patient understands importance of performing HEP to prevent reoccurrence of symptoms.     Plan: Cont strengthening & stretching activities. Explore spinal mobility work (manual & active). Reduce pressure: pelvic brace progressions  Date: 8/27/2024  TX#: 2/10 Date: 9/3/2024  TX#: 3/10 Date: 9/10/2024  TX#: 4/10 Date: 9/17/2024  TX#: 5/10 Date: 10/16/2024  TX#: 6/10 Date: 10/23/2024  TX#: 7/10   Therex: 40'  Cont assessment: obj measures (see above)  Figure-4 piriformis stretch supine 1 X 30\"   ea & seated 1 X 30\" ea   Sleep positions: pillow between knees  Education: muscle tightness/ tension & weakness connection  Hooklying hip Abd Anuja with Blue TB 5\" X 10 Therex: 40'  Cont obj measures  Modified mitchell stretch tableside hip flexor stretch RLE X 3' - HEP  Hooklying PPT 3 X 10 - HEP  S/L hip Abd 2 X 10 B - HEP  Bridge with hip Add anuja with yellow ball 3 X 10 - HEP  Flat back stretch 3 X 30\" tableside - HEP  Hip Add stretch 3 X 30\" ea tableside - HEP  Instruction in standing hip flexor stretches with practice - HEP   Therex: 30'  1/2 FR stretch supine X 5'  Open the book stretch X 10 ea  Thread the needle with reach up/ across X 10 ea  Standing sport cord elevated row 2 X 15 blue  Standing sport cord shld ext 2 X 15 green  Doorway pec stretch 3 X 30\" B Therex: 12'  Fwd TM: incline 5.0; 1.8 mph. X 5' (for gravity assist)  LTR with BLE on SB 5\" X 10 ea     Neuro Re-ed: 23'  Education for HEP: Pelvic elevation: 1-2 pillows, BLE in 90/90 position for gravity assist. Up to 10'  TA bracing instruction, with ADLs - HEP  Pelvic brace instruction, with ADLs - HEP  Standing posture: stacking ribcage over pelvis. Avoid hip pop, arching Neuro Re-ed: 45'  MET techniques (manual)  -\"reset\". Pt may perform hip Abd/ Add (\"shotgun\") technique for home prn  TA bracing  -supine, standing. In front of mirror: visual cueing, verbal cueing, manual cueing  -video: correct & incorrect form  Canister: stack ribcage over pelvis (video). Zipping  Connection between pressure: increasing pelvic & abdominal stability: progressions. Ok to trial row machine at home: with bracing Neuro Re-ed: 8'  Quadruped Diaphragmatic Breathing X 3'  Quadruped pelvic brace 2 X 10 Neuro Re-ed: 15'  With pelvic brace breathing coordination:   -Shuttle DL press X 3' hip Add anuja with yellow ball 50#  -Shuttle DL press X 3' hip Abd anuja with Gray TB 37#  -SB bridge X 2'     Manual Therapy: 19'  STM: R glutes, piriformis, TFL, lat quad (sidelying &  supine)  -education in potential of normal soreness, ecchymosis   Manual Therapy: 13'  Thoracolumbar central PA mobs gr II-III: thoracic prone, lumbar GURWINDER   HEP: Coordinating PFM with Breathing  8/27/2024: piriformis stretches supine & seated, hooklying hip abduction isometric with Blue TheraBand  Access Code: [email or web address]  URL: [email or web address]  Date: 09/03/2024  Prepared by: Kassandra Randhawa    Exercises  - Standing Hip Flexor Stretch  - 1-2 x daily - 7 x weekly - 3 sets - 30 sec hold  - Sidelying Hip Abduction  - 1 x daily - 4-5 x weekly - 2 sets - 10 reps  - Modified Dean Stretch  - 1-2 x daily - 7 x weekly - 1 sets - 10 reps - 5 sec hold  - Supine Bridge with Mini Swiss Ball Between Knees  - 1 x daily - 4-5 x weekly - 3 sets - 10 reps - 3-5 sec hold  - Standing 'L' Stretch at Counter  - 1-2 x daily - 7 x weekly - 3 sets - 30 sec hold    9/10/2024: TA bracing, Pelvic brace, pelvic elevation prn for gravity assist    Charges: Therex X 1, Neuro X 1, Manual X 1     Total Timed Treatment: 40 min  Total Treatment Time: 40 min

## 2024-10-30 ENCOUNTER — OFFICE VISIT (OUTPATIENT)
Dept: PHYSICAL THERAPY | Age: 34
End: 2024-10-30
Attending: OBSTETRICS & GYNECOLOGY
Payer: COMMERCIAL

## 2024-10-30 PROCEDURE — 97110 THERAPEUTIC EXERCISES: CPT | Performed by: PHYSICAL THERAPIST

## 2024-10-30 PROCEDURE — 97140 MANUAL THERAPY 1/> REGIONS: CPT | Performed by: PHYSICAL THERAPIST

## 2024-10-30 NOTE — PROGRESS NOTES
Diagnosis:   Pelvic floor weakness (N81.89)         Referring Provider: Larisa David  Date of Evaluation:    8/6/2024    Precautions:  Drug Allergy, Bring inhaler (asthma), Seizures - controlled Next MD visit:   none scheduled  Date of Surgery: n/a   Insurance Primary/Secondary: [employer] [place] HMO / N/A     # Auth Visits: 10           Subjective: Pt reports increased LBP associated with holding baby in grocery store 45'. Some radiating pain into the lower pelvis & hips. Some pressure    Pain: 0/10      Objective: See Flowsheet for details  10/23/2024: joint mobility: hypomob thoracic > lumbar central PA spring testing      Assessment: Emphasis on manual TX to address LBP: pt reported feeling better with TX. Increased tension along B T/L PSM present. May benefit from thoracolumbar PSM release (with abdominal approximation).      Goals: (to be met in 10 visits)  Patient demonstrates efficiency with Levator Ani/ Transverse Abdominis (Pelvic Brace) contraction for ability to resume desired exercise.  Patient consistently demonstrates Levator Ani contraction inverse to diaphragmatic breathing to allow for pelvic brace with ADLs without valsalva for reduced pressure symptoms with walking.  Patient presents with resolved myofascial pelvic floor soft tissue restrictions & pain for reduced discomfort including LBP with ability to sleep at night without disruption.  Patient reports the ability to keep tampons in without falling out.  Patient understands importance of performing HEP to prevent reoccurrence of symptoms.     Plan: Cont strengthening & stretching activities. Explore spinal mobility work (manual & active). Reduce pressure: pelvic brace/ core work progressions. Consider standing hip Abd & Ext with TheraBand.  Date: 8/27/2024  TX#: 2/10 Date: 9/3/2024  TX#: 3/10 Date: 9/10/2024  TX#: 4/10 Date: 9/17/2024  TX#: 5/10 Date: 10/16/2024  TX#: 6/10 Date: 10/23/2024  TX#: 7/10 Date: 10/30/2024  TX#: 8/10   Therex: 40'  Cont  assessment: obj measures (see above)  Figure-4 piriformis stretch supine 1 X 30\"  ea & seated 1 X 30\" ea   Sleep positions: pillow between knees  Education: muscle tightness/ tension & weakness connection  Hooklying hip Abd Anuja with Blue TB 5\" X 10 Therex: 40'  Cont obj measures  Modified mitchell stretch tableside hip flexor stretch RLE X 3' - HEP  Hooklying PPT 3 X 10 - HEP  S/L hip Abd 2 X 10 B - HEP  Bridge with hip Add anuja with yellow ball 3 X 10 - HEP  Flat back stretch 3 X 30\" tableside - HEP  Hip Add stretch 3 X 30\" ea tableside - HEP  Instruction in standing hip flexor stretches with practice - HEP   Therex: 30'  1/2 FR stretch supine X 5'  Open the book stretch X 10 ea  Thread the needle with reach up/ across X 10 ea  Standing sport cord elevated row 2 X 15 blue  Standing sport cord shld ext 2 X 15 green  Doorway pec stretch 3 X 30\" B Therex: 12'  Fwd TM: incline 5.0; 1.8 mph. X 5' (for gravity assist)  LTR with BLE on SB 5\" X 10 ea Therex: 8'  Elliptical L4 X 4'  Flat back stretch 4 X 30\" tableside (after manual work)     Neuro Re-ed: 23'  Education for HEP: Pelvic elevation: 1-2 pillows, BLE in 90/90 position for gravity assist. Up to 10'  TA bracing instruction, with ADLs - HEP  Pelvic brace instruction, with ADLs - HEP  Standing posture: stacking ribcage over pelvis. Avoid hip pop, arching Neuro Re-ed: 45'  MET techniques (manual)  -\"reset\". Pt may perform hip Abd/ Add (\"shotgun\") technique for home prn  TA bracing  -supine, standing. In front of mirror: visual cueing, verbal cueing, manual cueing  -video: correct & incorrect form  Canister: stack ribcage over pelvis (video). Zipping  Connection between pressure: increasing pelvic & abdominal stability: progressions. Ok to trial row machine at home: with bracing Neuro Re-ed: 8'  Quadruped Diaphragmatic Breathing X 3'  Quadruped pelvic brace 2 X 10 Neuro Re-ed: 15'  With pelvic brace breathing coordination:   -Shuttle DL press X 3' hip Add anuja with  yellow ball 50#  -Shuttle DL press X 3' hip Abd chon with Gray TB 37#  -SB bridge X 2'      Manual Therapy: 19'  STM: R glutes, piriformis, TFL, lat quad (sidelying & supine)  -education in potential of normal soreness, ecchymosis   Manual Therapy: 13'  Thoracolumbar central PA mobs gr II-III: thoracic prone, lumbar GURWINDER Manual Therapy: 32'  Lumbar central PA mobs gr II-III: in lumbar GURWINDER  STM R/L T/L PSM in prone  Myofascial lumbar stretch/ distraction in prone  Supportive KT tape T/L PSM (inhibition) & across sacrum: Pt educated in proper wear time for tape (max of 3 days) including to take off if any pain and/ or irritation occurs. No direct heat over tape (no heating pad)   HEP: Coordinating PFM with Breathing  8/27/2024: piriformis stretches supine & seated, hooklying hip abduction isometric with Blue TheraBand  Access Code: [email or web address]  URL: [email or web address]  Date: 09/03/2024  Prepared by: Kassandra Randhawa    Exercises  - Standing Hip Flexor Stretch  - 1-2 x daily - 7 x weekly - 3 sets - 30 sec hold  - Sidelying Hip Abduction  - 1 x daily - 4-5 x weekly - 2 sets - 10 reps  - Modified Dean Stretch  - 1-2 x daily - 7 x weekly - 1 sets - 10 reps - 5 sec hold  - Supine Bridge with Mini Swiss Ball Between Knees  - 1 x daily - 4-5 x weekly - 3 sets - 10 reps - 3-5 sec hold  - Standing 'L' Stretch at Counter  - 1-2 x daily - 7 x weekly - 3 sets - 30 sec hold    9/10/2024: TA bracing, Pelvic brace, pelvic elevation prn for gravity assist    Charges: Therex X 1, Manual X 2     Total Timed Treatment: 40 min  Total Treatment Time: 40 min

## 2024-11-06 ENCOUNTER — OFFICE VISIT (OUTPATIENT)
Dept: PHYSICAL THERAPY | Age: 34
End: 2024-11-06
Attending: OBSTETRICS & GYNECOLOGY
Payer: COMMERCIAL

## 2024-11-06 PROCEDURE — 97110 THERAPEUTIC EXERCISES: CPT | Performed by: PHYSICAL THERAPIST

## 2024-11-06 NOTE — PROGRESS NOTES
Diagnosis:   Pelvic floor weakness (N81.89)         Referring Provider: Larisa David  Date of Evaluation:    8/6/2024    Precautions:  Drug Allergy, Bring inhaler (asthma), Seizures - controlled Next MD visit:   none scheduled  Date of Surgery: n/a   Insurance Primary/Secondary: [employer] [place] HMO / N/A     # Auth Visits: 10           Subjective: Pt reports back pain is better. Tape made back go numb - took off - felt felt after that. Cont to take some ibuprofen, CBD cream. Some pressure.     Pain: 0/10 sore vs pain      Objective: See Flowsheet for details  10/23/2024: joint mobility: hypomob thoracic > lumbar central PA spring testing      Assessment: Cues with resisted lat walking to avoid compensatory trunk lean. Sufficient muscle fatigue without increased pain. Pt progressing nicely throughout PT POC.      Goals: (to be met in 10 visits)  Patient demonstrates efficiency with Levator Ani/ Transverse Abdominis (Pelvic Brace) contraction for ability to resume desired exercise.  Patient consistently demonstrates Levator Ani contraction inverse to diaphragmatic breathing to allow for pelvic brace with ADLs without valsalva for reduced pressure symptoms with walking.  Patient presents with resolved myofascial pelvic floor soft tissue restrictions & pain for reduced discomfort including LBP with ability to sleep at night without disruption.  Patient reports the ability to keep tampons in without falling out. - improved  Patient understands importance of performing HEP to prevent reoccurrence of symptoms.     Plan: Consider standing hip Abd & Ext with TheraBand. Pt may be ready for d/c to HEP next visit.  Date: 9/10/2024  TX#: 4/10 Date: 9/17/2024  TX#: 5/10 Date: 10/16/2024  TX#: 6/10 Date: 10/23/2024  TX#: 7/10 Date: 10/30/2024  TX#: 8/10 Date: 11/6/2024  TX#: 9/10     Therex: 30'  1/2 FR stretch supine X 5'  Open the book stretch X 10 ea  Thread the needle with reach up/ across X 10 ea  Standing sport cord elevated row 2  X 15 blue  Standing sport cord shld ext 2 X 15 green  Doorway pec stretch 3 X 30\" B Therex: 12'  Fwd TM: incline 5.0; 1.8 mph. X 5' (for gravity assist)  LTR with BLE on SB 5\" X 10 ea Therex: 8'  Elliptical L4 X 4'  Flat back stretch 4 X 30\" tableside (after manual work) Therex: 40'  Elliptical L4 X 6'  CW Red cuff loop 6 X 30 ft  MW Red cuff loop 6 X 30 ft  Flat back stretch at // bars 3 X 30\" ea  Seated piriformis stretch 3 X 30\" ea  Sport cord woodchops X 20 ea (double green)   Neuro Re-ed: 23'  Education for HEP: Pelvic elevation: 1-2 pillows, BLE in 90/90 position for gravity assist. Up to 10'  TA bracing instruction, with ADLs - HEP  Pelvic brace instruction, with ADLs - HEP  Standing posture: stacking ribcage over pelvis. Avoid hip pop, arching Neuro Re-ed: 45'  MET techniques (manual)  -\"reset\". Pt may perform hip Abd/ Add (\"shotgun\") technique for home prn  TA bracing  -supine, standing. In front of mirror: visual cueing, verbal cueing, manual cueing  -video: correct & incorrect form  Canister: stack ribcage over pelvis (video). Zipping  Connection between pressure: increasing pelvic & abdominal stability: progressions. Ok to trial row machine at home: with bracing Neuro Re-ed: 8'  Quadruped Diaphragmatic Breathing X 3'  Quadruped pelvic brace 2 X 10 Neuro Re-ed: 15'  With pelvic brace breathing coordination:   -Shuttle DL press X 3' hip Add chon with yellow ball 50#  -Shuttle DL press X 3' hip Abd chon with Gray TB 37#  -SB bridge X 2'     Manual Therapy: 19'  STM: R glutes, piriformis, TFL, lat quad (sidelying & supine)  -education in potential of normal soreness, ecchymosis   Manual Therapy: 13'  Thoracolumbar central PA mobs gr II-III: thoracic prone, lumbar GURWINDER Manual Therapy: 32'  Lumbar central PA mobs gr II-III: in lumbar GURWINDER  STM R/L T/L PSM in prone  Myofascial lumbar stretch/ distraction in prone  Supportive KT tape T/L PSM (inhibition) & across sacrum: Pt educated in proper wear time for tape  (max of 3 days) including to take off if any pain and/ or irritation occurs. No direct heat over tape (no heating pad)    HEP: Coordinating PFM with Breathing  8/27/2024: piriformis stretches supine & seated, hooklying hip abduction isometric with Blue TheraBand  Access Code: [email or web address]  URL: [email or web address]  Date: 09/03/2024  Prepared by: Kassandra Randhawa    Exercises  - Standing Hip Flexor Stretch  - 1-2 x daily - 7 x weekly - 3 sets - 30 sec hold  - Sidelying Hip Abduction  - 1 x daily - 4-5 x weekly - 2 sets - 10 reps  - Modified Dean Stretch  - 1-2 x daily - 7 x weekly - 1 sets - 10 reps - 5 sec hold  - Supine Bridge with Mini Swiss Ball Between Knees  - 1 x daily - 4-5 x weekly - 3 sets - 10 reps - 3-5 sec hold  - Standing 'L' Stretch at Counter  - 1-2 x daily - 7 x weekly - 3 sets - 30 sec hold    9/10/2024: TA bracing, Pelvic brace, pelvic elevation prn for gravity assist    Charges: Therex X 3     Total Timed Treatment: 40 min  Total Treatment Time: 40 min

## 2024-11-11 ENCOUNTER — TELEPHONE (OUTPATIENT)
Age: 34
End: 2024-11-11

## 2024-11-11 NOTE — TELEPHONE ENCOUNTER
Kassandra Rodriguez , NAEEM at [facility] (will know more about her availability by tomorrow)  [address]  [address]  [place] [address]  [phone]      External Providers you have to contact directly to schedule with:    Yu Alva LCSW at [facility] for counseling ([place] location also)  [id number] [place] [address] [phone] (Call/Text)    Roula Tempel ([facility])  [address], [place], [address] [phone] ext [phone]    Ruth Ann Omer M.A., LC, CAD  [address] [address] [phone]    Raegan aWtkins, MSW, LCSW at [facility]  Individual, Couple, and Family Therapy  Phone: [phone]

## 2024-11-20 ENCOUNTER — APPOINTMENT (OUTPATIENT)
Dept: PHYSICAL THERAPY | Age: 34
End: 2024-11-20
Attending: OBSTETRICS & GYNECOLOGY
Payer: COMMERCIAL

## 2024-12-02 ENCOUNTER — LAB ENCOUNTER (OUTPATIENT)
Dept: LAB | Age: 34
End: 2024-12-02
Attending: Other
Payer: COMMERCIAL

## 2024-12-02 DIAGNOSIS — Z79.899 ENCOUNTER FOR LONG-TERM (CURRENT) DRUG USE: ICD-10-CM

## 2024-12-02 PROCEDURE — 36415 COLL VENOUS BLD VENIPUNCTURE: CPT

## 2024-12-02 PROCEDURE — 80177 DRUG SCRN QUAN LEVETIRACETAM: CPT

## 2024-12-04 LAB — LEVETIRACETAM LVL: 7.3 UG/ML

## 2024-12-06 ENCOUNTER — HOSPITAL ENCOUNTER (OUTPATIENT)
Age: 34
Discharge: HOME | End: 2024-12-06
Payer: COMMERCIAL

## 2024-12-06 ENCOUNTER — APPOINTMENT (OUTPATIENT)
Dept: GENERAL RADIOLOGY | Age: 34
End: 2024-12-06
Attending: NURSE PRACTITIONER
Payer: COMMERCIAL

## 2024-12-06 ENCOUNTER — PATIENT MESSAGE (OUTPATIENT)
Dept: NEUROLOGY | Facility: CLINIC | Age: 34
End: 2024-12-06

## 2024-12-06 VITALS
TEMPERATURE: 98 F | BODY MASS INDEX: 23.54 KG/M2 | SYSTOLIC BLOOD PRESSURE: 121 MMHG | OXYGEN SATURATION: 98 % | HEIGHT: 67 IN | HEART RATE: 85 BPM | RESPIRATION RATE: 18 BRPM | WEIGHT: 150 LBS | DIASTOLIC BLOOD PRESSURE: 64 MMHG

## 2024-12-06 DIAGNOSIS — U07.1 COVID-19: Primary | ICD-10-CM

## 2024-12-06 LAB
POCT INFLUENZA A: NEGATIVE
POCT INFLUENZA B: NEGATIVE
SARS-COV-2 RNA RESP QL NAA+PROBE: DETECTED

## 2024-12-06 PROCEDURE — 99214 OFFICE O/P EST MOD 30 MIN: CPT

## 2024-12-06 PROCEDURE — 87502 INFLUENZA DNA AMP PROBE: CPT | Performed by: NURSE PRACTITIONER

## 2024-12-06 PROCEDURE — 71046 X-RAY EXAM CHEST 2 VIEWS: CPT | Performed by: NURSE PRACTITIONER

## 2024-12-06 RX ORDER — CODEINE PHOSPHATE AND GUAIFENESIN 10; 100 MG/5ML; MG/5ML
5 SOLUTION ORAL EVERY 6 HOURS PRN
Qty: 237 ML | Refills: 0 | Status: SHIPPED | OUTPATIENT
Start: 2024-12-06

## 2024-12-06 RX ORDER — LEVETIRACETAM 750 MG/1
1500 TABLET, FILM COATED, EXTENDED RELEASE ORAL NIGHTLY
COMMUNITY
Start: 2024-12-06

## 2024-12-06 RX ORDER — PREDNISONE 20 MG/1
40 TABLET ORAL DAILY
Qty: 10 TABLET | Refills: 1 | Status: SHIPPED | OUTPATIENT
Start: 2024-12-06 | End: 2024-12-11

## 2024-12-06 RX ORDER — ALBUTEROL SULFATE 90 UG/1
2 INHALANT RESPIRATORY (INHALATION) EVERY 4 HOURS PRN
Qty: 18 G | Refills: 0 | Status: SHIPPED | OUTPATIENT
Start: 2024-12-06

## 2024-12-06 RX ORDER — NIRMATRELVIR AND RITONAVIR 300-100 MG
KIT ORAL
Qty: 30 TABLET | Refills: 0 | Status: SHIPPED | OUTPATIENT
Start: 2024-12-06 | End: 2024-12-11

## 2024-12-06 NOTE — TELEPHONE ENCOUNTER
Discussed Keppra level with Dr Malin who recommends patient continue on same dose at this time.   Message routed to Dr Monge.

## 2024-12-06 NOTE — ED PROVIDER NOTES
Patient Seen in: [facility]      History     Chief Complaint   Patient presents with    Cough/URI     Stated Complaint: cough / chest tightness    Subjective:   34-year-old female presents to immediate care for cough, congestion, body aches.  Patient states that symptoms started last night she does work in an elementary school and has a history of asthma.        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    Extrinsic asthma, unspecified    sports induced    GERD (gastroesophageal reflux disease)    Hematuria    weird presentation of ibs    History of gross hematuria    History of recurrent UTIs    Interstitial cystitis    Irritable bowel    Knee pain    MRSA infection    Eye    Other and unspecified ovarian cysts    Pain in both knees, unspecified chronicity    Pelvic floor dysfunction    Pelvic inflammatory disease    PONV (postoperative nausea and vomiting)    nausea    Right ankle pain, unspecified chronicity    Right rotator cuff tendonitis    Seizure disorder ([facility])    Sees Dr. Lin- last seizure November 2015. seizures since around age 12 with events of LOC without shaking. In 2016 (age 25) she had another event of LOC with urinary incontinence without shaking. Was told EEG abnormal at this time. Started LEV and had no recurrent events of this type.              Past Surgical History:   Procedure Laterality Date    Colonoscopy  02/01/2012    nl ti, nl colon, nl random colon bx.    Colonoscopy N/A 05/21/2018    Procedure: COLONOSCOPY;  Surgeon: Henry Carpio MD;  Location:  ENDOSCOPY    Other surgical history  03/30/2012    Cystoscopy - Dr. Vidal     Other  surgical history  01/01/2012    Negative - ab. 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[employer], commutes, 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[employer]    Vaping Use    Vaping status: Never Used   Substance and Sexual Activity    Alcohol use: Not Currently    Drug use: No    Sexual activity: Not Currently     Partners: Male   Other Topics Concern    Caffeine Concern Yes     Comment: 1/2 coffee a couple times a week    Exercise No     Social Drivers of Health     Financial Resource Strain: Low Risk  (3/26/2024)    Financial Resource Strain     Difficulty of Paying Living Expenses: Not hard at all     Med Affordability: No   Food Insecurity: No Food Insecurity (3/26/2024)    Food Insecurity     Food Insecurity: Never true   Transportation Needs: No Transportation Needs (3/26/2024)    Transportation Needs     Lack of Transportation: No   Stress: No Stress Concern Present (3/26/2024)    Stress     Feeling of Stress : No   Housing Stability: Low Risk  (3/26/2024)    Housing Stability     Housing Instability: No              Review of Systems   Constitutional: Negative.    Respiratory: Negative.     Cardiovascular: Negative.    Gastrointestinal: Negative.    Skin: Negative.    Neurological: Negative.        Positive for stated complaint: cough / chest tightness  Other systems are as noted in HPI.  Constitutional and vital signs reviewed.      All other systems reviewed and negative except as noted above.    Physical Exam     ED Triage  Vitals [12/06/24 1109]   /64   Pulse 85   Resp 18   Temp 98 °F (36.7 °C)   Temp src Oral   SpO2 98 %   O2 Device None (Room air)       Current Vitals:   Vital Signs  BP: 121/64  Pulse: 85  Resp: 18  Temp: 98 °F (36.7 °C)  Temp src: Oral    Oxygen Therapy  SpO2: 98 %  O2 Device: None (Room air)        Physical Exam  Vitals and nursing note reviewed.   Constitutional:       General: She is not in acute distress.  HENT:      Head: Normocephalic.   Cardiovascular:      Rate and Rhythm: Normal rate.   Pulmonary:      Effort: Pulmonary effort is normal.   Musculoskeletal:         General: Normal range of motion.   Skin:     General: Skin is warm and dry.   Neurological:      General: No focal deficit present.      Mental Status: She is alert and oriented to person, place, and time.           ED Course     Labs Reviewed   RAPID SARS-COV-2 BY PCR - Abnormal; Notable for the following components:       Result Value    Rapid SARS-CoV-2 by PCR Detected (*)     All other components within normal limits   POCT FLU TEST - Normal    Narrative:     This assay is a rapid molecular in vitro test utilizing nucleic acid amplification of influenza A and B viral RNA.   XR CHEST PA + LAT CHEST (CPT=71046)    Result Date: 12/6/2024  PROCEDURE:  XR CHEST PA + LAT CHEST (CPT=71046)  INDICATIONS:  cough / chest tightness  COMPARISON:  [facility], XR, XR CHEST PA + LAT CHEST (CPT=71046), 10/01/2024, 3:05 PM.  TECHNIQUE:  PA and lateral chest radiographs were obtained.  PATIENT STATED HISTORY: (As transcribed by Technologist)  cough and chest tightness since yesterday    FINDINGS:  Heart size is within normal limits.  Pleural spaces appear clear.  Mediastinal and hilar contours are normal.  No focal consolidation.  If clinical symptoms persist then recommend follow-up imaging.            CONCLUSION:  See above.   LOCATION:  [facility]   Dictated by (CST): Khalif Allison MD on 12/06/2024 at 11:45 AM     Finalized by (CST): Khalif Allison MD on  12/06/2024 at 11:45 AM           [facility]      Medical Decision Making  Pertinent Labs & Imaging studies reviewed. (See chart for details).  Patient coming in with cough, congestion, body aches.   Differential diagnosis includes pneumonia, viral URI, COVID  Will discharge on Paxlovid, inhaler.   Patient is comfortable with this plan.     Overall Pt looks good. Non-toxic, well-hydrated and in no respiratory distress. Vital signs are reassuring. Exam is reassuring. I do not believe pt requires and additional diagnostic studies or intervention. I believe pt can be discharged home to continue evaluation as an outpatient. Follow-up provider given. Discharge instructions given and reviewed. Return for any problems. All understand and agrees with the plan.        Problems Addressed:  COVID-19: acute illness or injury    Amount and/or Complexity of Data Reviewed  Labs: ordered. Decision-making details documented in ED Course.  Radiology: ordered and independent interpretation performed. Decision-making details documented in ED Course.     Details: I reviewed the images and my independent interpreation after review is no acute consolidation. Additionaly, I reviewd the radiology report as noted in ed course        Disposition and Plan     Clinical Impression:  1. COVID-19         Disposition:  Discharge  12/6/2024 12:18 pm    Follow-up:  Loretta Pickard MD  [address] [address] [place] [address]  [phone]                Medications Prescribed:  Discharge Medication List as of 12/6/2024 11:56 AM        START taking these medications    Details   !! albuterol 108 (90 Base) MCG/ACT Inhalation Aero Soln Inhale 2 puffs into the lungs every 4 (four) hours as needed for Wheezing., Normal, Disp-18 g, R-0      predniSONE 20 MG Oral Tab Take 2 tablets (40 mg total) by mouth daily for 5 days., Normal, Disp-10 tablet, R-1      nirmatrelvir-ritonavir (PAXLOVID, 300/100,) 300-100 MG Oral Tablet Therapy Pack Take two  nirmatrelvir tablets (300mg) with one ritonavir tablet (100mg) together twice daily for 5 days., Normal, Disp-30 tablet, R-0      !! guaiFENesin-codeine 100-10 MG/5ML Oral Solution Take 5 mL by mouth every 6 (six) hours as needed for cough., Normal, Disp-237 mL, R-0       !! - Potential duplicate medications found. Please discuss with provider.              Supplementary Documentation:

## 2024-12-06 NOTE — DISCHARGE INSTRUCTIONS
Clear liquids, increased fluid intake  Fever control or pain, you may use ibuprofen, or acetaminophen as directed    Return for respiratory distress, productive cough, vomiting, or any other changes in your condition    Rest  CDC recommends staying home until you are symptom-free for 24 hours without medications.

## 2024-12-06 NOTE — TELEPHONE ENCOUNTER
Spoke with patient and informed her Dr Monge had just responded to the Telekene[employer] message and he would like her to increase Keppra to 1500mg nightly.   Rx Keppra updated in Epic historically.

## 2024-12-27 ENCOUNTER — HOSPITAL ENCOUNTER (OUTPATIENT)
Age: 34
Discharge: HOME | End: 2024-12-27
Payer: COMMERCIAL

## 2024-12-27 VITALS
OXYGEN SATURATION: 99 % | BODY MASS INDEX: 23.54 KG/M2 | SYSTOLIC BLOOD PRESSURE: 109 MMHG | RESPIRATION RATE: 16 BRPM | HEIGHT: 67 IN | WEIGHT: 150 LBS | HEART RATE: 90 BPM | TEMPERATURE: 98 F | DIASTOLIC BLOOD PRESSURE: 71 MMHG

## 2024-12-27 DIAGNOSIS — R05.9 COUGH, UNSPECIFIED TYPE: ICD-10-CM

## 2024-12-27 DIAGNOSIS — J06.9 VIRAL UPPER RESPIRATORY TRACT INFECTION WITH COUGH: Primary | ICD-10-CM

## 2024-12-27 LAB — S PYO AG THROAT QL IA.RAPID: NEGATIVE

## 2024-12-27 PROCEDURE — 99213 OFFICE O/P EST LOW 20 MIN: CPT

## 2024-12-27 PROCEDURE — 87651 STREP A DNA AMP PROBE: CPT | Performed by: PHYSICIAN ASSISTANT

## 2024-12-27 RX ORDER — CODEINE PHOSPHATE AND GUAIFENESIN 10; 100 MG/5ML; MG/5ML
5 SOLUTION ORAL EVERY 6 HOURS PRN
Qty: 118 ML | Refills: 0 | Status: SHIPPED | OUTPATIENT
Start: 2024-12-27 | End: 2025-01-02

## 2024-12-27 RX ORDER — BENZONATATE 100 MG/1
100 CAPSULE ORAL 3 TIMES DAILY PRN
Qty: 30 CAPSULE | Refills: 0 | Status: SHIPPED | OUTPATIENT
Start: 2024-12-27 | End: 2025-01-02 | Stop reason: DRUGHIGH

## 2024-12-27 NOTE — DISCHARGE INSTRUCTIONS
Warm salt water gargles, tylenol and ibuprofen as needed, if worsening symptoms or difficulty swallowing be re-evaluated

## 2024-12-27 NOTE — ED INITIAL ASSESSMENT (HPI)
Pt c/o sore throat and cough that started on marvin. Pt denies fevers. States she has some bodyaches and chills. Was unable to sleep last night because of sore throat. Denies n/v/d

## 2024-12-27 NOTE — ED PROVIDER NOTES
Patient Seen in: [facility]      History     Chief Complaint   Patient presents with    Sore Throat    Cough/URI     Stated Complaint: Cough    Subjective:   HPI      34-year-old female with known history of asthma, GERD, PAD, seizure disorder and ankylosing spondylitis comes in today complaining of severe sore throat.  Patient states she started having sore throat and cough on Christmas Day denies any fever or chills but has had some bodyaches.  Patient was unable to sleep last night secondary to pain in the throat.  No known sick contacts.  Recently had COVID-19 the beginning of this month. Patient works in an elementary school.     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)    Sees Dr. Lin- last seizure November 2015. seizures since around age 12 with events of LOC without shaking. In 2016 (age 25) she had another event of LOC with urinary incontinence without shaking. Was told EEG abnormal at this time. Started LEV and had no recurrent events of this type.              Past Surgical History:    Procedure Laterality Date    Colonoscopy  02/01/2012    nl ti, nl colon, nl random colon bx.    Colonoscopy N/A 05/21/2018    Procedure: COLONOSCOPY;  Surgeon: Henry Carpio MD;  Location:  ENDOSCOPY    Other surgical history  03/30/2012    Cystoscopy - Dr. Vidal     Other surgical history  01/01/2012    Negative - ab. 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[employer]    Vaping Use    Vaping status: Never Used   Substance and Sexual Activity    Alcohol use: Not Currently    Drug use: No    Sexual activity: Not Currently     Partners: Male   Other Topics Concern    Caffeine Concern Yes     Comment: 1/2 coffee a couple times a week    Exercise No     Social Drivers of Health     Financial Resource Strain: Low Risk  (3/26/2024)    Financial Resource Strain     Difficulty of Paying Living Expenses: Not hard at all     Med Affordability: No   Food Insecurity: No Food Insecurity (3/26/2024)    Food Insecurity     Food Insecurity: Never true   Transportation Needs: No Transportation Needs (3/26/2024)    Transportation Needs     Lack of Transportation: No   Stress: No Stress Concern Present (3/26/2024)    Stress     Feeling of Stress : No   Housing Stability: Low Risk  (3/26/2024)    Housing Stability     Housing Instability: No              Review of Systems    Positive for stated complaint: Cough  Other systems are as noted in HPI.  Constitutional and vital signs reviewed.       All other systems reviewed and negative except as noted above.    Physical Exam     ED Triage Vitals [12/27/24 0815]   /71   Pulse 90   Resp 16   Temp 98.3 °F (36.8 °C)   Temp src Oral   SpO2 99 %   O2 Device None (Room air)       Current Vitals:   Vital Signs  BP: 109/71  Pulse: 90  Resp: 16  Temp: 98.3 °F (36.8 °C)  Temp src: Oral    Oxygen Therapy  SpO2: 99 %  O2 Device: None (Room air)        Physical Exam  General Appearance: Alert, cooperative, no distress, appropriate for age   Head: Normocephalic, without obvious abnormality   Eyes: PERRL,  conjunctiva and cornea clear, both eyes   Ears: TM pearly gray color and semitransparent, external ear canals normal, both ears   Nose: Nares symmetrical, septum midline, mucosa normal, clear watery discharge; no sinus tenderness   Throat: Lips, tongue, and mucosa are moist, pink, and intact; teeth intact. No erythema, no exudates or tonsillar hypertrophy, uvula midline, no trismus or drooling no phonation changes, patient handling secretions well   Neck: Supple; no anterior or posterior cervical adenopathy, no neck rigidity or meningeal signs  Lungs: Clear to auscultation bilaterally, respirations unlabored. No wheezing, rales or rhonchi.   Heart: NSR, S1, S2 present. No murmurs, rubs or gallops.  Skin: no rash       ED Course     Labs Reviewed   RAPID STREP A - Normal           MDM          Medical Decision Making  34-year-old female who comes in with URI symptoms especially severe sore throat and cough that started Opal Day.  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  Denies fever but has had some bodyaches and chills    Amount and/or Complexity of Data Reviewed  Labs: ordered. Decision-making details documented in ED Course.     Details: Strep neg     Risk  OTC drugs.  Prescription drug management.  Risk Details: Clinical Impression: Viral upper respiratory infection      The  differential diagnosis before testing included viral syndrome, Acute Sinusitis, pneumonia, which is a medical condition that poses a threat to life/function.             Disposition and Plan     Clinical Impression:  1. Viral upper respiratory tract infection with cough    2. Cough, unspecified type         Disposition:  There is no disposition on file for this visit.  There is no disposition time on file for this visit.    Follow-up:  Loretta Pickard MD  [address] [address] [address]  [phone]    Schedule an appointment as soon as possible for a visit   If symptoms worsen          Medications Prescribed:  Current Discharge Medication List        START taking these medications    Details   benzonatate 100 MG Oral Cap Take 1 capsule (100 mg total) by mouth 3 (three) times daily as needed for cough.  Qty: 30 capsule, Refills: 0                 Supplementary Documentation:

## 2024-12-29 ENCOUNTER — APPOINTMENT (OUTPATIENT)
Dept: GENERAL RADIOLOGY | Age: 34
End: 2024-12-29
Attending: NURSE PRACTITIONER
Payer: COMMERCIAL

## 2024-12-29 ENCOUNTER — HOSPITAL ENCOUNTER (OUTPATIENT)
Age: 34
Discharge: HOME | End: 2024-12-29
Payer: COMMERCIAL

## 2024-12-29 VITALS
SYSTOLIC BLOOD PRESSURE: 124 MMHG | HEART RATE: 90 BPM | TEMPERATURE: 98 F | WEIGHT: 150 LBS | HEIGHT: 67 IN | BODY MASS INDEX: 23.54 KG/M2 | OXYGEN SATURATION: 97 % | DIASTOLIC BLOOD PRESSURE: 71 MMHG | RESPIRATION RATE: 20 BRPM

## 2024-12-29 DIAGNOSIS — L03.213 PRESEPTAL CELLULITIS OF LEFT EYE: Primary | ICD-10-CM

## 2024-12-29 DIAGNOSIS — J40 BRONCHITIS: ICD-10-CM

## 2024-12-29 LAB
POCT INFLUENZA A: NEGATIVE
POCT INFLUENZA B: NEGATIVE
S PYO AG THROAT QL IA.RAPID: NEGATIVE
SARS-COV-2 RNA RESP QL NAA+PROBE: NOT DETECTED

## 2024-12-29 PROCEDURE — 87651 STREP A DNA AMP PROBE: CPT | Performed by: NURSE PRACTITIONER

## 2024-12-29 PROCEDURE — 99214 OFFICE O/P EST MOD 30 MIN: CPT

## 2024-12-29 PROCEDURE — 87502 INFLUENZA DNA AMP PROBE: CPT | Performed by: NURSE PRACTITIONER

## 2024-12-29 PROCEDURE — 71046 X-RAY EXAM CHEST 2 VIEWS: CPT | Performed by: NURSE PRACTITIONER

## 2024-12-29 RX ORDER — IPRATROPIUM BROMIDE AND ALBUTEROL SULFATE 2.5; .5 MG/3ML; MG/3ML
3 SOLUTION RESPIRATORY (INHALATION) ONCE
Status: COMPLETED | OUTPATIENT
Start: 2024-12-29 | End: 2024-12-29

## 2024-12-29 RX ORDER — PREDNISONE 20 MG/1
40 TABLET ORAL DAILY
Qty: 10 TABLET | Refills: 0 | Status: SHIPPED | OUTPATIENT
Start: 2024-12-29 | End: 2025-01-02 | Stop reason: ALTCHOICE

## 2024-12-29 RX ORDER — BENZONATATE 200 MG/1
200 CAPSULE ORAL 3 TIMES DAILY PRN
Qty: 30 CAPSULE | Refills: 0 | Status: SHIPPED | OUTPATIENT
Start: 2024-12-29 | End: 2025-01-08

## 2024-12-29 RX ORDER — ALBUTEROL SULFATE 90 UG/1
2 INHALANT RESPIRATORY (INHALATION) EVERY 4 HOURS PRN
Qty: 1 EACH | Refills: 0 | Status: SHIPPED | OUTPATIENT
Start: 2024-12-29 | End: 2025-01-28

## 2024-12-29 RX ORDER — TOBRAMYCIN 3 MG/ML
1 SOLUTION/ DROPS OPHTHALMIC EVERY 4 HOURS
Qty: 1 EACH | Refills: 0 | Status: SHIPPED | OUTPATIENT
Start: 2024-12-29 | End: 2025-01-05

## 2024-12-29 NOTE — ED PROVIDER NOTES
Patient Seen in: [facility]      History   No chief complaint on file.    Stated Complaint: sore throat; cough; conjunctivitis    Subjective:   HPI  35 yo female presents with congestion, sore throat, cough and left eye swelling with drainage for 4 days. Pt's child with similar symptoms.    Objective:     No pertinent past medical history.            No pertinent past surgical history.              No pertinent social history.            Review of Systems   All other systems reviewed and are negative.      Positive for stated complaint: sore throat; cough; conjunctivitis  Other systems are as noted in HPI.  Constitutional and vital signs reviewed.      All other systems reviewed and negative except as noted above.    Physical Exam     ED Triage Vitals [12/29/24 0943]   /71   Pulse 90   Resp 20   Temp 98.3 °F (36.8 °C)   Temp src Oral   SpO2 97 %   O2 Device None (Room air)       Current Vitals:   Vital Signs  BP: 124/71  Pulse: 90  Resp: 20  Temp: 98.3 °F (36.8 °C)  Temp src: Oral    Oxygen Therapy  SpO2: 97 %  O2 Device: None (Room air)      Right Eye Chart Acuity: 20/25, Uncorrected  Left Eye Chart Acuity: 20/20, Uncorrected  Physical Exam  Vitals and nursing note reviewed.   Constitutional:       General: She is not in acute distress.     Appearance: She is well-developed. She is not ill-appearing or toxic-appearing.   HENT:      Right Ear: Tympanic membrane, ear canal and external ear normal.      Left Ear: Tympanic membrane, ear canal and external ear normal.      Nose: Congestion present. No rhinorrhea.   Eyes:      Comments: Left eye redness with drainage and upper and lower eyelid swelling with redness. No proptosis or pain with eom   Cardiovascular:      Rate and Rhythm: Normal rate and regular rhythm.      Heart sounds: Normal heart sounds.   Pulmonary:      Effort: Pulmonary effort is normal. No respiratory distress.      Breath sounds: Wheezing present.   Skin:     General: Skin  is warm and dry.   Neurological:      Mental Status: She is alert and oriented to person, place, and time.             ED Course     Labs Reviewed   POCT FLU TEST - Normal    Narrative:     This assay is a rapid molecular in vitro test utilizing nucleic acid amplification of influenza A and B viral RNA.   RAPID SARS-COV-2 BY PCR - Normal   RAPID STREP A - Normal            XR CHEST PA + LAT CHEST (CPT=71046)    Result Date: 12/29/2024  PROCEDURE:  XR CHEST PA + LAT CHEST (CPT=71046)  INDICATIONS:  sore throat; cough; conjunctivitis  COMPARISON:  [facility], XR, XR CHEST PA + LAT CHEST (CPT=71046), 12/06/2024, 11:38 AM.  TECHNIQUE:  PA and lateral chest radiographs were obtained.  PATIENT STATED HISTORY: (As transcribed by Technologist)  Patient states that she has a sore throat, productive cough, and left eye swelling for four days.               CONCLUSION:  Normal cardiac and mediastinal contours.  No pulmonary edema or focal airspace consolidation.  The pleural spaces are clear.  Regional osseous structures are normal.    LOCATION:  [facility]   Dictated by (CST): Taras Hodges MD on 12/29/2024 at 11:32 AM     Finalized by (CST): Taras Hodges MD on 12/29/2024 at 11:32 AM       XR CHEST PA + LAT CHEST (CPT=71046)    Result Date: 12/6/2024  PROCEDURE:  XR CHEST PA + LAT CHEST (CPT=71046)  INDICATIONS:  cough / chest tightness  COMPARISON:  [facility], XR, XR CHEST PA + LAT CHEST (CPT=71046), 10/01/2024, 3:05 PM.  TECHNIQUE:  PA and lateral chest radiographs were obtained.  PATIENT STATED HISTORY: (As transcribed by Technologist)  cough and chest tightness since yesterday    FINDINGS:  Heart size is within normal limits.  Pleural spaces appear clear.  Mediastinal and hilar contours are normal.  No focal consolidation.  If clinical symptoms persist then recommend follow-up imaging.            CONCLUSION:  See above.   LOCATION:  [facility]   Dictated by (CST): Khalif Allison MD on 12/06/2024 at 11:45 AM     Finalized by (CST):  Khalif Allison MD on 12/06/2024 at 11:45 AM           MDM       Medical Decision Making  35 yo female presents with congestion, sore throat, cough and left eye swelling with drainage for 4 days. Pt's child with similar symptoms.    Pertinent Labs & Imaging studies reviewed. (See chart for details).  Patient coming in with viral sx, eye swelling.   Differential diagnosis includes but not limited to covid, flu, strep, pneumonia, preseptal cellulitis, orbital cellulitis  Labs reviewed covid, flu, strep -. Radiology Heart size is within normal limits.  Pleural spaces appear clear.  Mediastinal and hilar contours are normal.  No focal consolidation.  Will treat for  bronchitis, preseptal cellulitis.  Will discharge on augmentin, albuterol, tessalon, tobramycin. Patient/Parent is comfortable with this plan.    Overall Pt looks good. Non-toxic, well-hydrated and in no respiratory distress. Vital signs are reassuring. Exam is reassuring. I do not believe pt requires and additional diagnostic studies or intervention. I believe pt can be discharged home to continue evaluation as an outpatient. Follow-up provider given. Discharge instructions given and reviewed. Return for any problems. All understand and agree with the plan.        Problems Addressed:  Bronchitis: acute illness or injury  Preseptal cellulitis of left eye: acute illness or injury    Amount and/or Complexity of Data Reviewed  Radiology: ordered and independent interpretation performed.     Details: Chest x-ray ordered and independently interpreted by myself as no consolidation        Disposition and Plan     Clinical Impression:  1. Preseptal cellulitis of left eye    2. Bronchitis         Disposition:  Discharge  12/29/2024 11:36 am    Follow-up:  No follow-up provider specified.        Medications Prescribed:  Discharge Medication List as of 12/29/2024 11:38 AM        START taking these medications    Details   amoxicillin clavulanate 875-125 MG Oral Tab Take 1  tablet by mouth 2 (two) times daily for 10 days., Normal, Disp-20 tablet, R-0      !! albuterol 108 (90 Base) MCG/ACT Inhalation Aero Soln Inhale 2 puffs into the lungs every 4 (four) hours as needed for Wheezing., Normal, Disp-1 each, R-0      !! benzonatate 200 MG Oral Cap Take 1 capsule (200 mg total) by mouth 3 (three) times daily as needed., Normal, Disp-30 capsule, R-0      tobramycin 0.3 % Ophthalmic Solution Place 1 drop into both eyes every 4 (four) hours for 7 days., Normal, Disp-1 each, R-0       !! - Potential duplicate medications found. Please discuss with provider.              Supplementary Documentation:

## 2025-01-02 ENCOUNTER — OFFICE VISIT (OUTPATIENT)
Dept: FAMILY MEDICINE CLINIC | Facility: CLINIC | Age: 35
End: 2025-01-02
Payer: COMMERCIAL

## 2025-01-02 VITALS
OXYGEN SATURATION: 99 % | HEIGHT: 67 IN | TEMPERATURE: 97 F | SYSTOLIC BLOOD PRESSURE: 110 MMHG | DIASTOLIC BLOOD PRESSURE: 70 MMHG | RESPIRATION RATE: 16 BRPM | HEART RATE: 94 BPM | WEIGHT: 156.13 LBS | BODY MASS INDEX: 24.51 KG/M2

## 2025-01-02 DIAGNOSIS — J18.9 COMMUNITY ACQUIRED PNEUMONIA, UNSPECIFIED LATERALITY: Primary | ICD-10-CM

## 2025-01-02 DIAGNOSIS — J45.30 MILD PERSISTENT ASTHMA WITHOUT COMPLICATION (HCC): ICD-10-CM

## 2025-01-02 PROCEDURE — 3008F BODY MASS INDEX DOCD: CPT | Performed by: STUDENT IN AN ORGANIZED HEALTH CARE EDUCATION/TRAINING PROGRAM

## 2025-01-02 PROCEDURE — 99213 OFFICE O/P EST LOW 20 MIN: CPT | Performed by: STUDENT IN AN ORGANIZED HEALTH CARE EDUCATION/TRAINING PROGRAM

## 2025-01-02 PROCEDURE — 3078F DIAST BP <80 MM HG: CPT | Performed by: STUDENT IN AN ORGANIZED HEALTH CARE EDUCATION/TRAINING PROGRAM

## 2025-01-02 PROCEDURE — 3074F SYST BP LT 130 MM HG: CPT | Performed by: STUDENT IN AN ORGANIZED HEALTH CARE EDUCATION/TRAINING PROGRAM

## 2025-01-02 RX ORDER — CODEINE PHOSPHATE AND GUAIFENESIN 10; 100 MG/5ML; MG/5ML
5 SOLUTION ORAL EVERY 6 HOURS PRN
Qty: 118 ML | Refills: 0 | Status: SHIPPED | OUTPATIENT
Start: 2025-01-02

## 2025-01-02 RX ORDER — METHYLPREDNISOLONE 4 MG/1
TABLET ORAL
Qty: 21 EACH | Refills: 0 | Status: SHIPPED | OUTPATIENT
Start: 2025-01-02

## 2025-01-02 RX ORDER — FLUTICASONE PROPIONATE AND SALMETEROL 100; 50 UG/1; UG/1
1 POWDER RESPIRATORY (INHALATION) 2 TIMES DAILY
Qty: 180 EACH | Refills: 1 | Status: SHIPPED | OUTPATIENT
Start: 2025-01-02

## 2025-01-02 RX ORDER — DOXYCYCLINE HYCLATE 100 MG
100 TABLET ORAL 2 TIMES DAILY
Qty: 14 TABLET | Refills: 0 | Status: SHIPPED | OUTPATIENT
Start: 2025-01-02 | End: 2025-01-09

## 2025-01-02 NOTE — PROGRESS NOTES
Subjective:      Chief Complaint   Patient presents with    Cough     Have been sick since marvin huynh - just finished steroids today - went to  care on 12/29/24 and meds given - states cough is getting worse - denied any fever     HISTORY OF PRESENT ILLNESS  HPI  HPI obtained per patient report.  Shilpa Lopez is a pleasant 34 year old female presenting with a cough.     She states that a productive cough, sore throat, and L eye drainage began on 12/24/24. She was evaluated at urgent care on 12/27 and 12/29. At urgent care, rapid strep, influenza, and Covid-19 tests were negative and CXR was WNL. She was diagnosed with preseptal cellulitis and bronchitis and prescribed prednisone, augmentin, an albuterol inhaler, tessalon perles, and tobramycin eye drops.   Her cough has been worsening despite taking at least 4 doses of her antibiotic and finishing her steroids this morning. Her other symptoms have resolved, however.     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    Extrinsic asthma, unspecified    sports induced    GERD (gastroesophageal reflux disease)    Hematuria    weird presentation of ibs    History of gross hematuria    History of recurrent UTIs    Interstitial cystitis    Irritable bowel    Knee pain    MRSA infection    Eye    Other and unspecified ovarian cysts    Pain in both knees, unspecified chronicity    Pelvic floor dysfunction    Pelvic inflammatory disease    PONV (postoperative nausea and vomiting)    nausea    Right ankle pain, unspecified chronicity    Right rotator cuff tendonitis    Seizure disorder ([facility])    Sees Dr. Lin- last seizure November  2015. seizures since around age 12 with events of LOC without shaking. In 2016 (age 25) she had another event of LOC with urinary incontinence without shaking. Was told EEG abnormal at this time. Started LEV and had no recurrent events of this type.     Past Surgical History:   Procedure Laterality Date    Colonoscopy  02/01/2012    nl ti, nl colon, nl random colon bx.    Colonoscopy N/A 05/21/2018    Procedure: COLONOSCOPY;  Surgeon: Henry Carpio MD;  Location:  ENDOSCOPY    Other surgical history  03/30/2012    Cystoscopy - Dr. Vidal     Other surgical history  01/01/2012    Negative - ab. pain - resolved with amitriptyline    Other surgical history  05/16/2017    Cystoscopy - Dr Kruger    Other surgical history  06/07/2017    Cysto w low pressure hydro-distention, fulguration of Hunner's ulcer & inj kenalog    Tonsillectomy         CURRENT MEDICATIONS  Medications Taking[1]    HEALTH MAINTENANCE  Immunization History   Administered Date(s) Administered    Covid-19 Vaccine Pfizer 30 mcg/0.3 ml 02/04/2021, 02/24/2021    DTAP 09/11/1992, 09/11/1992, 11/02/1992, 11/02/1992, 01/05/1993, 01/05/1993, 12/30/1993, 12/30/1993, 07/25/1997, 07/25/1997    FLU VAC QIV SPLIT 3 YRS AND OLDER (24536) 10/14/2014, 01/04/2018, 10/03/2018    FLUZONE 6 months and older PFS 0.5 ml (47732) 10/14/2014, 11/08/2019    Flublok Quad Influenza Vaccine (15796) 10/02/2020, 10/02/2021, 11/11/2022    Flucelvax Influenza vaccine, trivalent (ccIIV3), 0.5mL IM 10/09/2023    HEP B 07/10/1992, 08/10/1992, 03/12/1993    HEP B Vaccine 07/10/1992, 08/10/1992, 03/12/1993    HEP B, Ped/Adol 07/10/1992, 08/10/1992, 03/12/1993    HIB 09/11/1992, 11/02/1992, 01/05/1993, 10/05/1993    Hib, Unspecified Formulation 09/11/1992, 11/02/1992, 01/05/1993, 10/05/1993    Influenza 10/28/1999, 01/02/2018    Influenza Vaccine, trivalent (IIV3), PF 0.5mL (37808) 11/07/2024    MMR 10/05/1993, 07/25/1997    OPV 09/11/1992, 11/02/1992, 01/05/1993, 12/30/1993,  07/25/1997    Pfizer Covid-19 Vaccine 30mcg/0.3ml 12yrs+ 10/09/2023, 11/07/2024    Pneumovax 23 10/03/2018    TD 03/02/2017    TDAP 01/04/2024    Tb Intradermal Test 08/11/2016       ALLERGIES AND DRUG REACTIONS  Allergies[2]    Family History   Problem Relation Age of Onset    Hypertension Mother     Stroke Paternal Grandmother     Cancer Neg     Diabetes Neg     Lipids Neg     Heart Disorder Neg      Social History     Socioeconomic History    Marital status:     Number of children: 0   Occupational History    Occupation: [employer] -    Tobacco Use    Smoking status: Never    Smokeless tobacco: Never    Tobacco comments:     Job:   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[employer], commutes, 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[email or web address]    Vaping Use    Vaping status: Never Used   Substance and Sexual Activity    Alcohol use: Not Currently    Drug use: No    Sexual activity: Not Currently     Partners: Male   Other Topics Concern    Caffeine Concern Yes     Comment: 1/2 coffee a couple times a week    Exercise No     Social Drivers of Health     Financial Resource Strain: Low Risk  (3/26/2024)    Financial Resource Strain     Difficulty of Paying Living Expenses: Not hard at all     Med Affordability: No   Food Insecurity: No Food Insecurity (3/26/2024)    Food Insecurity     Food Insecurity: Never true   Transportation Needs: No Transportation Needs (3/26/2024)    Transportation Needs     Lack of Transportation: No   Stress: No Stress Concern Present (3/26/2024)    Stress     Feeling of Stress : No   Housing Stability: Low Risk  (3/26/2024)    Housing Stability     Housing Instability: No       Review of Systems   Respiratory:  Positive for cough.    All other systems reviewed and are negative.         Objective:      /70   Pulse 94   Temp 97.3 °F (36.3 °C) (Temporal)   Resp 16   Ht 5' 7\" (1.702 m)   Wt 156 lb 2 oz (70.8 kg)   LMP 12/18/2024 (Exact Date)   SpO2 99%   BMI 24.45 kg/m²   Body mass index is  24.45 kg/m².    Physical Exam  Vitals reviewed.   Constitutional:       General: She is not in acute distress.     Appearance: She is not ill-appearing, toxic-appearing or diaphoretic.   HENT:      Head: Normocephalic and atraumatic.      Nose: Nose normal.      Mouth/Throat:      Mouth: Mucous membranes are moist.      Pharynx: Oropharynx is clear. No oropharyngeal exudate or posterior oropharyngeal erythema.   Eyes:      General: No scleral icterus.        Right eye: No discharge.         Left eye: No discharge.      Extraocular Movements: Extraocular movements intact.      Conjunctiva/sclera: Conjunctivae normal.   Cardiovascular:      Rate and Rhythm: Normal rate and regular rhythm.      Heart sounds: Normal heart sounds.   Pulmonary:      Effort: Pulmonary effort is normal.      Breath sounds: Normal breath sounds.      Comments: Mildly diminished BS to B/L lung bases  Abdominal:      General: Abdomen is flat. There is no distension.   Musculoskeletal:      Cervical back: Neck supple. No tenderness.      Right lower leg: No edema.      Left lower leg: No edema.   Lymphadenopathy:      Cervical: No cervical adenopathy.   Skin:     General: Skin is warm and dry.      Findings: No rash.   Neurological:      Mental Status: She is alert and oriented to person, place, and time.   Psychiatric:         Mood and Affect: Mood normal.            Assessment and Plan:      1. Community acquired pneumonia, unspecified laterality (Primary)  -     methylPREDNISolone; As directed.  Dispense: 21 each; Refill: 0  -     Doxycycline Hyclate; Take 1 tablet (100 mg total) by mouth 2 (two) times daily for 7 days.  Dispense: 14 tablet; Refill: 0  -     guaiFENesin-Codeine; Take 5 mL by mouth every 6 (six) hours as needed.  Dispense: 118 mL; Refill: 0  2. Mild persistent asthma without complication (HCC)  -     methylPREDNISolone; As directed.  Dispense: 21 each; Refill: 0  -     Fluticasone-Salmeterol; Inhale 1 puff into the lungs 2  (two) times daily.  Dispense: 180 each; Refill: 1    Return if symptoms worsen or fail to improve.    Worsening cough  - likely 2/2 asthma exacerbation due to PNA  - urgent care records reviewed  - recommended discontinuation of augmentin and starting doxycycline instead  - will extend course of steroids  - recommended continuation of albuterol inhaler PRN, tessalon perles PRN, and/or codeine cough syrup PRN  - recommended resumption of fluticasone-salmeterol inhaler after finishing medrol dose pack    Patient verbalized understanding of assessment and recommendations. All questions and concerns were addressed.    Electronically signed by Loretta Pickard MD         [1]   Outpatient Medications Marked as Taking for the 1/2/25 encounter (Office Visit) with Loretta Pickard MD   Medication Sig Dispense Refill    methylPREDNISolone (MEDROL) 4 MG Oral Tablet Therapy Pack As directed. 21 each 0    Doxycycline Hyclate 100 MG Oral Tab Take 1 tablet (100 mg total) by mouth 2 (two) times daily for 7 days. 14 tablet 0    guaiFENesin-codeine 100-10 MG/5ML Oral Solution Take 5 mL by mouth every 6 (six) hours as needed. 118 mL 0    fluticasone-salmeterol 100-50 MCG/ACT Inhalation Aerosol Powder, Breath Activated Inhale 1 puff into the lungs 2 (two) times daily. 180 each 1    amoxicillin clavulanate 875-125 MG Oral Tab Take 1 tablet by mouth 2 (two) times daily for 10 days. 20 tablet 0    albuterol 108 (90 Base) MCG/ACT Inhalation Aero Soln Inhale 2 puffs into the lungs every 4 (four) hours as needed for Wheezing. 1 each 0    benzonatate 200 MG Oral Cap Take 1 capsule (200 mg total) by mouth 3 (three) times daily as needed. 30 capsule 0    tobramycin 0.3 % Ophthalmic Solution Place 1 drop into both eyes every 4 (four) hours for 7 days. 1 each 0    levETIRAcetam  MG Oral Tablet 24 Hr Take 2 tablets (1,500 mg total) by mouth at bedtime.      Desogestrel-Ethinyl Estradiol (APRI) 0.15-30 MG-MCG Oral Tab Take 1 tablet by mouth daily. 84  tablet 3    Cholecalciferol (VITAMIN D) 1000 units Oral Tab Take 1,000 tablets by mouth daily.     [2]   Allergies  Allergen Reactions    Amitriptyline FATIGUE, HALLUCINATION, OTHER (SEE COMMENTS), NAUSEA AND VOMITING and CONFUSION     Profound sleepiness.    Causes lethargy   Other reaction(s): FATIGUE   Strange dreams and sleeping too much   Profound sleepiness.    Severe fatique    Etanercept FATIGUE, OTHER (SEE COMMENTS) and NAUSEA AND VOMITING     Paralysis of leg   Other reaction(s): Weakness   Lower extremity weakness    Lower extremity weakness    Leg paralysis    Paralysis of leg   Other reaction(s): Weakness   Lower extremity weakness   Lower extremity weakness

## 2025-01-21 ENCOUNTER — PATIENT MESSAGE (OUTPATIENT)
Dept: NEUROLOGY | Facility: CLINIC | Age: 35
End: 2025-01-21

## 2025-01-21 DIAGNOSIS — R56.9 CONVULSIONS, UNSPECIFIED CONVULSION TYPE (HCC): Primary | ICD-10-CM

## 2025-02-28 RX ORDER — LEVETIRACETAM 750 MG/1
1500 TABLET, FILM COATED, EXTENDED RELEASE ORAL NIGHTLY
Qty: 60 TABLET | Refills: 0 | Status: CANCELLED | OUTPATIENT
Start: 2025-02-28

## 2025-03-03 ENCOUNTER — TELEPHONE (OUTPATIENT)
Dept: FAMILY MEDICINE CLINIC | Facility: CLINIC | Age: 35
End: 2025-03-03

## 2025-03-03 ENCOUNTER — TELEPHONE (OUTPATIENT)
Age: 35
End: 2025-03-03

## 2025-03-03 ENCOUNTER — TELEPHONE (OUTPATIENT)
Dept: OBGYN CLINIC | Facility: CLINIC | Age: 35
End: 2025-03-03

## 2025-03-03 NOTE — TELEPHONE ENCOUNTER
Patient having a hard time today with anxiety, upset on the phone, would like to see Dr. Pickard today, even by VV if possible.

## 2025-03-03 NOTE — TELEPHONE ENCOUNTER
Patient was seen in office last June regarding post partum depression symptoms. She says she did not go forward with medication, however today she is wanting to see a Dr right away to discuss this again and maybe go forward with medication. She is very tearful, thank you

## 2025-03-03 NOTE — TELEPHONE ENCOUNTER
Future Appointments   Date Time Provider Department Center   3/4/2025  3:00 PM Loretta Pickard MD [facility] [address] [facility] [address]   3/17/2025 11:00 AM Ruthie Sadler LCSW [facility] [place]

## 2025-03-03 NOTE — TELEPHONE ENCOUNTER
Per chart review:  Patient delivered March 2024.  Discussed postpartum depression with Dr. David on 6/24/2024- [facility] order was placed and patient was instructed to follow-up with her PCP.    Spoke to patient.  Patient denies suicidal or homicidal ideation.  Patient denies scheduling an appointment with [facility]er last June but she has contacted them this morning to make an appointment- she is waiting for a return call.  Discussed that our providers typically do not manage anti-depressant medications outside of pregnancy or the immediate postpartum period.  Encouraged patient to pursue evaluation with [facility].  Advised patient to contact her PCP to discuss starting a prescription while she waits to be seen.  Instructed patient to call 911 with any thoughts of self harm or harm to others.  Informed patient that she is due for her annual exam in our office and to call for appointment once she has everything settled.  Patient verbalized understanding and agrees with plan.

## 2025-03-08 ENCOUNTER — LAB ENCOUNTER (OUTPATIENT)
Dept: LAB | Age: 35
End: 2025-03-08
Attending: Other
Payer: COMMERCIAL

## 2025-03-08 DIAGNOSIS — E61.1 IRON DEFICIENCY: ICD-10-CM

## 2025-03-08 DIAGNOSIS — F32.A ANXIETY AND DEPRESSION: ICD-10-CM

## 2025-03-08 DIAGNOSIS — R53.83 FATIGUE, UNSPECIFIED TYPE: ICD-10-CM

## 2025-03-08 DIAGNOSIS — Z13.220 SCREENING CHOLESTEROL LEVEL: ICD-10-CM

## 2025-03-08 DIAGNOSIS — R56.9 CONVULSIONS, UNSPECIFIED CONVULSION TYPE (HCC): ICD-10-CM

## 2025-03-08 DIAGNOSIS — E55.9 VITAMIN D DEFICIENCY: ICD-10-CM

## 2025-03-08 DIAGNOSIS — F41.9 ANXIETY AND DEPRESSION: ICD-10-CM

## 2025-03-08 DIAGNOSIS — F51.05 INSOMNIA DUE TO OTHER MENTAL DISORDER: ICD-10-CM

## 2025-03-08 DIAGNOSIS — F99 INSOMNIA DUE TO OTHER MENTAL DISORDER: ICD-10-CM

## 2025-03-08 LAB
ALBUMIN SERPL-MCNC: 4.3 G/DL (ref 3.2–4.8)
ALBUMIN/GLOB SERPL: 1.7 {RATIO} (ref 1–2)
ALP LIVER SERPL-CCNC: 53 U/L
ALT SERPL-CCNC: 15 U/L
ANION GAP SERPL CALC-SCNC: 7 MMOL/L (ref 0–18)
AST SERPL-CCNC: 20 U/L (ref ?–34)
BASOPHILS # BLD AUTO: 0.04 X10(3) UL (ref 0–0.2)
BASOPHILS NFR BLD AUTO: 0.6 %
BILIRUB SERPL-MCNC: 0.5 MG/DL (ref 0.3–1.2)
BUN BLD-MCNC: 14 MG/DL (ref 9–23)
CALCIUM BLD-MCNC: 9.1 MG/DL (ref 8.7–10.6)
CHLORIDE SERPL-SCNC: 108 MMOL/L (ref 98–112)
CHOLEST SERPL-MCNC: 226 MG/DL (ref ?–200)
CO2 SERPL-SCNC: 27 MMOL/L (ref 21–32)
CREAT BLD-MCNC: 0.91 MG/DL
DEPRECATED HBV CORE AB SER IA-ACNC: 21 NG/ML
EGFRCR SERPLBLD CKD-EPI 2021: 86 ML/MIN/1.73M2 (ref 60–?)
EOSINOPHIL # BLD AUTO: 0.07 X10(3) UL (ref 0–0.7)
EOSINOPHIL NFR BLD AUTO: 1.1 %
ERYTHROCYTE [DISTWIDTH] IN BLOOD BY AUTOMATED COUNT: 11.9 %
FASTING PATIENT LIPID ANSWER: YES
FASTING STATUS PATIENT QL REPORTED: YES
GLOBULIN PLAS-MCNC: 2.6 G/DL (ref 2–3.5)
GLUCOSE BLD-MCNC: 92 MG/DL (ref 70–99)
HCT VFR BLD AUTO: 38.3 %
HDLC SERPL-MCNC: 100 MG/DL (ref 40–59)
HGB BLD-MCNC: 12.8 G/DL
IMM GRANULOCYTES # BLD AUTO: 0.01 X10(3) UL (ref 0–1)
IMM GRANULOCYTES NFR BLD: 0.2 %
IRON SATN MFR SERPL: 13 %
IRON SERPL-MCNC: 50 UG/DL
LDLC SERPL CALC-MCNC: 106 MG/DL (ref ?–100)
LYMPHOCYTES # BLD AUTO: 2.11 X10(3) UL (ref 1–4)
LYMPHOCYTES NFR BLD AUTO: 34.1 %
MCH RBC QN AUTO: 29 PG (ref 26–34)
MCHC RBC AUTO-ENTMCNC: 33.4 G/DL (ref 31–37)
MCV RBC AUTO: 86.8 FL
MONOCYTES # BLD AUTO: 0.36 X10(3) UL (ref 0.1–1)
MONOCYTES NFR BLD AUTO: 5.8 %
NEUTROPHILS # BLD AUTO: 3.59 X10 (3) UL (ref 1.5–7.7)
NEUTROPHILS # BLD AUTO: 3.59 X10(3) UL (ref 1.5–7.7)
NEUTROPHILS NFR BLD AUTO: 58.2 %
NONHDLC SERPL-MCNC: 126 MG/DL (ref ?–130)
OSMOLALITY SERPL CALC.SUM OF ELEC: 294 MOSM/KG (ref 275–295)
PLATELET # BLD AUTO: 267 10(3)UL (ref 150–450)
POTASSIUM SERPL-SCNC: 4 MMOL/L (ref 3.5–5.1)
PROT SERPL-MCNC: 6.9 G/DL (ref 5.7–8.2)
RBC # BLD AUTO: 4.41 X10(6)UL
SODIUM SERPL-SCNC: 142 MMOL/L (ref 136–145)
TOTAL IRON BINDING CAPACITY: 378 UG/DL (ref 250–425)
TRANSFERRIN SERPL-MCNC: 327 MG/DL (ref 250–380)
TRIGL SERPL-MCNC: 121 MG/DL (ref 30–149)
TSI SER-ACNC: 0.95 UIU/ML (ref 0.55–4.78)
VIT B12 SERPL-MCNC: 342 PG/ML (ref 211–911)
VIT D+METAB SERPL-MCNC: 27.5 NG/ML (ref 30–100)
VLDLC SERPL CALC-MCNC: 20 MG/DL (ref 0–30)
WBC # BLD AUTO: 6.2 X10(3) UL (ref 4–11)

## 2025-03-08 PROCEDURE — 85025 COMPLETE CBC W/AUTO DIFF WBC: CPT

## 2025-03-08 PROCEDURE — 80053 COMPREHEN METABOLIC PANEL: CPT

## 2025-03-08 PROCEDURE — 80061 LIPID PANEL: CPT

## 2025-03-08 PROCEDURE — 83550 IRON BINDING TEST: CPT

## 2025-03-08 PROCEDURE — 84443 ASSAY THYROID STIM HORMONE: CPT

## 2025-03-08 PROCEDURE — 82306 VITAMIN D 25 HYDROXY: CPT

## 2025-03-08 PROCEDURE — 36415 COLL VENOUS BLD VENIPUNCTURE: CPT

## 2025-03-08 PROCEDURE — 82728 ASSAY OF FERRITIN: CPT

## 2025-03-08 PROCEDURE — 80177 DRUG SCRN QUAN LEVETIRACETAM: CPT

## 2025-03-08 PROCEDURE — 83540 ASSAY OF IRON: CPT

## 2025-03-08 PROCEDURE — 82607 VITAMIN B-12: CPT

## 2025-03-12 LAB — LEVETIRACETAM LVL: 26.7 UG/ML

## 2025-03-13 NOTE — PROGRESS NOTES
Faisal Massey,     Your lab results showed that you are mildly deficient in iron and vitamin D and that your cholesterol levels are mildly elevated, but otherwise looked great. Iron deficiency can contribute to symptoms of fatigue. Please start an iron supplement (325 mg every other day) and a vitamin D supplement (2000 units daily). This dosing is available over the counter. To help improve your cholesterol levels, I would also recommend increasing your fiber and omega-3 intake and exercising regularly.     Please let me know if you have any questions.     Dr. Pickard

## 2025-04-11 RX ORDER — DESOGESTREL AND ETHINYL ESTRADIOL 0.15-0.03
1 KIT ORAL DAILY
Qty: 84 TABLET | Refills: 3 | OUTPATIENT
Start: 2025-04-11

## 2025-04-11 NOTE — TELEPHONE ENCOUNTER
Last OV: 6 week PP visit 5/10/24 Dr. David,  Last Refill Date: 5/10/24 # 84 3 refills  Follow Up:  6 months for WWE , 11/2024  Next Appt. None noted on chart     Needs appointment.  ROBERTO CARLOS

## 2025-04-15 RX ORDER — DESOGESTREL AND ETHINYL ESTRADIOL 0.15-0.03
1 KIT ORAL DAILY
Qty: 84 TABLET | Refills: 3 | Status: SHIPPED | OUTPATIENT
Start: 2025-04-15 | End: 2026-04-15

## 2025-04-15 NOTE — TELEPHONE ENCOUNTER
Last OV: 6 week PP visit 5/10/24 Dr. David,  Last Refill Date: 5/10/24 # 84 3 refills  Follow Up:  6 months for WWE , 11/2024  Next Appt. 6/3/2025     Birth control Rx pended for provider review  -Patient overdue for wellness exam  -Approval required for refill, per protocol

## 2025-04-15 NOTE — TELEPHONE ENCOUNTER
Patient calling due to denial of BC refill, made appt.   Future Appointments   Date Time Provider Department Center   6/3/2025  3:00 PM Lizzeth Omer,  [facility] [place]     For annual, would like to know if she can get refill prior to appt. Please let her know via frintithart when complete.

## 2025-06-03 ENCOUNTER — OFFICE VISIT (OUTPATIENT)
Dept: OBGYN CLINIC | Facility: CLINIC | Age: 35
End: 2025-06-03
Payer: COMMERCIAL

## 2025-06-03 VITALS
HEART RATE: 72 BPM | SYSTOLIC BLOOD PRESSURE: 110 MMHG | DIASTOLIC BLOOD PRESSURE: 68 MMHG | BODY MASS INDEX: 24 KG/M2 | WEIGHT: 152 LBS

## 2025-06-03 DIAGNOSIS — Z01.419 WELL WOMAN EXAM WITH ROUTINE GYNECOLOGICAL EXAM: Primary | ICD-10-CM

## 2025-06-03 PROCEDURE — 3078F DIAST BP <80 MM HG: CPT | Performed by: STUDENT IN AN ORGANIZED HEALTH CARE EDUCATION/TRAINING PROGRAM

## 2025-06-03 PROCEDURE — 99459 PELVIC EXAMINATION: CPT | Performed by: STUDENT IN AN ORGANIZED HEALTH CARE EDUCATION/TRAINING PROGRAM

## 2025-06-03 PROCEDURE — 3074F SYST BP LT 130 MM HG: CPT | Performed by: STUDENT IN AN ORGANIZED HEALTH CARE EDUCATION/TRAINING PROGRAM

## 2025-06-03 PROCEDURE — 99395 PREV VISIT EST AGE 18-39: CPT | Performed by: STUDENT IN AN ORGANIZED HEALTH CARE EDUCATION/TRAINING PROGRAM

## 2025-06-03 RX ORDER — DESOGESTREL AND ETHINYL ESTRADIOL 0.15-0.03
1 KIT ORAL DAILY
Qty: 84 TABLET | Refills: 3 | Status: SHIPPED | OUTPATIENT
Start: 2025-06-03 | End: 2026-06-03

## 2025-06-03 NOTE — PROGRESS NOTES
[facility]  Obstetrics and Gynecology  History & Physical    CC: Patient presents for a well woman exam     Subjective:     HPI: Shilpa Lopez is a 34 year old  female here for a well women exam. Patient reports no changes in health since last visit. Happy with birth control pills, had bad experience with Mirena IUD being embedded in uterine wall. Sexually active and has no complaints, declines STI testing today. Follows with neurology for seizures on Keppra. Periods are light, 4 days, using 5-6 tampons at heaviest flow.     OB History:  OB History    Para Term  AB Living   1 1 1   1   SAB IAB Ectopic Multiple Live Births      0 1      # Outcome Date GA Lbr Jose L/2nd Weight Sex Type Anes PTL Lv   1 Term 24 39w6d 24:04 / 03:12 8 lb 6 oz (3.8 kg) M NORMAL SPONT EPI N MATEUS      Complications: Fetal tachycardia      Obstetric Comments   3/27/2024 male \"Finn\" - seizure disorder on Keppra. Placenta was significant only for a \"false knot\" of the umbilical cord. Ju       Gyne History:  Hx Prior Abnormal Pap: No  Pap Date: 03/15/23  Pap Result Notes: wnl  Patient's last menstrual period was 2025 (exact date).    denies history of STDs (non-HPV).   Sexual history: Active? Yes,   Birth control? OCPs    Meds:  Medications Ordered Prior to Encounter[1]    All:  Allergies[2]    PMH:  Past Medical History[3]    Immunization History:   Immunization History   Administered Date(s) Administered    Covid-19 Vaccine Pfizer 30 mcg/0.3 ml 2021, 2021    DTAP 1992, 1992, 1992, 1992, 1993, 1993, 1993, 1993, 1997, 1997    FLU VAC QIV SPLIT 3 YRS AND OLDER (67232) 10/14/2014, 2018, 10/03/2018    FLUZONE 6 months and older PFS 0.5 ml (63235) 10/14/2014, 2019    Flublok Quad Influenza Vaccine (80310) 10/02/2020, 10/02/2021, 2022    Flucelvax Influenza vaccine, trivalent (ccIIV3), 0.5mL IM 10/09/2023     HEP B 07/10/1992, 08/10/1992, 03/12/1993    HEP B Vaccine 07/10/1992, 08/10/1992, 03/12/1993    HEP B, Ped/Adol 07/10/1992, 08/10/1992, 03/12/1993    HIB 09/11/1992, 11/02/1992, 01/05/1993, 10/05/1993    Hib, Unspecified Formulation 09/11/1992, 11/02/1992, 01/05/1993, 10/05/1993    Influenza 10/28/1999, 01/02/2018    Influenza Vaccine, trivalent (IIV3), PF 0.5mL (72516) 11/07/2024    MMR 10/05/1993, 07/25/1997    OPV 09/11/1992, 11/02/1992, 01/05/1993, 12/30/1993, 07/25/1997    Pfizer Covid-19 Vaccine 30mcg/0.3ml 12yrs+ 10/09/2023, 11/07/2024    Pneumovax 23 10/03/2018    TD 03/02/2017    TDAP 01/04/2024    Tb Intradermal Test 08/11/2016       PSH:  Past Surgical History[4]    Social History:  Social History     Socioeconomic History    Marital status:      Spouse name: Not on file    Number of children: 0    Years of education: Not on file    Highest education level: Not on file   Occupational History    Occupation: [employer] -    Tobacco Use    Smoking status: Never    Smokeless tobacco: Never    Tobacco comments:     Job:   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[employer], commutes, 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[employer]    Vaping Use    Vaping status: Never Used   Substance and Sexual Activity    Alcohol use: Yes    Drug use: No    Sexual activity: Yes     Partners: Male     Birth control/protection: OCP   Other Topics Concern     Service Not Asked    Blood Transfusions Not Asked    Caffeine Concern Yes     Comment: 1/2 coffee a couple times a week    Occupational Exposure Not Asked    Hobby Hazards Not Asked    Sleep Concern Not Asked    Stress Concern Not Asked    Weight Concern Not Asked    Special Diet Not Asked    Back Care Not Asked    Exercise No    Bike Helmet Not Asked    Seat Belt Not Asked    Self-Exams Not Asked   Social History Narrative    Not on file     Social Drivers of Health     Food Insecurity: No Food Insecurity (3/26/2024)    Food Insecurity     Food Insecurity: Never true    Transportation Needs: No Transportation Needs (3/26/2024)    Transportation Needs     Lack of Transportation: No   Stress: No Stress Concern Present (3/26/2024)    Stress     Feeling of Stress : No   Housing Stability: Low Risk  (3/26/2024)    Housing Stability     Housing Instability: No     Housing Instability Emergency: Not on file     Crib or Bassinette: Not on file         Patient feels unsafe or threatened?: no    Abuse: no physical, sexual or mental.     Family History:  Family History[5]    Health maintenance:  Mammogram (age 40 and q1-2yr): n/a  Colonoscopy (age 45 and q10yr): n/a    Review of Systems:  General:  denies fevers, chills, fatigue and malaise  Breast:  denies rashes, skin changes, pain, lumps or discharge   Respiratory:  denies SOB, dyspnea, cough or wheezing  Cardiovascular:  denies chest pain, palpitations  GI: denies abdominal pain, diarrhea, constipation  :  denies dysuria, hematuria, increased urinary frequency.   Heme:  denies history of excessive bleeding or bruising      Objective:     Vitals:    06/03/25 1510   BP: 110/68   Pulse: 72   Weight: 152 lb (68.9 kg)         Body mass index is 23.81 kg/m².    General: AAO.NAD.   CVS exam: normal peripheral perfusion  Chest: non-labored breathing, no tachypnea   Breast: symmetric, no dominant or suspicious mass, no skin or nipple changes and no axillary adenopathy  Abdominal exam: soft, nontender, nondistended  Pelvic exam:   VULVA: normal appearing vulva with no masses, tenderness or lesions  PERINEUM:  normal appearing, no lesions   URETHRAL MEATUS:  normal appearing, no lesions   VAGINA: normal appearing vagina with normal color and discharge, no lesions  CERVIX: normal appearing cervix without discharge or lesions  UTERUS: uterus is normal size, shape, consistency and nontender  ADNEXA: normal adnexa in size, nontender and no masses  PERIRECTAL:  normal appearing, no lesions   Ext: non-tender, no edema    Assessment:     Shilpa Klein  Jessica is a 34 year old  female here for a well women exam.     Problem List[6]      Plan:     Cervical cancer screening  - discussion held with the patient about ASCCP guidelines  - repeat pap smear due in   Health maintenance  - encouraged to maintain weight at healthy BMI  - discussed importance of exercise and healthy eating  - self breast exam instructions provided   - 1 year refill of OCPs sent to patient's pharmacy  - encouraged MV use and daily exercise      Problem List Items Addressed This Visit    None  Visit Diagnoses         Well woman exam with routine gynecological exam    -  Primary    Relevant Medications    Desogestrel-Ethinyl Estradiol (APRI) 0.15-30 MG-MCG Oral Tab                  All of the findings and plan were discussed with the patient.  She notes understanding and agrees with the plan of care.  All questions were answered to the best of my ability at this time.      RTC in 1 year for a well woman exam or sooner if needed     Lizzeth Omer DO   [facility] - OBGYN      Discussed with patient that there will not be further notification of normal or benign results other than receiving results on [employer]. A [email or web address] message or telephone call will be placed by the physician and/or office staff if results are abnormal.       Note to patient and family   The 21st Century Cures Act makes medical notes available to patients in the interest of transparency.  However, please be advised that this is a medical document.  It is intended as xolv-kj-nhek communication.  It is written and medical language may contain abbreviations or verbiage that are technical and unfamiliar.  It may appear blunt or direct.  Medical documents are intended to carry relevant information, facts as evident, and the clinical opinion of the practitioner.      This note could include assistance by Dragon voice recognition. Errors in content may be related to improper recognition by the system; efforts to review and  correct have been done but errors may still exist.          [1]   Current Outpatient Medications on File Prior to Visit   Medication Sig Dispense Refill    hydrOXYzine 25 MG Oral Tab Take 1-4 tablets by mouth up to 4 times daily as needed for anxiety or insomnia 30 tablet 2    fluticasone-salmeterol 100-50 MCG/ACT Inhalation Aerosol Powder, Breath Activated Inhale 1 puff into the lungs 2 (two) times daily. 180 each 1    levETIRAcetam  MG Oral Tablet 24 Hr Take 2 tablets (1,500 mg total) by mouth at bedtime.      Cholecalciferol (VITAMIN D) 1000 units Oral Tab Take 1,000 tablets by mouth daily.      [DISCONTINUED] Desogestrel-Ethinyl Estradiol (APRI) 0.15-30 MG-MCG Oral Tab Take 1 tablet by mouth daily. 84 tablet 3    methylPREDNISolone (MEDROL) 4 MG Oral Tablet Therapy Pack As directed. (Patient not taking: Reported on 6/3/2025) 21 each 0    guaiFENesin-codeine 100-10 MG/5ML Oral Solution Take 5 mL by mouth every 6 (six) hours as needed. (Patient not taking: Reported on 6/3/2025) 118 mL 0     No current facility-administered medications on file prior to visit.   [2]   Allergies  Allergen Reactions    Amitriptyline FATIGUE, HALLUCINATION, OTHER (SEE COMMENTS), NAUSEA AND VOMITING and CONFUSION     Profound sleepiness.    Causes lethargy   Other reaction(s): FATIGUE   Strange dreams and sleeping too much   Profound sleepiness.    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, aware and partner to be tested (results came back after transfer but  from previous practice)- spouse is not but 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    Extrinsic asthma, unspecified    sports induced    GERD (gastroesophageal reflux disease)    Hematuria    weird presentation of ibs    History of gross hematuria    History of recurrent UTIs    Hypercholesteremia    Interstitial cystitis    Iron deficiency    Irritable bowel    Knee pain    MRSA infection    Eye    Other and unspecified ovarian cysts    Pain in both knees, unspecified chronicity    Pelvic floor dysfunction    Pelvic inflammatory disease    PONV (postoperative nausea and vomiting)    nausea    Right ankle pain, unspecified chronicity    Right rotator cuff tendonitis    Seizure disorder (HCC)    Sees Dr. Lin- last seizure November 2015. seizures since around age 12 with events of LOC without shaking. In 2016 (age 25) she had another event of LOC with urinary incontinence without shaking. Was told EEG abnormal at this time. Started LEV and had no recurrent events of this type.    Vitamin D deficiency   [4]   Past Surgical History:  Procedure Laterality Date    Colonoscopy  02/01/2012    nl ti, nl colon, nl random colon bx.    Colonoscopy N/A 05/21/2018    Procedure: COLONOSCOPY;  Surgeon: Henry Carpio MD;  Location:  ENDOSCOPY    Other surgical history  03/30/2012    Cystoscopy - Dr. Vidal     Other surgical history  01/01/2012    Negative - ab. pain - resolved with amitriptyline    Other surgical history  05/16/2017    Cystoscopy - Dr Kruger    Other surgical history  06/07/2017    Cysto w low pressure hydro-distention, fulguration of Hunner's ulcer & inj kenalog    Tonsillectomy     [5]   Family History  Problem Relation Age of Onset    Hypertension Mother     Stroke Paternal Grandmother     Cancer Neg     Diabetes Neg     Lipids Neg     Heart Disorder Neg    [6]   Patient Active Problem List  Diagnosis    Interstitial cystitis    Maternal varicella, non-immune (HCC)    Carrier of muscular dystrophy     Ankylosing spondylitis of lumbosacral region (HCC)    Asthma (HCC)    Pelvic floor dysfunction    Pain of female symphysis pubis     (normal spontaneous vaginal delivery) ([facility])

## 2025-06-16 ENCOUNTER — OFFICE VISIT (OUTPATIENT)
Dept: FAMILY MEDICINE CLINIC | Facility: CLINIC | Age: 35
End: 2025-06-16
Payer: COMMERCIAL

## 2025-06-16 VITALS
OXYGEN SATURATION: 98 % | SYSTOLIC BLOOD PRESSURE: 112 MMHG | HEART RATE: 96 BPM | HEIGHT: 67 IN | BODY MASS INDEX: 23.23 KG/M2 | DIASTOLIC BLOOD PRESSURE: 60 MMHG | RESPIRATION RATE: 19 BRPM | WEIGHT: 148 LBS
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
## 2025-06-16 RX ORDER — DOXYCYCLINE 100 MG/1
100 CAPSULE ORAL 2 TIMES DAILY
Qty: 20 CAPSULE | Refills: 0 | Status: SHIPPED | OUTPATIENT
Start: 2025-06-16 | End: 2025-06-26

## 2025-06-16 RX ORDER — TRIAMCINOLONE ACETONIDE 1 MG/G
OINTMENT TOPICAL
Qty: 80 G | Refills: 0 | Status: SHIPPED | OUTPATIENT
Start: 2025-06-16

## 2025-06-16 NOTE — PROGRESS NOTES
[facility] Family Medicine Office Note  Chief Complaint:   Chief Complaint   Patient presents with    Bite Sting,Insect     Upper left thigh, pt noticed yesterday, red in the center and pink around it, pt states it has gotten bigger       HPI:     History of Present Illness  Shilpa Lopez is a 34 year old female who presents with a bug bite that developed into a red [place] with a pink border.    She noticed the bug bite over the weekend, initially appearing as a pink [place]. Throughout the day, it developed into a red [place] with a pink border. She recalls spending time in grassy areas and [place], which she believes may have exposed her to bugs. The bite is itchy and sore. Although she did not notice any bug attached at the time of the bite, she recalls feeling a stinging feeling on her bottom.    The bite was noticed yesterday morning, suggesting it occurred on Saturday. No fever, chills, chest pain, or palpitations. She is not currently breastfeeding.    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   Cardiovascular:  Negative for chest pain and palpitations.   Skin:  Positive for color change.        EXAM:   /60   Pulse 96   Resp 19   Ht 5' 7\" (1.702 m)   Wt 148 lb (67.1 kg)   LMP 06/04/2025 (Exact Date)   SpO2 98%   BMI 23.18 kg/m²  Estimated body mass index is 23.18 kg/m² as calculated from the following:    Height as of this encounter: 5' 7\" (1.702 m).    Weight as of this encounter: 148 lb (67.1 kg).   Vital signs reviewed.Appears stated age, well groomed.  Physical Exam  Skin:     General: Skin is warm and dry.       Findings: Erythema and rash (multiple macular erytehma lesions noted on left lower extremity and left gluteal cheek. Lesion on left thigh has bulls-eye like appearance with eyrthema indination and slight central clearing and pink skin surrounding the indination.) present.           ASSESSMENT AND PLAN:   1. Bug bite, initial encounter  - Lyme Disease(B.Burgdorferi) PCR; Future  - doxycycline 100 MG Oral Cap; Take 1 capsule (100 mg total) by mouth 2 (two) times daily for 10 days.  Dispense: 20 capsule; Refill: 0  - triamcinolone 0.1 % External Ointment; Apply thin layer to affected area 2 times daily.  Dispense: 80 g; Refill: 0    Assessment & Plan  Suspected Tick Bite  [place] lesion on upper thigh suggests possible tick bite. Early doxycycline treatment initiated to prevent progression of potential Lyme Disease. Pt unsure of type of bug that bit her. Other multiple erythema macular lesions noted on upper left extremity  - Prescribed doxycycline 100 mg twice daily for 10 days.  - Prescribed topical corticosteroid for pruritus on all lesions, apply twice daily.  - Ordered Lyme disease antibody test to complete in 4-6 weeks. Antibody test not to be completed today due to bite occurring possibly 2 days ago and potentially not enough antigen in blood stream yet.  - Advised to monitor for additional erythematous lesions and report.  - Advised to report any fever or chills.  - Documented photograph of the lesion for medical records (See above).    Follow-up  Monitor for symptoms and retest for Lyme disease antibodies.  - Schedule follow-up Lyme disease antibody test in 4-6 weeks.  - Advise follow-up with Dr. Pickard if additional symptoms occur.        Meds, Orders, & Refills for this Visit:  Requested Prescriptions     Signed Prescriptions Disp Refills    doxycycline 100 MG Oral Cap 20 capsule 0     Sig: Take 1 capsule (100 mg total) by mouth 2 (two) times daily for 10 days.    triamcinolone 0.1 % External Ointment 80 g 0      Sig: Apply thin layer to affected area 2 times daily.         Imaging & Consults:  None      Health Maintenance:  Health Maintenance Due   Topic Date Due    Annual Physical  Never done    Asthma Control Test  Never done    Pneumococcal Vaccine: Birth to 50yrs (2 of 2 - PCV) 10/03/2019         Problem List:  Problem List[7]      Patient/Caregiver Education: Patient/Caregiver Education: There are no barriers to learning. Medical education done.   Outcome: Shilpa Lopez verbalizes understanding, agrees with the plan, and had no other questions at the end of today's visit. Shilpa Lopez is informed to call with any questions, complications, allergies, or worsening or changing symptoms.  Shilpa Lopez is to call with any side effects or complications from the treatments as a result of today.     Follow-up in   Return if symptoms worsen or fail to improve.    Note to patient: The 21st Century Cures Act makes medical notes like these available to patients in the interest of transparency. However, this is a medical document intended as peer to peer communication. It is written in medical language and may contain abbreviations or verbiage that are unfamiliar. It may appear blunt or direct. Medical documents are intended to carry relevant information, facts as evident, and the clinical opinion of the practitioner.         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    Extrinsic asthma, unspecified    sports induced    GERD (gastroesophageal reflux disease)    Hematuria    weird presentation of ibs    History of gross hematuria    History of  recurrent UTIs    Hypercholesteremia    Interstitial cystitis    Iron deficiency    Irritable bowel    Knee pain    MRSA infection    Eye    Other and unspecified ovarian cysts    Pain in both knees, unspecified chronicity    Pelvic floor dysfunction    Pelvic inflammatory disease    PONV (postoperative nausea and vomiting)    nausea    Right ankle pain, unspecified chronicity    Right rotator cuff tendonitis    Seizure disorder (HCC)    Sees Dr. Lin- last seizure 2015. seizures since around age 12 with events of LOC without shaking. In 2016 (age 25) she had another event of LOC with urinary incontinence without shaking. Was told EEG abnormal at this time. Started LEV and had no recurrent events of this type.    Vitamin D deficiency   [2]   Past Surgical History:  Procedure Laterality Date    Colonoscopy  2012    nl ti, nl colon, nl random colon bx.    Colonoscopy N/A 2018    Procedure: COLONOSCOPY;  Surgeon: Henry Carpio MD;  Location:  ENDOSCOPY          Other surgical history  2012    Cystoscopy - Dr. Vidal     Other surgical history  2012    Negative - ab. 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[employer]    Vaping Use    Vaping status: Never Used   Substance and Sexual Activity    Alcohol use: Yes    Drug use: No    Sexual activity: Yes     Partners: Male     Birth control/protection: OCP   Other Topics Concern    Caffeine Concern Yes      Comment: 1/2 coffee a couple times a week    Exercise No     Social Drivers of Health     Food Insecurity: No Food Insecurity (3/26/2024)    Food Insecurity     Food Insecurity: Never true   Transportation Needs: No Transportation Needs (3/26/2024)    Transportation Needs     Lack of Transportation: No   Stress: No Stress Concern Present (3/26/2024)    Stress     Feeling of Stress : No   Housing Stability: Low Risk  (3/26/2024)    Housing Stability     Housing Instability: No   [4]   Family History  Problem Relation Age of Onset    Hypertension Mother     Stroke Paternal Grandmother     Cancer Neg     Diabetes Neg     Lipids Neg     Heart Disorder Neg    [5]   Allergies  Allergen Reactions    Amitriptyline FATIGUE, HALLUCINATION, OTHER (SEE COMMENTS), NAUSEA AND VOMITING and CONFUSION     Profound sleepiness.    Causes lethargy   Other reaction(s): FATIGUE   Strange dreams and sleeping too much   Profound sleepiness.    Severe fatique    Etanercept FATIGUE, OTHER (SEE COMMENTS) and NAUSEA AND VOMITING     Paralysis of leg   Other reaction(s): Weakness   Lower extremity weakness    Lower extremity weakness    Leg paralysis    Paralysis of leg   Other reaction(s): Weakness   Lower extremity weakness   Lower extremity weakness   [6]   Current Outpatient Medications   Medication Sig Dispense Refill    doxycycline 100 MG Oral Cap Take 1 capsule (100 mg total) by mouth 2 (two) times daily for 10 days. 20 capsule 0    triamcinolone 0.1 % External Ointment Apply thin layer to affected area 2 times daily. 80 g 0    Desogestrel-Ethinyl Estradiol (APRI) 0.15-30 MG-MCG Oral Tab Take 1 tablet by mouth daily. 84 tablet 3    hydrOXYzine 25 MG Oral Tab Take 1-4 tablets by mouth up to 4 times daily as needed for anxiety or insomnia 30 tablet 2    fluticasone-salmeterol 100-50 MCG/ACT Inhalation Aerosol Powder, Breath Activated Inhale 1 puff into the lungs 2 (two) times daily. 180 each 1    levETIRAcetam  MG Oral Tablet 24 Hr  Take 2 tablets (1,500 mg total) by mouth at bedtime.      Cholecalciferol (VITAMIN D) 1000 units Oral Tab Take 1,000 tablets by mouth daily.     [7]   Patient Active Problem List  Diagnosis    Interstitial cystitis    Maternal varicella, non-immune ([facility])    Carrier of muscular dystrophy    Ankylosing spondylitis of lumbosacral region ([facility])    Asthma ([facility])    Pelvic floor dysfunction    Pain of female symphysis pubis     (normal spontaneous vaginal delivery) ([facility])

## 2025-06-16 NOTE — PROGRESS NOTES
The following individual(s) verbally consented to be recorded using ambient AI listening technology and understand that they can each withdraw their consent to this listening technology at any point by asking the clinician to turn off or pause the recording:    Patient name: Shilpa Lopez  Additional names:

## 2025-06-18 ENCOUNTER — PATIENT MESSAGE (OUTPATIENT)
Dept: NEUROLOGY | Facility: CLINIC | Age: 35
End: 2025-06-18

## 2025-06-18 DIAGNOSIS — R56.9 CONVULSIONS, UNSPECIFIED CONVULSION TYPE (HCC): Primary | ICD-10-CM

## 2025-06-19 NOTE — TELEPHONE ENCOUNTER
Medication: Levetiracetam ER 750mg     Date of last refill: 6/26/24 (#90/3)  [place]      Last office visit: 3/5/24 / 6/5/24 telemed  Due back to clinic per last office note:  6m  Date next office visit scheduled:    No future appointments.        Last OV note recommendation:      Convulsions, unspecified convulsion type (HCC)  R56.9          2. Encounter for long-term (current) use of high-risk medication  Z79.899         3. Encounter for long-term (current) drug use  Z79.899 Levetiracetam, Serum          Generalized epilepsy, she is post partum now, has been well controlled for more than a year.  Continue LEV, check level.     We discussed medication side effects and activity precautions.  We discussed symptoms that would warrant urgent/emergent evaluation. Patient verbalized understanding and agreement.     This visit was conducted as a two-way, real-time, interactive synchronous Video encounter.   The patient consents to both scheduling and proceeding with this virtual encounter, and understands this visit may be billed to their insurance carrier.     This has been done in good adam to provide continuity of care in the best interest of the provider-patient relationship.  There are limitations of this visit, especially in physical examination, though every conscious effort was taken to allow for as appropriate care, time, and medical decision making as reasonably possible.       Return in about 6 months (around 12/5/2024).

## 2025-06-23 RX ORDER — LEVETIRACETAM 750 MG/1
1500 TABLET, FILM COATED, EXTENDED RELEASE ORAL NIGHTLY
Qty: 60 TABLET | Refills: 0 | Status: SHIPPED | OUTPATIENT
Start: 2025-06-23

## 2025-06-24 NOTE — TELEPHONE ENCOUNTER
Patient is scheduled to come in tomorrow 6/25/2025 for medication refills.          Medication: LEVETIRACETAM  MG Oral Tablet 24 Hr + LEVETIRACETAM  MG Oral Tablet 24 Hr      Date of last refill: 6/26/2024 (#90/3) + 6/23/2025  [place]     Last office visit: 3/5/2024  Due back to clinic per last office note:    Date next office visit scheduled:    Future Appointments   Date Time Provider Department Center   6/25/2025  9:20 AM Thaddeus Monge MD [facility]           Last OV note recommendation:    ASSESSMENT & PLAN:         ICD-10-CM     1. Convulsions, unspecified convulsion type (HCC)  R56.9         2. Encounter for long-term (current) use of high-risk medication  Z79.899 Levetiracetam, Serum          Presumed generalized epilepsy in pregnancy, which is a high risk state.  Epilepsy has been well controlled for about one year.     Continue on folate 4 mg and PNV.    Pre-pregnancy, she was at a level of 6.4 on 1000 mg daily, was increased to 1250 mg daily and then became pregnant, level was 12.  Continue LEV 9511-9416 mg XR daily, requiring intense monitoring, check level today, and may adjust depending on level.  So post delivery we should aim to return to 1250 mg daily (meaning, 500 + 750 XR at night, none in AM).  I have included ob/gyn guidance at the end of my note.     We discussed medication side effects and activity precautions.  We discussed precautions surrounding pregnancy and delivery, including that she may breastfeed if she wants, she should take precautions with changing and bathing the baby, and try to minimize sleep deprivation as much as realistically possible.  We discussed symptoms that would warrant urgent/emergent evaluation. Patient verbalized understanding and agreement.     Return in about 3 months (around 6/5/2024).

## 2025-06-25 ENCOUNTER — OFFICE VISIT (OUTPATIENT)
Facility: CLINIC | Age: 35
End: 2025-06-25
Payer: COMMERCIAL

## 2025-06-25 VITALS — DIASTOLIC BLOOD PRESSURE: 68 MMHG | SYSTOLIC BLOOD PRESSURE: 104 MMHG | HEART RATE: 84 BPM | RESPIRATION RATE: 16 BRPM

## 2025-06-25 DIAGNOSIS — G40.309 GENERALIZED EPILEPSY (HCC): Primary | ICD-10-CM

## 2025-06-25 PROCEDURE — 3078F DIAST BP <80 MM HG: CPT | Performed by: OTHER

## 2025-06-25 PROCEDURE — 99214 OFFICE O/P EST MOD 30 MIN: CPT | Performed by: OTHER

## 2025-06-25 PROCEDURE — 3074F SYST BP LT 130 MM HG: CPT | Performed by: OTHER

## 2025-06-25 RX ORDER — LEVETIRACETAM 500 MG/1
500 TABLET, FILM COATED, EXTENDED RELEASE ORAL NIGHTLY
Qty: 90 TABLET | Refills: 3 | OUTPATIENT
Start: 2025-06-25

## 2025-06-25 RX ORDER — LEVETIRACETAM 750 MG/1
1500 TABLET, FILM COATED, EXTENDED RELEASE ORAL NIGHTLY
Qty: 180 TABLET | Refills: 3 | Status: SHIPPED | OUTPATIENT
Start: 2025-06-25 | End: 2026-06-20

## 2025-06-25 RX ORDER — LEVETIRACETAM 750 MG/1
750 TABLET, FILM COATED, EXTENDED RELEASE ORAL NIGHTLY
Qty: 90 TABLET | Refills: 0 | OUTPATIENT
Start: 2025-06-25

## 2025-06-25 NOTE — PATIENT INSTRUCTIONS
After your visit at the [facility] office today,  please direct any follow up questions or medication needs to the staff in our  [place] office so that your concerns may be promptly addressed.  We are available through [employer] or at the numbers below:    The phone number is:   [phone] option #1    The fax number is:  [phone]    Your pharmacy should also send any requests electronically to the [place] office.    Refill policies:    Allow 2-3 business days for refills; controlled substances may take longer.  Contact your pharmacy at least 5 days prior to running out of medication and have them send an electronic request or submit request through the “request refill” option in your [employer] account.  Refills are not addressed on weekends; covering physicians do not authorize routine medications on weekends.  No narcotics or controlled substances are refilled after noon on Fridays or by on call physicians.  By law, narcotics must be electronically prescribed.  A 30 day supply with no refills is the maximum allowed.  If your prescription is due for a refill, you may be due for a follow up appointment.  To best provide you care, patients receiving routine medications need to be seen at least once a year.  Patients receiving narcotic/controlled substance medications need to be seen at least once every 3 months.  In the event that your preferred pharmacy does not have the requested medication in stock (e.g. Backordered), it is your responsibility to find another pharmacy that has the requested medication available.  We will gladly send a new prescription to that pharmacy at your request.    Scheduling Tests:    If your physician has ordered radiology tests such as MRI or CT scans, please contact Central Scheduling at [phone] right away to schedule the test.  Once scheduled, the [facility] Centralized Referral Team will work with your insurance carrier to obtain pre-certification or prior authorization.   Depending on your insurance carrier, approval may take 3-10 days.  It is highly recommended patients assure they have received an authorization before having a test performed.  If test is done without insurance authorization, patient may be responsible for the entire amount billed.      Precertification and Prior Authorizations:  If your physician has recommended that you have a procedure or additional testing performed the [facility] Centralized Referral Team will contact your insurance carrier to obtain pre-certification or prior authorization.    You are strongly encouraged to contact your insurance carrier to verify that your procedure/test has been approved and is a COVERED benefit.  Although the [facility] hospital Centralized Referral Team does its due diligence, the insurance carrier gives the disclaimer that \"Although the procedure is authorized, this does not guarantee payment.\"    Ultimately the patient is responsible for payment.   Thank you for your understanding in this matter.  Paperwork Completion:  If you require FMLA or disability paperwork for your recovery, please make sure to either drop it off or have it faxed to our office at [phone]. Be sure the form has your name and date of birth on it.  The form will be faxed to our Forms Department and they will complete it for you.  There is a 25$ fee for all forms that need to be filled out.  Please be aware there is a 10-14 day turnaround time.  You will need to sign a release of information (QUINN) form if your paperwork does not come with one.  You may call the Forms Department with any questions at [phone].  Their fax number is [phone].

## 2025-06-25 NOTE — PROGRESS NOTES
Neurology VIDEO History & Physical     ASSESSMENT & PLAN:      ICD-10-CM    1. Generalized epilepsy (HCC)  G40.309         Generalized epilepsy, well controlled.  Continue LEV XR 1500 mg HS.  She is not planning on more children, though I advised to take FA or PNV daily anyway as she is of childbearing potential.    She had recent tick bite and annular rash - is on doxycycline for presumed acute Lyme.  I advised this is unlikely to have any bearing on her seizures or seizure medication.    This visit was conducted as a two-way, real-time, interactive synchronous Video encounter.   The patient consents to both scheduling and proceeding with this virtual encounter, and understands this visit may be billed to their insurance carrier.    This has been done in good adam to provide continuity of care in the best interest of the provider-patient relationship.  There are limitations of this visit, especially in physical examination, though every conscious effort was taken to allow for as appropriate care, time, and medical decision making as reasonably possible.      Return in about 1 year (around 6/25/2026).     ~~~~~~~~~~~~~~~~~~~~~~~~~~~    CHIEF COMPLAINT / REASON FOR VISIT:    Chief Complaint   Patient presents with    Seizures     No episodes since last visit.      HISTORY OBTAINED FROM:  Patient and others as above  Data review (see below)    HISTORY OF PRESENT ILLNESS:  Shilpa Lopez is a 34 year old  female with seizures since around age 12 with events of LOC without shaking.  In 2016 (age 25) she had another event of LOC with urinary incontinence without shaking.  Was told EEG abnormal at this time.  Started LEV and had no recurrent events of this type.    Feb 2023 she had a different event where she felt hot, disoriented, \"stomach dropped\" but did not lose consciousness.  This lasted a few minutes.  These symptoms do not occur during other events.      No other staring events.    She was pregnant, delivered  3/27/24.  During pregnancy, Dr. Bkaer has been gradually increasing LEV - up to 0031-0199 mg daily (was on 750 mg prior to Feb 2023 seizure, was increased from 1000 to 1250 due to low level but by July 2023 was already pregnant).    No side effects, mood is stable.    INTERIM HISTORY:  Remains on LEV ER 1500 mg HS (was increased after low level in Dec 2024, and she is feeling better on higher dose).  No seizures, no complaints.  No side effects LEV.    Driving status:  Driving    Previous medication trials:  None    Epilepsy risk factors:  Normal birth and development   No febrile or childhood seizures   No meningitis/encephalitis (had LP as an infant for fever, but was ok)  No head trauma with prolonged LOC/amnesia   No known structural brain lesions    DATA REVIEWED:  As documented in the HPI    March 2025  LEV 26.7  B12, TSH, CBC, CMP unremarkable     May 2024  PCP note    March 2024  OB note    LEV levels  Feb 5 2024 11  Dec 22 2023 10.8  Dec 4 2023 6.8  Nov 22 2023 4.4  Oct 20 2023 10  Jul 19 2023 12  Mar 6 2023 6.4    Nov 2023, Jul 2023, Mar 2023  Neuro note (Samuel / Delilah)    Feb 2023  MRI brain unremarkable     Jan 2023  EEG unremarkable     2016   MRI brain unremarkable   EEG 3 Hz gen spike and wave    PHYSICAL EXAMINATION:  /68   Pulse 84   Resp 16   LMP 06/04/2025 (Exact Date)     Gen: in NAD  MSE: nl attn/conc, nl language, nl fund of knowledge  CN: EOMI, VFF, nl facial mvmt, nl palate, nl tongue  Motor: 5/5 x4, no drift  DTR: 2+ BUE and BLE  Coord: nl FTN b/I  Gait: normal    NEUROLOGICAL FAMILY HISTORY:  No seizures    PAST MEDICAL HISTORY:  Past Medical History:    Allergic rhinitis    Ankylosing spondylitis (HCC)    Ankylosing spondylitis of lumbosacral region (HCC)    Anxiety    Arthritis    Asthma (HCC)    Carrier of muscular dystrophy    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, aware and partner to be tested (results came back after transfer but from previous practice)- spouse is  not but 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    Extrinsic asthma, unspecified    sports induced    GERD (gastroesophageal reflux disease)    Hematuria    weird presentation of ibs    History of gross hematuria    History of recurrent UTIs    Hypercholesteremia    Interstitial cystitis    Iron deficiency    Irritable bowel    Knee pain    MRSA infection    Eye    Other and unspecified ovarian cysts    Pain in both knees, unspecified chronicity    Pelvic floor dysfunction    Pelvic inflammatory disease    PONV (postoperative nausea and vomiting)    nausea    Right ankle pain, unspecified chronicity    Right rotator cuff tendonitis    Seizure disorder (HCC)    Sees Dr. Lin- last seizure 2015. seizures since around age 12 with events of LOC without shaking. In 2016 (age 25) she had another event of LOC with urinary incontinence without shaking. Was told EEG abnormal at this time. Started LEV and had no recurrent events of this type.    Vitamin D deficiency       PAST SURGICAL HISTORY:  Past Surgical History:   Procedure Laterality Date    Colonoscopy  2012    nl ti, nl colon, nl random colon bx.    Colonoscopy N/A 2018    Procedure: COLONOSCOPY;  Surgeon: Henry Carpio MD;  Location:  ENDOSCOPY          Other surgical history  2012    Cystoscopy - Dr. Vidal     Other surgical history  2012    Negative - ab. 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    Causes lethargy   Other reaction(s): FATIGUE   Strange dreams and sleeping too much   Profound sleepiness.    Severe fatique    Etanercept FATIGUE, OTHER (SEE COMMENTS) and NAUSEA AND VOMITING     Paralysis of leg   Other reaction(s): Weakness   Lower extremity weakness    Lower extremity weakness    Leg paralysis    Paralysis of leg   Other reaction(s): Weakness   Lower extremity weakness   Lower extremity weakness       CURRENT MEDICATIONS:  Current Outpatient Medications on File Prior to Visit   Medication Sig Dispense Refill    doxycycline 100 MG Oral Cap Take 1 capsule (100 mg total) by mouth 2 (two) times daily for 10 days. 20 capsule 0    triamcinolone 0.1 % External Ointment Apply thin layer to affected area 2 times daily. 80 g 0    Desogestrel-Ethinyl Estradiol (APRI) 0.15-30 MG-MCG Oral Tab Take 1 tablet by mouth daily. 84 tablet 3    hydrOXYzine 25 MG Oral Tab Take 1-4 tablets by mouth up to 4 times daily as needed for anxiety or insomnia 30 tablet 2    fluticasone-salmeterol 100-50 MCG/ACT Inhalation Aerosol Powder, Breath Activated Inhale 1 puff into the lungs 2 (two) times daily. 180 each 1    Cholecalciferol (VITAMIN D) 1000 units Oral Tab Take 1,000 tablets by mouth daily.       No current facility-administered medications on file prior to visit.            Thaddeus Monge MD, FAES, FAAN  Board-Certified in Neurology, Epilepsy, and Clinical Neurophysiology  [facility]  [place]

## 2025-08-05 ENCOUNTER — LAB ENCOUNTER (OUTPATIENT)
Dept: LAB | Age: 35
End: 2025-08-05
Attending: STUDENT IN AN ORGANIZED HEALTH CARE EDUCATION/TRAINING PROGRAM

## 2025-08-05 ENCOUNTER — OFFICE VISIT (OUTPATIENT)
Dept: FAMILY MEDICINE CLINIC | Facility: CLINIC | Age: 35
End: 2025-08-05

## 2025-08-05 VITALS
TEMPERATURE: 97 F | RESPIRATION RATE: 16 BRPM | SYSTOLIC BLOOD PRESSURE: 118 MMHG | OXYGEN SATURATION: 98 % | HEIGHT: 67 IN | BODY MASS INDEX: 23.25 KG/M2 | WEIGHT: 148.13 LBS | DIASTOLIC BLOOD PRESSURE: 60 MMHG | HEART RATE: 66 BPM

## 2025-08-05 DIAGNOSIS — J45.30 MILD PERSISTENT ASTHMA WITHOUT COMPLICATION (HCC): ICD-10-CM
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
## 2025-08-05 DIAGNOSIS — R07.9 CHEST PAIN, UNSPECIFIED TYPE: ICD-10-CM

## 2025-08-05 DIAGNOSIS — R63.4 UNINTENTIONAL WEIGHT LOSS: ICD-10-CM

## 2025-08-05 DIAGNOSIS — R07.9 CHEST PAIN, UNSPECIFIED TYPE: Primary | ICD-10-CM

## 2025-08-05 DIAGNOSIS — T14.8XXA BRUISING: ICD-10-CM

## 2025-08-05 LAB
APTT PPP: 28.3 SECONDS (ref 23–36)
BASOPHILS # BLD AUTO: 0.05 X10(3) UL (ref 0–0.2)
BASOPHILS NFR BLD AUTO: 0.5 %
EOSINOPHIL # BLD AUTO: 0.05 X10(3) UL (ref 0–0.7)
EOSINOPHIL NFR BLD AUTO: 0.5 %
ERYTHROCYTE [DISTWIDTH] IN BLOOD BY AUTOMATED COUNT: 11.9 %
HCT VFR BLD AUTO: 36.1 % (ref 35–48)
HGB BLD-MCNC: 12.2 G/DL (ref 12–16)
IMM GRANULOCYTES # BLD AUTO: 0.03 X10(3) UL (ref 0–1)
IMM GRANULOCYTES NFR BLD: 0.3 %
INR BLD: 0.99 (ref 0.8–1.2)
LYMPHOCYTES # BLD AUTO: 2.4 X10(3) UL (ref 1–4)
LYMPHOCYTES NFR BLD AUTO: 23.6 %
MCH RBC QN AUTO: 29.3 PG (ref 26–34)
MCHC RBC AUTO-ENTMCNC: 33.8 G/DL (ref 31–37)
MCV RBC AUTO: 86.8 FL (ref 80–100)
MONOCYTES # BLD AUTO: 0.41 X10(3) UL (ref 0.1–1)
MONOCYTES NFR BLD AUTO: 4 %
NEUTROPHILS # BLD AUTO: 7.21 X10 (3) UL (ref 1.5–7.7)
NEUTROPHILS # BLD AUTO: 7.21 X10(3) UL (ref 1.5–7.7)
NEUTROPHILS NFR BLD AUTO: 71.1 %
PLATELET # BLD AUTO: 256 10(3)UL (ref 150–450)
PROTHROMBIN TIME: 12.9 SECONDS (ref 11.6–14.8)
RBC # BLD AUTO: 4.16 X10(6)UL (ref 3.8–5.3)
TSI SER-ACNC: 1.33 UIU/ML (ref 0.55–4.78)
WBC # BLD AUTO: 10.2 X10(3) UL (ref 4–11)

## 2025-08-05 PROCEDURE — 85730 THROMBOPLASTIN TIME PARTIAL: CPT

## 2025-08-05 PROCEDURE — 3078F DIAST BP <80 MM HG: CPT | Performed by: STUDENT IN AN ORGANIZED HEALTH CARE EDUCATION/TRAINING PROGRAM

## 2025-08-05 PROCEDURE — 84443 ASSAY THYROID STIM HORMONE: CPT

## 2025-08-05 PROCEDURE — 36415 COLL VENOUS BLD VENIPUNCTURE: CPT

## 2025-08-05 PROCEDURE — 3074F SYST BP LT 130 MM HG: CPT | Performed by: STUDENT IN AN ORGANIZED HEALTH CARE EDUCATION/TRAINING PROGRAM

## 2025-08-05 PROCEDURE — 85610 PROTHROMBIN TIME: CPT

## 2025-08-05 PROCEDURE — 99214 OFFICE O/P EST MOD 30 MIN: CPT | Performed by: STUDENT IN AN ORGANIZED HEALTH CARE EDUCATION/TRAINING PROGRAM

## 2025-08-05 PROCEDURE — 87476 LYME DIS DNA AMP PROBE: CPT

## 2025-08-05 PROCEDURE — 85025 COMPLETE CBC W/AUTO DIFF WBC: CPT

## 2025-08-05 PROCEDURE — 3008F BODY MASS INDEX DOCD: CPT | Performed by: STUDENT IN AN ORGANIZED HEALTH CARE EDUCATION/TRAINING PROGRAM

## 2025-08-05 RX ORDER — MULTIVIT-MIN/IRON FUM/FOLIC AC 7.5 MG-4
1 TABLET ORAL DAILY
COMMUNITY

## 2025-08-08 LAB — LYME PCR: NEGATIVE

## 2025-08-19 ENCOUNTER — PATIENT MESSAGE (OUTPATIENT)
Dept: FAMILY MEDICINE CLINIC | Facility: CLINIC | Age: 35
End: 2025-08-19

## 2025-08-19 DIAGNOSIS — T14.8XXA BRUISING: Primary | ICD-10-CM

## 2025-08-22 ENCOUNTER — HOSPITAL ENCOUNTER (OUTPATIENT)
Dept: CV DIAGNOSTICS | Age: 35
Discharge: HOME | End: 2025-08-22
Attending: STUDENT IN AN ORGANIZED HEALTH CARE EDUCATION/TRAINING PROGRAM

## 2025-08-22 DIAGNOSIS — R07.9 CHEST PAIN, UNSPECIFIED TYPE: ICD-10-CM

## 2025-08-22 PROCEDURE — 93306 TTE W/DOPPLER COMPLETE: CPT | Performed by: STUDENT IN AN ORGANIZED HEALTH CARE EDUCATION/TRAINING PROGRAM

## (undated) NOTE — IP AVS SNAPSHOT
[facility] [place] [address] [place], [address] ~ [phone]                Discharge Summary   6/7/2017    [address] [facility]           Admission Information        Provider Department    6/7/2017 Claudell Poser, MD  Nina Monique / Marianna Ruiz Take one tablet by mouth up to every six hours if needed for pain    [facility]     [    ]    [    ]    [    ]    [    ]       IBUPROFEN IB OR        Take by mouth.       [    ]    [    ]    [    ]    [    ]       levETIRAcetam 500 MG Tabs   Commonly k softner twice daily until bowel movements are regular and not hard. Can add over the counter miralax or fiber as needed. WHAT TO EXPECT:  1. Blood in the urine is normal for 1-2 weeks.  If you start passing excessive blood clots or having symptoms of b Contact information:    Terry Milian  [address] [facility],[address]  [phone]        Future Appointments        Provider Department    6/27/2017 4:30 PM Lakisha Kruger [address] [facility]      Immunization History as of 6/7/2017  Reba Revraina 7.6 (05/03/17)  3.4 (L) (05/03/17)  138 (05/03/17)  3.8 (05/03/17)  102      Radiology Exams     None         Additional Information       We are concerned for your overall well being:    - If you are a smoker or have smoked in the last 12 months, we encou

## (undated) NOTE — LETTER
[facility]  Enrico Bradyfreddie 63 [address] [facility], [address] [place] [address]    Consent for Operation    Date: __________________    Time: _______________    1.  I authorize the performance upon Niki Sarabia the following operation:    Procedure(s):  Lakisha Stephenson procedure has been videotaped, the surgeon will obtain the original videotape. The hospital will not be responsible for storage or maintenance of this tape.     6. For the purpose of advancing medical education, I consent to the admittance of observers to t STATEMENTS REQUIRING INSERTION OR COMPLETION WERE FILLED IN.     Signature of Patient:   ___________________________    When the patient is a minor or mentally incompetent to give consent:  Signature of person authorized to consent for patient: ____________ drugs/illegal medications). Failure to inform my anesthesiologist about these medicines may increase my risk of anesthetic complications. · If I am allergic to anything or have had a reaction to anesthesia before.     3. I understand how the anesthesia med I have discussed the procedure and information above with the patient (or patient’s representative) and answered their questions. The patient or their representative has agreed to have anesthesia services.     _______________________________________________

## (undated) NOTE — LETTER
[facility]  Cesar Salazar 63 [address]    Consent for Operation    Date: __________________    Time: _______________    1.  I authorize the performance upon Renata Ayala the following operation:    Procedure(s):  ESOPHAGOGASTRODUODENOS procedure has been videotaped, the surgeon will obtain the original videotape. The hospital will not be responsible for storage or maintenance of this tape.     6. For the purpose of advancing medical education, I consent to the admittance of observers to t STATEMENTS REQUIRING INSERTION OR COMPLETION WERE FILLED IN.     Signature of Patient:   ___________________________    When the patient is a minor or mentally incompetent to give consent:  Signature of person authorized to consent for patient: ____________ drugs/illegal medications). Failure to inform my anesthesiologist about these medicines may increase my risk of anesthetic complications. · If I am allergic to anything or have had a reaction to anesthesia before.     3. I understand how the anesthesia med I have discussed the procedure and information above with the patient (or patient’s representative) and answered their questions. The patient or their representative has agreed to have anesthesia services.     _______________________________________________

## (undated) NOTE — ED AVS SNAPSHOT
Lucy    MRN: [id number]    Department:  [facility] Emergency Department   Date of Visit:  5/23/2018           Disclosure     Insurance plans vary and the physician(s) referred by the ER may not be covered by your plan.  Please contact your tell this physician (or your personal doctor if your instructions are to return to your personal doctor) about any new or lasting problems. The primary care or specialist physician will see patients referred from the [facility] Emergency Department.  Xiomy Fisher

## (undated) NOTE — LETTER
5/24/2018          [place] [place] [id number]    Dear Katie Arias,       Here are the biopsy/pathology findings from your recent EGD (uppper endoscopy) and colonoscopy:     The biopsy/pathology findings from your upper endoscopy showed:

## (undated) NOTE — LETTER
ASTHMA ACTION PLAN for Shilpa Lopez     : 1990     Date: 5/10/2024  Provider:  Loretta Pickard MD  Phone for doctor or clinic: [facility], [address] [place] [id number] [phone]    ACT Score: 17      You can use the colors of a traffic light to help learn about your asthma medicines.      1. Green - Go! % of [employer] [place] Flow Use controller medicine.   Breathing is good  No cough or wheeze  Can work and play Medicine How much to take When to take it    Advair 100/50 inhaler, 1 puff twice daily        2. Yellow - Caution. 50-79% [employer] [place]  Flow.  Use reliever medicine to keep an asthma attack from getting bad.   Cough  Wheezing  Tight Chest  Wake up at night Medicine How much to take When to take it    Albuterol inhaler,  2 puffs every four hours as needed.         Additional instructions         3. Red - Stop! Danger!  <50% [place]  Flow. Take these medications until  Get help from a doctor   Medicine not helping  Breathing is hard and fast  Nose opens wide  Can't walk  Ribs show  Can't talk well Medicine How much to take When to take it    Call 911 or go to the nearest ER. Call your doctor.     Additional Instructions If your symptoms do not improve and you cannot contact your doctor, [facility] or call 911 immediately!     [x] Asthma Action Plan reviewed with patient (and caregiver if necessary) and a copy of the plan was given to the patient/caregiver.   [] Asthma Action Plan reviewed with patient (and caregiver if necessary) on the phone and mailed copy to patient or submitted via [employer].     Signatures:  Provider  Loretta Pickard MD   Patient Caretaker

## (undated) NOTE — LETTER
Dear colleague,    Thank you very much for the opportunity to see your patient.  Below, please find information from my consult for your patient's recent visit.  I have included seizure management recommendations for the time of delivery.    I appreciate the chance to take care of your patient with you.  Please feel free to call me with any questions or concerns.    ASSESSMENT & PLAN:      ICD-10-CM    1. Convulsions, unspecified convulsion type (HCC)  R56.9       2. Encounter for long-term (current) use of high-risk medication  Z79.899 Levetiracetam, Serum      3. Vitamin D deficiency  E55.9 Vitamin D        Presumed generalized epilepsy in pregnancy, which is a high risk state.  Epilepsy has been well controlled for just under a year.    Continue on folate 4 mg and PNV.  Continue LEV 0329-2643 mg XR daily, requiring intense monitoring, check level monthly (today and in one month) and may increase depending on level.  Pre-pregnancy, she was at a level of 6.4 on 1000 mg daily, was increased to 1250 mg daily and then became pregnant, level was 12.  So post delivery we should aim to return to 1250 mg daily.  I have included ob/gyn guidance at the end of my note.    We discussed medication side effects and activity precautions.  We discussed precautions surrounding pregnancy and delivery, including that she may breastfeed if she wants, she should take precautions with changing and bathing the baby, and try to minimize sleep deprivation as much as realistically possible.  We discussed symptoms that would warrant urgent/emergent evaluation. Patient verbalized understanding and agreement.    Return in about 6 weeks (around 3/5/2024).    For the ob/gyn:    Shilpa Lopez has a history of epilepsy, and given her pending delivery, I am providing a list of standard recommendation for any such patient that is undergoing a procedure that creates emotional or physical stress on their system, in order to mitigate risk.   Please bear in mind that these are not necessarily patient-specific recommendations.    1. Please allow the patient to take their regular home doses of anti-seizure drugs (ASDs) with a sip of water, without delay, even if the patient is kept NPO.  Even jovan- and post-delivery, the regular ASDs should not be missed.  Some ASDs are not available in IV form and therefore must be given orally.      2. In addition to not missing medications, maintaining adequate hydration and avoiding hypoglycemia are also important in preventing seizures.  Long durations of NPO status should be avoided.  Sleep deprivation and infection (particularly fever) can also trigger seizures, but of course sometimes these situations cannot be prevented.    3. Lorazepam (e.g., 1-2 mg IV) can be beneficial in managing seizures during and after the delivery/procedure; this should not be given routinely, but can be considered in an urgent situation at the discretion of the attending obstetrician, proceduralist, and/or anesthesiologist.    4. Please avoid the use of the following medications, as they tend to reduce the seizure threshold:   Analgesics:  Ultram, Tramadol, Demerol    Antibiotics: Fluoroquinolones, Carbapenems, Cefepime    5. Please bear in mind that if the patient experiences a seizure around the time of delivery, it is important to determine if the seizure was similar or different from the patient's usual seizure symptom and frequency (this can be done by history). Depending on the situation, causes other than the patient's known epilepsy may need to be considered (e.g., PRES, eclampsia, cerebral venous sinus thrombosis).    6. In general, the benefits of breastfeeding outweigh the risks of ASD exposure to the infant.    7. If the patient is on lamotrigine or levetiracetam, the dose should be lowered after delivery to the pre-pregnancy dose.  There are no standard guidelines for lowering the doses, but one consideration would be to  remove half of the added dose the day after delivery, and the other half one week after.  For example:     a. the patient was changed from levetiracetam 1250 mg daily (before pregnancy) to 2500 mg daily (towards the end of pregnancy)   b. the day after delivery, the dose should be reduced to 2000 mg daily   c. one week after delivery, the dose should be reduced to 1250 mg daily             Sincerely,        Thaddeus Monge MD